# Patient Record
Sex: FEMALE | Race: WHITE | NOT HISPANIC OR LATINO | Employment: UNEMPLOYED | URBAN - METROPOLITAN AREA
[De-identification: names, ages, dates, MRNs, and addresses within clinical notes are randomized per-mention and may not be internally consistent; named-entity substitution may affect disease eponyms.]

---

## 2017-01-18 DIAGNOSIS — M25.512 PAIN IN LEFT SHOULDER: ICD-10-CM

## 2017-03-27 ENCOUNTER — ALLSCRIPTS OFFICE VISIT (OUTPATIENT)
Dept: OTHER | Facility: OTHER | Age: 37
End: 2017-03-27

## 2017-05-26 DIAGNOSIS — M25.512 PAIN IN LEFT SHOULDER: ICD-10-CM

## 2017-08-09 ENCOUNTER — ALLSCRIPTS OFFICE VISIT (OUTPATIENT)
Dept: OTHER | Facility: OTHER | Age: 37
End: 2017-08-09

## 2017-11-15 ENCOUNTER — ALLSCRIPTS OFFICE VISIT (OUTPATIENT)
Dept: OTHER | Facility: OTHER | Age: 37
End: 2017-11-15

## 2017-11-15 DIAGNOSIS — J18.9 PNEUMONIA: ICD-10-CM

## 2017-11-15 DIAGNOSIS — M54.30 SCIATICA: ICD-10-CM

## 2017-11-15 DIAGNOSIS — M25.512 PAIN IN LEFT SHOULDER: ICD-10-CM

## 2017-11-16 NOTE — PROGRESS NOTES
Assessment    1  Shoulder pain, left (719 41) (M25 512)   2  Sciatica without lumbago, unspecified laterality (724 3) (M54 30)    Plan  Sciatica without lumbago, unspecified laterality, Shoulder pain, left    · Naproxen 500 MG Oral Tablet; 1 tab po bid pc prn   · *1 - [facility]  *  Status: Active  Requestedfor: [id number]  Care Summary provided  : Yes    Discussion/Summary    1  Left shoulder pain - suspect mild rotator cuff tendinitis  I recommend that we refer patient to physical therapy  Patient refuses prednisone so we will start on naproxen 500 milligrams b i d  p c  Recheck 2 weeks if not improved-earlier if worseleft sciatica - I explained to patient that I do not believe that this has anything to do with her shoulder or neck  Patient does have some slight tenderness palpation over the left SI joint and straight leg raise is mildly positive  There is no weakness or loss of function  At this point we will recommend physical therapy and naproxen as above  Ice, rest and avoidance of exacerbating positions in activities also advised  Recheck 2-3 weeks if not improved that showed [employer]  Patient to call for problems or concerns in the interim  The patient was counseled regarding instructions for management,-- risk factor reductions,-- prognosis,-- patient and family education,-- impressions,-- risks and benefits of treatment options,-- importance of compliance with treatment  Possible side effects of new medications were reviewed with the patient/guardian today  The treatment plan was reviewed with the patient/guardian  The patient/guardian understands and agrees with the treatment plan      Chief Complaint  pain on left side starting at neck down to foot x 1 week      History of Present Illness  HPI: as abovept with a long hx of L shoulder pain presents with worsening over the last few months   Pt has tried to exercise to make it better but it seems to be making it worse, (+) trapezius area tightness  Pain may julia cause her to get frontal HAs  Pt feels like her L arm and hand is weakerpt notes 1 week hx of L leg pain  No increased lower back pain  Pain radiates down the back of her leg to the outside of her L ankle/foot (S1 dermatome)  No change in bowel/bladder function  Review of Systems   Constitutional: as noted in HPI  Cardiovascular: no complaints of slow or fast heart rate, no chest pain, no palpitations, no leg claudication or lower extremity edema  Respiratory: no complaints of shortness of breath, no wheezing, no dyspnea on exertion, no orthopnea or PND  Musculoskeletal: as noted in HPI  Integumentary: no complaints of skin rash or lesion, no itching or dry skin, no skin wounds  Neurological: as noted in HPI  Active Problems  1  Acute dysfunction of left eustachian tube (381 81) (H69 82)   2  Acute upper respiratory infection (465 9) (J06 9)   3  Annual physical exam (V70 0) (Z00 00)   4  Anxiety disorder (300 00) (F41 9)   5  Carpal tunnel syndrome of right wrist (354 0) (G56 01)   6  Shoulder pain, left (719 41) (M25 512)   7  Sprain of radiocarpal joint or ligament (842 02) (S63 529A)   8  Tuberculosis screening (V74 1) (Z11 1)   9  Wrist pain, chronic, right (895 97,459 56) (M25 531,G89 29)    Past Medical History  1  History of Anxiety (300 00) (F41 9)   2  History of Laceration of liver, subsequent encounter (V58 89,864 00) (S36 113D)   3  History of Laceration of right kidney, subsequent encounter (V58 89,866 02) (R23 042V)  Active Problems And Past Medical History Reviewed: The active problems and past medical history were reviewed and updated today  Family History  Mother    1  Family history of Hypertension (V17 49)  Father    2  Family history of Healthy adult  Brother    3  Family history of Healthy adult  Paternal Grandmother    3  Family history of colon cancer (V16 0) (Z80 0)  Family History    5  Family history of Cancer   6   Family history of Hypertension (V17 49)    Social History   · Marital History - Currently    · Never A Smoker   · Never Drank Alcohol   · Occupation: Homemaker  The social history was reviewed and updated today  Surgical History    1  History of Appendectomy   2  History of Complex Suture Of Laceration Of Liver   3  History of Diagnostic Esophagogastroduodenoscopy   4  History of Lysis Of Peritoneal Adhesions Laparoscopic    Current Meds   1  Daily Multivitamin TABS; Take 1 tablet daily Recorded   2  Escitalopram Oxalate 10 MG Oral Tablet; Take 1 tablet daily  Requested for: 98FEV6503; Last Rx:75Ykf7723 Ordered   3  Fluticasone Propionate 50 MCG/ACT Nasal Suspension; USE 2 SPRAYS IN EACH NOSTRIL ONCE DAILY; Therapy: 49TOJ0868 to (Last Rx:16Dja4736)  Requested for: 96Von5424 Ordered   4  Loratadine 10 MG Oral Tablet; TAKE 1 TABLET DAILY AS NEEDED; Therapy: 57QWK4263 to (Evaluate:68Mvs1757)  Requested for: 53Rvg6045; Last Rx:92Fvw6949 Ordered    The medication list was reviewed and updated today  Allergies  1  Inapsine SOLN  2  Latex    Vitals   Recorded: [id number] 01:35PM   Temperature 98 F   Heart Rate 74   Systolic 619   Diastolic 72   Height 5 ft 1 5 in   Weight 198 lb 2 oz   BMI Calculated 36 83   BSA Calculated 1 89       Physical Exam   Constitutional  General appearance: No acute distress, well appearing and well nourished  Neck  Neck: Supple, symmetric, trachea midline, no masses  Pulmonary  Respiratory effort: No increased work of breathing or signs of respiratory distress  Auscultation of lungs: Clear to auscultation  Cardiovascular  Auscultation of heart: Normal rate and rhythm, normal S1 and S2, no murmurs  Peripheral vascular exam: Normal    Examination of extremities for edema and/or varicosities: Normal    Lymphatic  Palpation of lymph nodes in neck: No lymphadenopathy  Palpation of lymph nodes in other areas: No lymphadenopathy     Musculoskeletal  Gait and station: Normal  Digits and nails: Normal without clubbing or cyanosis  Joints, bones, and muscles: Abnormal  -- (Left shoulder with good range of motion  Mild tenderness to palpation at the left subacromial space  Mild increased discomfort with empty can test  AC joint nontender  Mild discomfort with internal rotation with the arm abducted 90 degrees  Biceps tendons are within normal limits  Left lower back with slight tenderness to palpation along in the mid SI joint  No spasm appreciated  Straight leg raise is extremely limited by hamstring stretch parentheses approximately 33 degrees) disease  Straight leg raise does increase leg discomfort slightly)  Muscle strength/tone: Normal    Skin  Skin and subcutaneous tissue: Normal without rashes or lesions  Neurologic  Reflexes: 2+ and symmetric  Sensation: No sensory loss         Signatures   Electronically signed by : ENRICO Sanabria ; Nov 15 2017  1:57PM EST                       (Author)

## 2017-11-27 ENCOUNTER — GENERIC CONVERSION - ENCOUNTER (OUTPATIENT)
Dept: OTHER | Facility: OTHER | Age: 37
End: 2017-11-27

## 2017-11-27 ENCOUNTER — APPOINTMENT (OUTPATIENT)
Dept: PHYSICAL THERAPY | Facility: CLINIC | Age: 37
End: 2017-11-27
Payer: COMMERCIAL

## 2017-11-27 DIAGNOSIS — M54.30 SCIATICA: ICD-10-CM

## 2017-11-27 DIAGNOSIS — M25.512 PAIN IN LEFT SHOULDER: ICD-10-CM

## 2017-11-27 PROCEDURE — G8991 OTHER PT/OT GOAL STATUS: HCPCS

## 2017-11-27 PROCEDURE — G8990 OTHER PT/OT CURRENT STATUS: HCPCS

## 2017-11-27 PROCEDURE — 97162 PT EVAL MOD COMPLEX 30 MIN: CPT

## 2017-11-27 PROCEDURE — 97110 THERAPEUTIC EXERCISES: CPT

## 2017-12-01 ENCOUNTER — APPOINTMENT (OUTPATIENT)
Dept: PHYSICAL THERAPY | Facility: CLINIC | Age: 37
End: 2017-12-01
Payer: COMMERCIAL

## 2017-12-01 PROCEDURE — 97110 THERAPEUTIC EXERCISES: CPT

## 2017-12-01 PROCEDURE — 97140 MANUAL THERAPY 1/> REGIONS: CPT

## 2017-12-01 PROCEDURE — 97112 NEUROMUSCULAR REEDUCATION: CPT

## 2017-12-05 ENCOUNTER — APPOINTMENT (OUTPATIENT)
Dept: PHYSICAL THERAPY | Facility: CLINIC | Age: 37
End: 2017-12-05
Payer: COMMERCIAL

## 2017-12-05 PROCEDURE — 97140 MANUAL THERAPY 1/> REGIONS: CPT

## 2017-12-05 PROCEDURE — 97112 NEUROMUSCULAR REEDUCATION: CPT

## 2017-12-05 PROCEDURE — 97110 THERAPEUTIC EXERCISES: CPT

## 2017-12-07 ENCOUNTER — APPOINTMENT (OUTPATIENT)
Dept: PHYSICAL THERAPY | Facility: CLINIC | Age: 37
End: 2017-12-07
Payer: COMMERCIAL

## 2017-12-07 PROCEDURE — 97140 MANUAL THERAPY 1/> REGIONS: CPT

## 2017-12-07 PROCEDURE — 97112 NEUROMUSCULAR REEDUCATION: CPT

## 2017-12-07 PROCEDURE — 97530 THERAPEUTIC ACTIVITIES: CPT

## 2017-12-12 ENCOUNTER — APPOINTMENT (OUTPATIENT)
Dept: PHYSICAL THERAPY | Facility: CLINIC | Age: 37
End: 2017-12-12
Payer: COMMERCIAL

## 2017-12-13 ENCOUNTER — GENERIC CONVERSION - ENCOUNTER (OUTPATIENT)
Dept: OTHER | Facility: OTHER | Age: 37
End: 2017-12-13

## 2017-12-13 ENCOUNTER — TRANSCRIBE ORDERS (OUTPATIENT)
Dept: ADMINISTRATIVE | Facility: HOSPITAL | Age: 37
End: 2017-12-13

## 2017-12-13 ENCOUNTER — HOSPITAL ENCOUNTER (OUTPATIENT)
Dept: RADIOLOGY | Facility: HOSPITAL | Age: 37
Discharge: HOME | End: 2017-12-13
Payer: COMMERCIAL

## 2017-12-13 DIAGNOSIS — J18.9 PNEUMONIA: ICD-10-CM

## 2017-12-13 PROCEDURE — 71020 HB CHEST X-RAY 2VW FRONTAL&LATL: CPT

## 2017-12-14 ENCOUNTER — GENERIC CONVERSION - ENCOUNTER (OUTPATIENT)
Dept: OTHER | Facility: OTHER | Age: 37
End: 2017-12-14

## 2017-12-15 ENCOUNTER — APPOINTMENT (OUTPATIENT)
Dept: PHYSICAL THERAPY | Facility: CLINIC | Age: 37
End: 2017-12-15
Payer: COMMERCIAL

## 2017-12-19 ENCOUNTER — APPOINTMENT (OUTPATIENT)
Dept: PHYSICAL THERAPY | Facility: CLINIC | Age: 37
End: 2017-12-19
Payer: COMMERCIAL

## 2017-12-19 PROCEDURE — 97140 MANUAL THERAPY 1/> REGIONS: CPT

## 2017-12-21 ENCOUNTER — APPOINTMENT (OUTPATIENT)
Dept: PHYSICAL THERAPY | Facility: CLINIC | Age: 37
End: 2017-12-21
Payer: COMMERCIAL

## 2017-12-21 PROCEDURE — 97140 MANUAL THERAPY 1/> REGIONS: CPT

## 2018-01-02 ENCOUNTER — APPOINTMENT (OUTPATIENT)
Dept: PHYSICAL THERAPY | Facility: CLINIC | Age: 38
End: 2018-01-02
Payer: COMMERCIAL

## 2018-01-02 PROCEDURE — 97110 THERAPEUTIC EXERCISES: CPT

## 2018-01-02 PROCEDURE — 97140 MANUAL THERAPY 1/> REGIONS: CPT

## 2018-01-05 ENCOUNTER — APPOINTMENT (OUTPATIENT)
Dept: PHYSICAL THERAPY | Facility: CLINIC | Age: 38
End: 2018-01-05
Payer: COMMERCIAL

## 2018-01-05 PROCEDURE — G8990 OTHER PT/OT CURRENT STATUS: HCPCS

## 2018-01-05 PROCEDURE — G8991 OTHER PT/OT GOAL STATUS: HCPCS

## 2018-01-05 PROCEDURE — 97140 MANUAL THERAPY 1/> REGIONS: CPT

## 2018-01-05 PROCEDURE — 97110 THERAPEUTIC EXERCISES: CPT

## 2018-01-05 PROCEDURE — 97164 PT RE-EVAL EST PLAN CARE: CPT

## 2018-01-08 ENCOUNTER — APPOINTMENT (OUTPATIENT)
Dept: PHYSICAL THERAPY | Facility: CLINIC | Age: 38
End: 2018-01-08
Payer: COMMERCIAL

## 2018-01-08 PROCEDURE — 97110 THERAPEUTIC EXERCISES: CPT

## 2018-01-08 PROCEDURE — 97140 MANUAL THERAPY 1/> REGIONS: CPT

## 2018-01-10 ENCOUNTER — APPOINTMENT (OUTPATIENT)
Dept: PHYSICAL THERAPY | Facility: CLINIC | Age: 38
End: 2018-01-10
Payer: COMMERCIAL

## 2018-01-10 PROCEDURE — 97140 MANUAL THERAPY 1/> REGIONS: CPT

## 2018-01-10 PROCEDURE — 97010 HOT OR COLD PACKS THERAPY: CPT

## 2018-01-10 PROCEDURE — 97110 THERAPEUTIC EXERCISES: CPT

## 2018-01-10 NOTE — PROCEDURES
Procedures signed  by Janina Polo DO at 3/24/2016 11:14 AM       Author:  Janina Polo DO Service:  (none) Author Type:  Physician     Filed:  3/24/2016 11:14 AM Date of Service:  3/23/2016 12:04 PM Status:  Signed     :  Janina Polo DO (Physician)            [place], [place]  [id number]  NEUROLOGY REPORT  03/23/2016    Referring Physician  Dr Bina Wu     History Of Present Illness  38year-old, right-hand dominant female with chronic pain in the right   wrist  She also has occasional numbness in the digits 3 and 4 of the   right hand  She has a history of remote fractures as a child in both   the right ulna and right distal radius  These were treated   nonoperatively  She has a history of ligament tear and had been   treated by Dr Rema Arellano in the past  She had a previous   electrodiagnostic study at [facility] and was diagnosed with   cervical radiculopathy  She presents today for electrodiagnostic study   of the right upper extremity  Past Medical/surgical History  Negative  Physical Examination  Reflex motor and sensory exam are entirely intact in the right upper   extremity  On inspection, there is no gross osseous or soft tissue   deformity  Tinel sign is positive over the distal median nerve  There   is no fasciculations or tremors  Electrodiagnostic Findings   Electroneurography: The right median sensory latency from the wrist to   the index finger was top normal at 3 4 ms  The right median motor   distal latency was top normal at 4 0 ms but was a full millisecond   longer than the distal right ulnar motor latency  With stimulation at   the palm, there was an increase in amplitude indicating partial   conduction block of the motor fibers         Studies of the right median sensory fibers and radial sensory fibers   as measured to the thumb were entirely normal  Studies of the right   ulnar nerve including motor and sensory fibers were entirely normal  Electromyography: Monopolar needle exploration failed to reveal any   abnormal spontaneous potentials in the following right-sided muscles:   Cervical paraspinals, abductor pollicis brevis, pronator teres,   biceps, triceps, and deltoid  Motor units were normal in all muscles   as was recruitment  Impression  1  There is borderline median nerve slowing at the level of the right   wrist, which supports a clinical diagnosis of a borderline carpal   tunnel syndrome  2  There is no electrical evidence in the nerves and muscles tested   today to indicate or suggest the presence of any more proximal   pathology such as cervical radiculopathy or plexopathy, nor is there   evidence for any underlying polyneuropathy  Recommendation  Careful clinical correlation is advised    [id number]  D:  03/23/2016 10:26:00  Dictated by:  Leonarda Robert DO, [address] [facility] Board of Electrodiagnostic Medicine    T:  03/23/2016 12:04:20    CC:    Chanelle Del Valle MD             Received for:Vlad Robert DO  Mar 24 2016 11:15AM [facility] Standard Time

## 2018-01-12 VITALS
BODY MASS INDEX: 36.46 KG/M2 | TEMPERATURE: 98 F | DIASTOLIC BLOOD PRESSURE: 72 MMHG | HEART RATE: 74 BPM | HEIGHT: 62 IN | WEIGHT: 198.13 LBS | SYSTOLIC BLOOD PRESSURE: 112 MMHG

## 2018-01-13 VITALS
TEMPERATURE: 99.1 F | HEART RATE: 80 BPM | DIASTOLIC BLOOD PRESSURE: 72 MMHG | HEIGHT: 62 IN | BODY MASS INDEX: 35.33 KG/M2 | SYSTOLIC BLOOD PRESSURE: 106 MMHG | WEIGHT: 192 LBS

## 2018-01-13 NOTE — PROGRESS NOTES
Assessment    1  Annual physical exam (V70 0) (Z00 00)   2  Tuberculosis screening (V74 1) (Z11 1)    Plan  Tuberculosis screening    · PPD; INJECT 0 1  ML Intradermal; To Be Done: 32DXR6209    Discussion/Summary  health maintenance visit Currently, she eats an adequate diet and has an inadequate exercise regimen  the risks and benefits of cervical cancer screening were discussed Breast cancer screening: breast cancer screening is not indicated  Colorectal cancer screening: colorectal cancer screening is not indicated  The risks and benefits of immunizations were discussed and PPD placed  Advice and education were given regarding nutrition and weight bearing exercise  #1 PE  #2 HM - paper work for work done  i reviewed HM issues with pt  PPD placed - recheck 48h  f/u as above  or in 1 year  Pt to call for problems or concerns in the interim  The patient was counseled regarding instructions for management, risk factor reductions, patient and family education, impressions, risks and benefits of treatment options  Chief Complaint  WORK PE      History of Present Illness  HM, Adult Female: The patient is being seen for a health maintenance evaluation  General Health: The patient's health since the last visit is described as good  She has regular dental visits  She denies vision problems  She denies hearing loss  Immunizations status: up to date  Lifestyle:  She consumes a diverse and healthy diet  She does not have any weight concerns  She does not use tobacco  She denies alcohol use  She denies drug use  Reproductive health:  she reports normal menses  Screening: cancer screening reviewed and current  metabolic screening reviewed and current  risk screening reviewed and current  HPI: as above  - pt here for annual and work PE  - no new complaints  - I reviewed PMHx, PSHx, FamHx and Soc Hx with pt   Full ROS done      Review of Systems    Constitutional: No fever, no chills, feels well, no tiredness, no recent weight gain or weight loss  Eyes: No complaints of eye pain, no red eyes, no eyesight problems, no discharge, no dry eyes, no itching of eyes  ENT: no complaints of earache, no loss of hearing, no nose bleeds, no nasal discharge, no sore throat, no hoarseness  Cardiovascular: No complaints of slow heart rate, no fast heart rate, no chest pain, no palpitations, no leg claudication, no lower extremity edema  Respiratory: No complaints of shortness of breath, no wheezing, no cough, no SOB on exertion, no orthopnea, no PND  Gastrointestinal: No complaints of abdominal pain, no constipation, no nausea or vomiting, no diarrhea, no bloody stools  Genitourinary: No complaints of dysuria, no incontinence, no pelvic pain, no dysmenorrhea, no vaginal discharge or bleeding  Musculoskeletal: No complaints of arthralgias, no myalgias, no joint swelling or stiffness, no limb pain or swelling  Integumentary: No complaints of skin rash or lesions, no itching, no skin wounds, no breast pain or lump  Neurological: No complaints of headache, no confusion, no convulsions, no numbness, no dizziness or fainting, no tingling, no limb weakness, no difficulty walking  Psychiatric: Not suicidal, no sleep disturbance, no anxiety or depression, no change in personality, no emotional problems  Endocrine: No complaints of proptosis, no hot flashes, no muscle weakness, no deepening of the voice, no feelings of weakness  Hematologic/Lymphatic: No complaints of swollen glands, no swollen glands in the neck, does not bleed easily, does not bruise easily  Active Problems    1  Acute upper respiratory infection (465 9) (J06 9)   2  Anxiety disorder (300 00) (F41 9)   3  Carpal tunnel syndrome of right wrist (354 0) (G56 01)   4  Shoulder pain, left (719 41) (M21 512)   5  Sprain of radiocarpal joint or ligament (842 02) (S66 809B)   6   Wrist pain, chronic, right (641 62,914 14) (M25 531,G89 29)    Past Medical History    · History of Anxiety (300 00) (F41 9)   · History of Laceration of liver, subsequent encounter (V58 89,864 00) (S36 113D)   · History of Laceration of right kidney, subsequent encounter (V58 89,866 02) (S37 031D)    Surgical History    · History of Appendectomy   · History of Complex Suture Of Laceration Of Liver   · History of Diagnostic Esophagogastroduodenoscopy   · History of Lysis Of Peritoneal Adhesions Laparoscopic    Family History  Mother    · Family history of Hypertension (V17 49)  Father    · Family history of Healthy adult  Brother    · Family history of Healthy adult  Paternal Grandmother    · Family history of colon cancer (V16 0) (Z80 0)  Family History    · Family history of Cancer   · Family history of Hypertension (V17 49)    Social History    · Marital History - Currently    · Never A Smoker   · Never Drank Alcohol   · Occupation: Homemaker    Current Meds   1  Daily Multivitamin TABS; Take 1 tablet daily Recorded   2  Escitalopram Oxalate 10 MG Oral Tablet; Take 1 tablet daily  Requested for: 07PFI6404;   Last Rx:32Vgr0260 Ordered    Allergies    1  Inapsine SOLN    2  Latex    Vitals   Recorded: 11RGY8349 03:16PM   Temperature 98 9 F   Heart Rate 72   Systolic 564   Diastolic 70   Height 5 ft 1 5 in   Weight 180 lb    BMI Calculated 33 46   BSA Calculated 1 82     Physical Exam    Constitutional   General appearance: No acute distress, well appearing and well nourished  Head and Face   Head and face: Normal     Eyes   Conjunctiva and lids: No swelling, erythema or discharge  Pupils and irises: Equal, round, reactive to light  Ophthalmoscopic examination: Normal fundi and optic discs  Ears, Nose, Mouth, and Throat   External inspection of ears and nose: Normal     Otoscopic examination: Tympanic membranes translucent with normal light reflex  Canals patent without erythema  Hearing: Normal   grossly intact     Nasal mucosa, septum, and turbinates: Normal without edema or erythema  Lips, teeth, and gums: Normal, good dentition  Oropharynx: Normal with no erythema, edema, exudate or lesions  Neck   Neck: Supple, symmetric, trachea midline, no masses  Thyroid: Normal, no thyromegaly  Pulmonary   Respiratory effort: No increased work of breathing or signs of respiratory distress  Auscultation of lungs: Clear to auscultation  Cardiovascular   Auscultation of heart: Normal rate and rhythm, normal S1 and S2, no murmurs  Carotid pulses: 2+ bilaterally  Abdominal aorta: Normal     Pedal pulses: 2+ bilaterally  Peripheral vascular exam: Normal     Examination of extremities for edema and/or varicosities: Normal     Chest   Chest: Normal     Abdomen   Abdomen: Non-tender, no masses  Liver and spleen: No hepatomegaly or splenomegaly  Lymphatic   Palpation of lymph nodes in neck: No lymphadenopathy  Palpation of lymph nodes in other areas: No lymphadenopathy  Musculoskeletal   Gait and station: Normal     Digits and nails: Normal without clubbing or cyanosis  Joints, bones, and muscles: Normal     Range of motion: Normal     Muscle strength/tone: Normal     Skin   Skin and subcutaneous tissue: Normal without rashes or lesions  Neurologic   Cranial nerves: Cranial nerves II-XII intact  Cortical function: Normal mental status  Reflexes: 2+ and symmetric  Sensation: No sensory loss  Coordination: Normal finger to nose and heel to shin  Psychiatric   Judgment and insight: Normal     Orientation to person, place, and time: Normal     Recent and remote memory: Intact  Mood and affect: Normal        Results/Data  PHQ-2 Adult Depression Screening 27Mar2017 03:18PM User, Felipa     Test Name Result Flag Reference   PHQ-2 Adult Depression Score 0     Over the last two weeks, how often have you been bothered by any of the following problems?   Little interest or pleasure in doing things: Not at all - 0  Feeling down, depressed, or hopeless: Not at all - 0   PHQ-2 Adult Depression Screening Negative         Procedure    Procedure: Visual Acuity Test    Indication: routine screening  Inforrmation supplied by ALBERTO BROWN a Snellen chart  Results: 20/20 in the right eye without corrective device, 20/20 in the left eye with corrective device   The patient was cooperative, but tolerated the procedure well  There were no complications        Future Appointments    Date/Time Provider Specialty Site   03/29/2017 03:00 PM Sony Harmon, Nurse Schedule  [facility]     Signatures   Electronically signed by : Cherylene Spear, M D ; Mar 28 2017 12:45PM EST                       (Author)

## 2018-01-14 VITALS
SYSTOLIC BLOOD PRESSURE: 108 MMHG | DIASTOLIC BLOOD PRESSURE: 70 MMHG | HEART RATE: 72 BPM | TEMPERATURE: 98.9 F | HEIGHT: 62 IN | WEIGHT: 180 LBS | BODY MASS INDEX: 33.13 KG/M2

## 2018-01-16 ENCOUNTER — APPOINTMENT (OUTPATIENT)
Dept: PHYSICAL THERAPY | Facility: CLINIC | Age: 38
End: 2018-01-16
Payer: COMMERCIAL

## 2018-01-16 PROCEDURE — 97140 MANUAL THERAPY 1/> REGIONS: CPT

## 2018-01-16 PROCEDURE — 97110 THERAPEUTIC EXERCISES: CPT

## 2018-01-19 ENCOUNTER — APPOINTMENT (OUTPATIENT)
Dept: PHYSICAL THERAPY | Facility: CLINIC | Age: 38
End: 2018-01-19
Payer: COMMERCIAL

## 2018-01-19 PROCEDURE — 97110 THERAPEUTIC EXERCISES: CPT

## 2018-01-19 PROCEDURE — 97140 MANUAL THERAPY 1/> REGIONS: CPT

## 2018-01-23 ENCOUNTER — APPOINTMENT (OUTPATIENT)
Dept: PHYSICAL THERAPY | Facility: CLINIC | Age: 38
End: 2018-01-23
Payer: COMMERCIAL

## 2018-01-23 PROCEDURE — 97110 THERAPEUTIC EXERCISES: CPT

## 2018-01-23 PROCEDURE — 97140 MANUAL THERAPY 1/> REGIONS: CPT

## 2018-01-23 NOTE — RESULT NOTES
Verified Results  * XR CHEST PA & LATERAL [id number] 05:54PM Dionne Sousa Order Number: [id number]     Test Name Result Flag Reference   XR CHEST PA & LATERAL (Report)     This is a summary report  The complete report is available in the patient's medical record  If you cannot access the medical record, please contact the sending organization for a detailed fax or copy  CHEST      INDICATION: J18 9: Pneumonia, unspecified organism  History taken directly from the electronic ordering system  Productive cough and shortness of breath  Chest pain  COMPARISON: None     VIEWS: Frontal and lateral projections     IMAGES: 2     FINDINGS:        Cardiomediastinal silhouette appears unremarkable  There is a small focus of airspace disease in either the left upper lobe or superior segment of the left lower lobe  Right lung is clear  There is no pneumothorax or pleural effusion  Visualized osseous structures appear within normal limits for the patient's age  IMPRESSION:     Small focal airspace opacity in the left lung most concerning for pneumonia  Follow-up radiographs are recommended post therapy to assure resolution (6-10 weeks time)      The presence of a significant finding was documented as such in Epic for liason and referring practitioner notification         Workstation performed: [id number]     Signed by:   Steve Lima MD   12/14/17

## 2018-01-24 VITALS
SYSTOLIC BLOOD PRESSURE: 108 MMHG | WEIGHT: 195.38 LBS | HEIGHT: 62 IN | DIASTOLIC BLOOD PRESSURE: 78 MMHG | TEMPERATURE: 99.4 F | HEART RATE: 70 BPM | BODY MASS INDEX: 35.95 KG/M2

## 2018-01-25 ENCOUNTER — OFFICE VISIT (OUTPATIENT)
Dept: FAMILY MEDICINE CLINIC | Facility: CLINIC | Age: 38
End: 2018-01-25
Payer: COMMERCIAL

## 2018-01-25 ENCOUNTER — APPOINTMENT (OUTPATIENT)
Dept: PHYSICAL THERAPY | Facility: CLINIC | Age: 38
End: 2018-01-25
Payer: COMMERCIAL

## 2018-01-25 ENCOUNTER — TELEPHONE (OUTPATIENT)
Dept: FAMILY MEDICINE CLINIC | Facility: CLINIC | Age: 38
End: 2018-01-25

## 2018-01-25 VITALS
DIASTOLIC BLOOD PRESSURE: 84 MMHG | WEIGHT: 199.4 LBS | HEIGHT: 63 IN | BODY MASS INDEX: 35.33 KG/M2 | TEMPERATURE: 99.5 F | HEART RATE: 76 BPM | SYSTOLIC BLOOD PRESSURE: 118 MMHG

## 2018-01-25 DIAGNOSIS — J18.9 PNEUMONIA: ICD-10-CM

## 2018-01-25 DIAGNOSIS — J01.10 ACUTE NON-RECURRENT FRONTAL SINUSITIS: Primary | ICD-10-CM

## 2018-01-25 PROCEDURE — 99213 OFFICE O/P EST LOW 20 MIN: CPT | Performed by: FAMILY MEDICINE

## 2018-01-25 RX ORDER — AMOXICILLIN AND CLAVULANATE POTASSIUM 875; 125 MG/1; MG/1
1 TABLET, FILM COATED ORAL EVERY 12 HOURS SCHEDULED
Qty: 20 TABLET | Refills: 0 | Status: SHIPPED | OUTPATIENT
Start: 2018-01-25 | End: 2018-02-04

## 2018-01-25 RX ORDER — ESCITALOPRAM OXALATE 10 MG/1
1 TABLET ORAL DAILY
COMMUNITY
End: 2018-12-17 | Stop reason: SDUPTHER

## 2018-01-25 RX ORDER — ERGOCALCIFEROL (VITAMIN D2) 10 MCG
1 TABLET ORAL DAILY
COMMUNITY

## 2018-01-25 NOTE — PROGRESS NOTES
Assessment/Plan:      Pt to f/u 4-5 days if not improving, earlier if worsening  Pt to call for problems or concerns in the interim    Subjective:      Patient ID: Rangel Singh is a 44 y o  female  43 yo female present with mild cough and congestion x 3-4 days no with 24h hx of nausea, ear pain, sinus pressure and fever  Cough is occasionally productive  ASHBY was in the fronta/temple area      Earache    Associated symptoms include coughing, headaches, rhinorrhea and a sore throat  Pertinent negatives include no hearing loss  Sore Throat    Associated symptoms include coughing, ear pain and headaches  Cough   Associated symptoms include chills, ear pain, a fever, headaches, postnasal drip, rhinorrhea and a sore throat  Pertinent negatives include no chest pain or eye redness  Headache    Associated symptoms include coughing, ear pain, a fever, nausea, rhinorrhea and a sore throat  Pertinent negatives include no eye pain, eye redness or hearing loss  Nausea   Associated symptoms include chills, coughing, fatigue, a fever, headaches, nausea and a sore throat  Pertinent negatives include no chest pain  The following portions of the patient's history were reviewed and updated as appropriate: allergies, current medications, past medical history, past social history and problem list     Review of Systems   Constitutional: Positive for chills, fatigue and fever  Negative for activity change and appetite change  HENT: Positive for ear pain, postnasal drip, rhinorrhea, sinus pain and sore throat  Negative for facial swelling and hearing loss  Eyes: Negative for pain, discharge, redness and itching  Respiratory: Positive for cough  Cardiovascular: Negative for chest pain  Gastrointestinal: Positive for nausea  Neurological: Positive for headaches  Objective:     Physical Exam   Constitutional: She appears well-developed and well-nourished  No distress     HENT:   Head: Normocephalic and atraumatic  Right Ear: Hearing, tympanic membrane, external ear and ear canal normal    Left Ear: Hearing, tympanic membrane, external ear and ear canal normal    Nose: Mucosal edema present  No rhinorrhea  Right sinus exhibits frontal sinus tenderness  Right sinus exhibits no maxillary sinus tenderness  Left sinus exhibits frontal sinus tenderness  Left sinus exhibits no maxillary sinus tenderness  Mouth/Throat: Uvula is midline, oropharynx is clear and moist and mucous membranes are normal    Eyes: Conjunctivae and EOM are normal  Pupils are equal, round, and reactive to light  Neck: Normal range of motion  No thyromegaly present  Cardiovascular: Normal rate and intact distal pulses  Pulmonary/Chest: Effort normal and breath sounds normal    Skin: Skin is warm and dry  She is not diaphoretic

## 2018-01-30 ENCOUNTER — OFFICE VISIT (OUTPATIENT)
Dept: PHYSICAL THERAPY | Facility: CLINIC | Age: 38
End: 2018-01-30
Payer: COMMERCIAL

## 2018-01-30 DIAGNOSIS — M25.512 LEFT SHOULDER PAIN, UNSPECIFIED CHRONICITY: Primary | ICD-10-CM

## 2018-01-30 DIAGNOSIS — M54.31 SCIATICA OF RIGHT SIDE: ICD-10-CM

## 2018-01-30 PROCEDURE — 97110 THERAPEUTIC EXERCISES: CPT

## 2018-01-30 PROCEDURE — 97140 MANUAL THERAPY 1/> REGIONS: CPT

## 2018-01-30 NOTE — PROGRESS NOTES
Daily Note     Today's date: 2018  Patient name: Mariza Blair  : 1980  MRN: [id number]  Referring provider: Alexander Casarez  Dx:   Encounter Diagnoses   Name Primary?  Left shoulder pain, unspecified chronicity Yes    Sciatica of right side                   Subjective: Patient is without complaints at start of session, denies paresthesias in arm and increased soreness following last visit  States she bought a thera cane and has been using it on L UT consistently with significant relief of symptoms  Objective: See treatment diary below  Precautions: none    Daily Treatment Diary     Manual              Cervical traction x            Cervical PROM x            L UT stretch and release x                                          Exercise Diary              UBE warm up Retro 5'            UT and LS stretch 10"x5 each            Chin tucks 5"x5            latt stretch elbows on table :20x5            pec stretch on foam roller (3 positions) :20x4 each            Seat t/s extensions 10"x5            Door pec stretch 20"x5            Raysal:              rows 20# 3x10             extensions 15# 3x10             scap  depressions 13# 3x10                                                                                                                                     Modalities              MH to L UT/LS NP                                        No significant tenderness in UT noted with manuals today, mild tightness present initially that resolved following release  Patient requires intermittent verbal cues to avoid over activation of UT with tal exercises  No increased symptoms noted with TE  Patient states she feels good at end of session  Assessment: Tolerated treatment well  Plan: Progress treatment as tolerated

## 2018-02-02 ENCOUNTER — APPOINTMENT (OUTPATIENT)
Dept: PHYSICAL THERAPY | Facility: CLINIC | Age: 38
End: 2018-02-02
Payer: COMMERCIAL

## 2018-02-07 ENCOUNTER — APPOINTMENT (OUTPATIENT)
Dept: PHYSICAL THERAPY | Facility: CLINIC | Age: 38
End: 2018-02-07
Payer: COMMERCIAL

## 2018-02-21 ENCOUNTER — TELEPHONE (OUTPATIENT)
Dept: FAMILY MEDICINE CLINIC | Facility: CLINIC | Age: 38
End: 2018-02-21

## 2018-02-21 ENCOUNTER — EVALUATION (OUTPATIENT)
Dept: PHYSICAL THERAPY | Facility: CLINIC | Age: 38
End: 2018-02-21
Payer: COMMERCIAL

## 2018-02-21 DIAGNOSIS — M54.31 SCIATICA OF RIGHT SIDE: ICD-10-CM

## 2018-02-21 DIAGNOSIS — M25.512 LEFT SHOULDER PAIN, UNSPECIFIED CHRONICITY: Primary | ICD-10-CM

## 2018-02-21 PROCEDURE — 97530 THERAPEUTIC ACTIVITIES: CPT | Performed by: PHYSICAL THERAPIST

## 2018-02-21 PROCEDURE — G8991 OTHER PT/OT GOAL STATUS: HCPCS | Performed by: PHYSICAL THERAPIST

## 2018-02-21 PROCEDURE — G8992 OTHER PT/OT  D/C STATUS: HCPCS | Performed by: PHYSICAL THERAPIST

## 2018-02-21 PROCEDURE — 97110 THERAPEUTIC EXERCISES: CPT | Performed by: PHYSICAL THERAPIST

## 2018-02-21 NOTE — PROGRESS NOTES
PT Re-Evaluation     Today's date: 2018  Patient name: Vasile Arrington  : 1980  MRN: [id number]  Referring provider: Simone Guzman  Dx:   Encounter Diagnosis     ICD-10-CM    1  Left shoulder pain, unspecified chronicity M25 512    2  Sciatica of right side M54 31                   Assessment  Impairments: abnormal muscle firing, abnormal muscle tone and activity intolerance    Assessment details: Patient has made minimal progression since beginning physical therapy  Her pain has been inconsistent and she continues to have increased pain with activity  Patient demonstrates significant UE tightness L >R side , forward head and rounded shoulders, difficulty with scapulo thoracic dissociation likely leading to increased shoulder pain  Patient has plateaued with physical therapy treatment and would benefit from further assessment by physician at this time  Understanding of Dx/Px/POC: fair   Prognosis: fair    Goals  Follow up with physician  Plan  Planned therapy interventions: home exercise program  Plan details: Patient on hold at this time and was requested to follow up with her physician regarding alternative treatments for pain  Subjective Evaluation    History of Present Illness  Mechanism of injury: Patient notes her pain had continued to progress and improve  She has just been away on a long cruise and she has been having an increase in low back pain and neck pain  She has given orthotics a try for her back and it has been feeling a bit better  She finds significant relief with her theracane and continues to use it at home  She reports she still has constant pain in her shoulder and she is now getting headaches again and pain  She is able to move her arm more than she was able to before  Quality of life: good    Pain  Current pain ratin  At worst pain ratin  Quality: dull ache  Exacerbated by: overuse    Progression: no change    Treatments  Previous treatment: physical therapy  Patient Goals  Patient goals for therapy: decreased pain, increased strength, independence with ADLs/IADLs and return to sport/leisure activities  Patient goal: increase functional level and return to gym and recreational activiites pain free         Objective     Active Range of Motion   Cervical/Thoracic Spine   Normal active range of motion  Left Shoulder   Flexion: 157 degrees     Right Shoulder   Normal active range of motion    Lumbar   Flexion: 94 degrees   Extension: 27 degrees   Left lateral flexion: 32 degrees   Right lateral flexion: 37 degrees     Additional Active Range of Motion Details  Patient reports when she performs lateral flexion to R she gets discomfort into R shoulder blade     Patient notes tightness at end range           Precautions: None     Daily Treatment Diary     Manual  1/30                     Cervical traction x                     Cervical PROM x                     L UT stretch and release x                                                                           Exercise Diary  1/30 2/21                   UBE warm up Retro 5'  NP                   UT and LS stretch 10"x5 each  X                   Chin tucks 5"x5  X                   latt stretch elbows on table :20x5  NP                   pec stretch on foam roller (3 positions) :20x4 each  X                   Seat t/s extensions 10"x5  X                   Door pec stretch 20"x5  NP                   Everson:    NP                    rows 20# 3x10  NP                    extensions 15# 3x10 NP                    scap  depressions 13# 3x10  NP                    sustained neck flexion    15sx4                                                                                                                                                                                                                         Modalities  1/30                     MH to L UT/LS NP

## 2018-05-29 ENCOUNTER — TELEPHONE (OUTPATIENT)
Dept: FAMILY MEDICINE CLINIC | Facility: CLINIC | Age: 38
End: 2018-05-29

## 2018-05-29 NOTE — TELEPHONE ENCOUNTER
CALLED PT [facility] FOR PT TO RETURN CALL REGARDING CHEST XR DONE 12-13-17 FOR A F/U CXR TO MAKE SURE LUNGS FULLY HEALED

## 2018-06-11 DIAGNOSIS — J18.9 PNEUMONIA OF LEFT UPPER LOBE DUE TO INFECTIOUS ORGANISM: Primary | ICD-10-CM

## 2018-08-28 NOTE — TELEPHONE ENCOUNTER
If no cahnge - rec; ortho eval
Pt called and aware of result/information  - [facility] # given to pt
SHE HAD PT TODAY FOR HER SHOULDER  PT SAID SHES NOT SURE HOW MUCH MORE PT WILL HELP HER   SHE IS STILL HAVING PAIN     SHE WANTS TO KNOW WHAT THE NEXT STEP IS   PLEASE ADVISE
No

## 2018-08-30 ENCOUNTER — OFFICE VISIT (OUTPATIENT)
Dept: FAMILY MEDICINE CLINIC | Facility: CLINIC | Age: 38
End: 2018-08-30
Payer: COMMERCIAL

## 2018-08-30 VITALS
HEIGHT: 63 IN | SYSTOLIC BLOOD PRESSURE: 122 MMHG | DIASTOLIC BLOOD PRESSURE: 74 MMHG | TEMPERATURE: 98.8 F | WEIGHT: 202 LBS | HEART RATE: 84 BPM | BODY MASS INDEX: 35.79 KG/M2

## 2018-08-30 DIAGNOSIS — R10.84 DIFFUSE ABDOMINAL PAIN: Primary | ICD-10-CM

## 2018-08-30 DIAGNOSIS — R19.7 DIARRHEA, UNSPECIFIED TYPE: ICD-10-CM

## 2018-08-30 PROCEDURE — 3008F BODY MASS INDEX DOCD: CPT | Performed by: FAMILY MEDICINE

## 2018-08-30 PROCEDURE — 99214 OFFICE O/P EST MOD 30 MIN: CPT | Performed by: FAMILY MEDICINE

## 2018-08-30 NOTE — PROGRESS NOTES
Assessment/Plan:      Diagnoses and all orders for this visit:    Diffuse abdominal pain  -     Comprehensive metabolic panel; Future  -     CBC and differential; Future  -     Lipase; Future  -     Celiac Disease Antibody Profile; Future  -     Sedimentation rate, automated; Future  -     Ambulatory referral to General Surgery; Future    Diarrhea, unspecified type  -     Comprehensive metabolic panel; Future  -     CBC and differential; Future  -     Lipase; Future  -     Celiac Disease Antibody Profile; Future  -     Sedimentation rate, automated; Future     Discussion:   Patient with history of multiple abdominal surgeries and significant small-bowel obstructions secondary to adhesions  She is concerned that the adhesions are coming back  Exam showed mild diffuse tenderness without signs of acute abdomen  Patient denies distention or vomiting at present  I will send patient for labs and refer her to General surgery for further evaluation  Reviewed signs and symptoms of obstruction with patient-patient understands she should go to the emergency room if the symptoms should occur  Recheck after surgical evaluation  Patient call for problems or concerns in the interim      Subjective:     Patient ID: Yesenia Zaidi is a 43 y o  female  Pt with hx of SBO x 2 secondary to adhesions, s/p surgery x 2 (last in 2009) presents with return of abd pain  Pt had some abd cramping and diarrhea 18m ago that improved with change in diet  approx 8 weeks ago, pain returned - cramping in upper and lower stomach, intermittent diarrhea and increased GERD  Tried changing diet, walking/moving and OTC omeprazole without improvement  Presently gets discomfort "all the time"  Better with a hot bath, and occasionally eating (toast, crackers)  Nothing seems to make it worse  Pain is typically 3-5 at present  No N/V  No weight loss      GI Problem   The primary symptoms include abdominal pain and diarrhea   Primary symptoms do not include nausea or vomiting  The illness does not include constipation  Review of Systems   Constitutional: Negative  HENT: Negative  Eyes: Negative  Respiratory: Negative  Cardiovascular: Negative  Gastrointestinal: Positive for abdominal distention, abdominal pain and diarrhea  Negative for blood in stool, constipation, nausea and vomiting  Genitourinary: Negative  Musculoskeletal: Negative  Skin: Negative  Objective:     Physical Exam   Constitutional: She appears well-developed and well-nourished  HENT:   Head: Normocephalic and atraumatic  Right Ear: External ear normal    Left Ear: External ear normal    Nose: Nose normal    Mouth/Throat: Oropharynx is clear and moist  No oropharyngeal exudate  Eyes: Conjunctivae and EOM are normal  Pupils are equal, round, and reactive to light  Neck: Normal range of motion  Neck supple  No JVD present  No thyromegaly present  Cardiovascular: Normal rate, regular rhythm and intact distal pulses  No murmur heard  Pulmonary/Chest: Effort normal and breath sounds normal    Abdominal: Soft  Bowel sounds are normal  She exhibits no distension and no mass  There is tenderness (mild, diffuse without G/R  )  Musculoskeletal: Normal range of motion  Lymphadenopathy:     She has no cervical adenopathy  Skin: Skin is warm  She is not diaphoretic  Psychiatric: She has a normal mood and affect  Vitals reviewed

## 2018-09-07 ENCOUNTER — APPOINTMENT (OUTPATIENT)
Dept: LAB | Facility: CLINIC | Age: 38
End: 2018-09-07
Payer: COMMERCIAL

## 2018-09-07 DIAGNOSIS — R10.84 DIFFUSE ABDOMINAL PAIN: ICD-10-CM

## 2018-09-07 DIAGNOSIS — R19.7 DIARRHEA, UNSPECIFIED TYPE: ICD-10-CM

## 2018-09-07 LAB
ALBUMIN SERPL BCP-MCNC: 3.4 G/DL (ref 3.5–5)
ALP SERPL-CCNC: 55 U/L (ref 46–116)
ALT SERPL W P-5'-P-CCNC: 28 U/L (ref 12–78)
ANION GAP SERPL CALCULATED.3IONS-SCNC: 7 MMOL/L (ref 4–13)
AST SERPL W P-5'-P-CCNC: 12 U/L (ref 5–45)
BASOPHILS # BLD AUTO: 0.06 THOUSANDS/ΜL (ref 0–0.1)
BASOPHILS NFR BLD AUTO: 1 % (ref 0–1)
BILIRUB SERPL-MCNC: 0.5 MG/DL (ref 0.2–1)
BUN SERPL-MCNC: 9 MG/DL (ref 5–25)
CALCIUM SERPL-MCNC: 8.9 MG/DL (ref 8.3–10.1)
CHLORIDE SERPL-SCNC: 106 MMOL/L (ref 100–108)
CO2 SERPL-SCNC: 29 MMOL/L (ref 21–32)
CREAT SERPL-MCNC: 0.94 MG/DL (ref 0.6–1.3)
EOSINOPHIL # BLD AUTO: 0.05 THOUSAND/ΜL (ref 0–0.61)
EOSINOPHIL NFR BLD AUTO: 1 % (ref 0–6)
ERYTHROCYTE [DISTWIDTH] IN BLOOD BY AUTOMATED COUNT: 12.9 % (ref 11.6–15.1)
ERYTHROCYTE [SEDIMENTATION RATE] IN BLOOD: 15 MM/HOUR (ref 0–20)
GFR SERPL CREATININE-BSD FRML MDRD: 77 ML/MIN/1.73SQ M
GLUCOSE SERPL-MCNC: 97 MG/DL (ref 65–140)
HCT VFR BLD AUTO: 39.4 % (ref 34.8–46.1)
HGB BLD-MCNC: 12.7 G/DL (ref 11.5–15.4)
IMM GRANULOCYTES # BLD AUTO: 0.01 THOUSAND/UL (ref 0–0.2)
IMM GRANULOCYTES NFR BLD AUTO: 0 % (ref 0–2)
LIPASE SERPL-CCNC: 139 U/L (ref 73–393)
LYMPHOCYTES # BLD AUTO: 2.16 THOUSANDS/ΜL (ref 0.6–4.47)
LYMPHOCYTES NFR BLD AUTO: 32 % (ref 14–44)
MCH RBC QN AUTO: 28 PG (ref 26.8–34.3)
MCHC RBC AUTO-ENTMCNC: 32.2 G/DL (ref 31.4–37.4)
MCV RBC AUTO: 87 FL (ref 82–98)
MONOCYTES # BLD AUTO: 0.32 THOUSAND/ΜL (ref 0.17–1.22)
MONOCYTES NFR BLD AUTO: 5 % (ref 4–12)
NEUTROPHILS # BLD AUTO: 4.21 THOUSANDS/ΜL (ref 1.85–7.62)
NEUTS SEG NFR BLD AUTO: 61 % (ref 43–75)
NRBC BLD AUTO-RTO: 0 /100 WBCS
PLATELET # BLD AUTO: 317 THOUSANDS/UL (ref 149–390)
PMV BLD AUTO: 10.1 FL (ref 8.9–12.7)
POTASSIUM SERPL-SCNC: 4.3 MMOL/L (ref 3.5–5.3)
PROT SERPL-MCNC: 7 G/DL (ref 6.4–8.2)
RBC # BLD AUTO: 4.53 MILLION/UL (ref 3.81–5.12)
SODIUM SERPL-SCNC: 142 MMOL/L (ref 136–145)
WBC # BLD AUTO: 6.81 THOUSAND/UL (ref 4.31–10.16)

## 2018-09-07 PROCEDURE — 85652 RBC SED RATE AUTOMATED: CPT

## 2018-09-07 PROCEDURE — 85025 COMPLETE CBC W/AUTO DIFF WBC: CPT

## 2018-09-07 PROCEDURE — 36415 COLL VENOUS BLD VENIPUNCTURE: CPT

## 2018-09-07 PROCEDURE — 80053 COMPREHEN METABOLIC PANEL: CPT

## 2018-09-07 PROCEDURE — 82784 ASSAY IGA/IGD/IGG/IGM EACH: CPT

## 2018-09-07 PROCEDURE — 86255 FLUORESCENT ANTIBODY SCREEN: CPT

## 2018-09-07 PROCEDURE — 83516 IMMUNOASSAY NONANTIBODY: CPT

## 2018-09-07 PROCEDURE — 83690 ASSAY OF LIPASE: CPT

## 2018-09-08 LAB
ENDOMYSIUM IGA SER QL: NEGATIVE
GLIADIN PEPTIDE IGA SER-ACNC: 4 UNITS (ref 0–19)
GLIADIN PEPTIDE IGG SER-ACNC: 2 UNITS (ref 0–19)
IGA SERPL-MCNC: 172 MG/DL (ref 87–352)
TTG IGA SER-ACNC: <2 U/ML (ref 0–3)
TTG IGG SER-ACNC: <2 U/ML (ref 0–5)

## 2018-09-11 ENCOUNTER — TRANSCRIBE ORDERS (OUTPATIENT)
Dept: ADMINISTRATIVE | Facility: HOSPITAL | Age: 38
End: 2018-09-11

## 2018-09-11 DIAGNOSIS — R10.9 STOMACH ACHE: Primary | ICD-10-CM

## 2018-09-17 ENCOUNTER — HOSPITAL ENCOUNTER (OUTPATIENT)
Dept: RADIOLOGY | Facility: MEDICAL CENTER | Age: 38
Discharge: HOME | End: 2018-09-17
Payer: COMMERCIAL

## 2018-09-17 DIAGNOSIS — R10.9 STOMACH ACHE: ICD-10-CM

## 2018-09-17 PROCEDURE — 76705 ECHO EXAM OF ABDOMEN: CPT

## 2018-10-22 ENCOUNTER — TRANSCRIBE ORDERS (OUTPATIENT)
Dept: ADMINISTRATIVE | Facility: HOSPITAL | Age: 38
End: 2018-10-22

## 2018-10-22 DIAGNOSIS — R10.9 STOMACH ACHE: Primary | ICD-10-CM

## 2018-12-17 DIAGNOSIS — F41.9 ANXIETY: Primary | ICD-10-CM

## 2018-12-17 RX ORDER — ESCITALOPRAM OXALATE 10 MG/1
TABLET ORAL
Qty: 90 TABLET | Refills: 2 | Status: SHIPPED | OUTPATIENT
Start: 2018-12-17 | End: 2019-10-04 | Stop reason: SDUPTHER

## 2019-04-19 ENCOUNTER — OFFICE VISIT (OUTPATIENT)
Dept: FAMILY MEDICINE CLINIC | Facility: CLINIC | Age: 39
End: 2019-04-19
Payer: COMMERCIAL

## 2019-04-19 VITALS
TEMPERATURE: 98.4 F | WEIGHT: 204 LBS | DIASTOLIC BLOOD PRESSURE: 78 MMHG | HEIGHT: 63 IN | BODY MASS INDEX: 36.14 KG/M2 | HEART RATE: 80 BPM | SYSTOLIC BLOOD PRESSURE: 120 MMHG

## 2019-04-19 DIAGNOSIS — H10.31 ACUTE CONJUNCTIVITIS OF RIGHT EYE, UNSPECIFIED ACUTE CONJUNCTIVITIS TYPE: Primary | ICD-10-CM

## 2019-04-19 PROCEDURE — 99213 OFFICE O/P EST LOW 20 MIN: CPT | Performed by: FAMILY MEDICINE

## 2019-04-19 PROCEDURE — 3008F BODY MASS INDEX DOCD: CPT | Performed by: FAMILY MEDICINE

## 2019-04-19 PROCEDURE — 1036F TOBACCO NON-USER: CPT | Performed by: FAMILY MEDICINE

## 2019-04-19 RX ORDER — ESCITALOPRAM OXALATE 5 MG/1
10 TABLET ORAL
COMMUNITY
End: 2019-04-19 | Stop reason: ALTCHOICE

## 2019-04-19 RX ORDER — CIPROFLOXACIN HYDROCHLORIDE 3.5 MG/ML
1 SOLUTION/ DROPS TOPICAL 4 TIMES DAILY
Qty: 5 ML | Refills: 0 | Status: SHIPPED | OUTPATIENT
Start: 2019-04-19 | End: 2019-12-09

## 2019-04-22 ENCOUNTER — TELEPHONE (OUTPATIENT)
Dept: FAMILY MEDICINE CLINIC | Facility: CLINIC | Age: 39
End: 2019-04-22

## 2019-10-04 DIAGNOSIS — F41.9 ANXIETY: ICD-10-CM

## 2019-10-04 RX ORDER — ESCITALOPRAM OXALATE 10 MG/1
TABLET ORAL
Qty: 90 TABLET | Refills: 2 | Status: SHIPPED | OUTPATIENT
Start: 2019-10-04 | End: 2020-10-25

## 2019-12-09 ENCOUNTER — OFFICE VISIT (OUTPATIENT)
Dept: FAMILY MEDICINE CLINIC | Facility: CLINIC | Age: 39
End: 2019-12-09
Payer: COMMERCIAL

## 2019-12-09 VITALS
HEIGHT: 63 IN | HEART RATE: 76 BPM | BODY MASS INDEX: 37.14 KG/M2 | SYSTOLIC BLOOD PRESSURE: 126 MMHG | WEIGHT: 209.6 LBS | DIASTOLIC BLOOD PRESSURE: 82 MMHG | TEMPERATURE: 98.2 F

## 2019-12-09 DIAGNOSIS — Z11.1 TUBERCULOSIS SCREENING: ICD-10-CM

## 2019-12-09 DIAGNOSIS — Z12.31 SCREENING MAMMOGRAM, ENCOUNTER FOR: ICD-10-CM

## 2019-12-09 DIAGNOSIS — Z00.00 ANNUAL PHYSICAL EXAM: Primary | ICD-10-CM

## 2019-12-09 PROCEDURE — 86580 TB INTRADERMAL TEST: CPT | Performed by: NURSE PRACTITIONER

## 2019-12-09 PROCEDURE — 99396 PREV VISIT EST AGE 40-64: CPT | Performed by: NURSE PRACTITIONER

## 2019-12-09 NOTE — PATIENT INSTRUCTIONS
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
## 2019-12-11 ENCOUNTER — CLINICAL SUPPORT (OUTPATIENT)
Dept: FAMILY MEDICINE CLINIC | Facility: CLINIC | Age: 39
End: 2019-12-11

## 2019-12-11 DIAGNOSIS — Z11.1 ENCOUNTER FOR PPD SKIN TEST READING: Primary | ICD-10-CM

## 2019-12-11 LAB
INDURATION: NORMAL MM
TB SKIN TEST: NEGATIVE

## 2019-12-26 ENCOUNTER — OFFICE VISIT (OUTPATIENT)
Dept: URGENT CARE | Facility: CLINIC | Age: 39
End: 2019-12-26
Payer: COMMERCIAL

## 2019-12-26 VITALS
HEIGHT: 62 IN | DIASTOLIC BLOOD PRESSURE: 65 MMHG | HEART RATE: 78 BPM | BODY MASS INDEX: 38.64 KG/M2 | TEMPERATURE: 99 F | WEIGHT: 210 LBS | RESPIRATION RATE: 18 BRPM | SYSTOLIC BLOOD PRESSURE: 136 MMHG

## 2019-12-26 DIAGNOSIS — R30.0 DYSURIA: Primary | ICD-10-CM

## 2019-12-26 LAB
SL AMB  POCT GLUCOSE, UA: ABNORMAL
SL AMB LEUKOCYTE ESTERASE,UA: ABNORMAL
SL AMB POCT BILIRUBIN,UA: ABNORMAL
SL AMB POCT BLOOD,UA: ABNORMAL
SL AMB POCT CLARITY,UA: ABNORMAL
SL AMB POCT COLOR,UA: YELLOW
SL AMB POCT KETONES,UA: ABNORMAL
SL AMB POCT NITRITE,UA: ABNORMAL
SL AMB POCT PH,UA: 5
SL AMB POCT SPECIFIC GRAVITY,UA: 1.03
SL AMB POCT URINE PROTEIN: ABNORMAL
SL AMB POCT UROBILINOGEN: 0.2

## 2019-12-26 PROCEDURE — 87086 URINE CULTURE/COLONY COUNT: CPT | Performed by: NURSE PRACTITIONER

## 2019-12-26 PROCEDURE — 81002 URINALYSIS NONAUTO W/O SCOPE: CPT | Performed by: NURSE PRACTITIONER

## 2019-12-26 PROCEDURE — 99213 OFFICE O/P EST LOW 20 MIN: CPT | Performed by: NURSE PRACTITIONER

## 2019-12-26 NOTE — PATIENT INSTRUCTIONS
We will send your urine for culture and call if it is positive   Increase fluids  Tylenol/Motrin as needed for pain   Follow up with your PCP and GYN for routine exam    If pain worsens proceed to the closest emergency department  Abdominal Pain   WHAT YOU NEED TO KNOW:   Abdominal pain can be dull, achy, or sharp  You may have pain in one area of your abdomen, or in your entire abdomen  Your pain may be caused by a condition such as constipation, food sensitivity or poisoning, infection, or a blockage  Abdominal pain can also be from a hernia, appendicitis, or an ulcer  Liver, gallbladder, or kidney conditions can also cause abdominal pain  The cause of your abdominal pain may be unknown  DISCHARGE INSTRUCTIONS:   Return to the emergency department if:   · You have new chest pain or shortness of breath  · You have pulsing pain in your upper abdomen or lower back that suddenly becomes constant  · Your pain is in the right lower abdominal area and worsens with movement  · You have a fever over 100 4°F (38°C) or shaking chills  · You are vomiting and cannot keep food or liquids down  · Your pain does not improve or gets worse over the next 8 to 12 hours  · You see blood in your vomit or bowel movements, or they look black and tarry  · Your skin or the whites of your eyes turn yellow  · You are a woman and have a large amount of vaginal bleeding that is not your monthly period  Contact your healthcare provider if:   · You have pain in your lower back  · You are a man and have pain in your testicles  · You have pain when you urinate  · You have questions or concerns about your condition or care  Follow up with your healthcare provider within 24 hours or as directed:  Write down your questions so you remember to ask them during your visits  Medicines:   · Medicines  may be given to calm your stomach and prevent vomiting or to decrease pain   Ask how to take pain medicine safely  · Take your medicine as directed  Contact your healthcare provider if you think your medicine is not helping or if you have side effects  Tell him of her if you are allergic to any medicine  Keep a list of the medicines, vitamins, and herbs you take  Include the amounts, and when and why you take them  Bring the list or the pill bottles to follow-up visits  Carry your medicine list with you in case of an emergency  © 2017 [employer] Information is for End User's use only and may not be sold, redistributed or otherwise used for commercial purposes  All illustrations and images included in CareNotes® are the copyrighted property of [employer]  or Doni Perez  The above information is an  only  It is not intended as medical advice for individual conditions or treatments  Talk to your doctor, nurse or pharmacist before following any medical regimen to see if it is safe and effective for you
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
## 2019-12-27 ENCOUNTER — HOSPITAL ENCOUNTER (OUTPATIENT)
Dept: RADIOLOGY | Age: 39
Discharge: HOME | End: 2019-12-27
Payer: COMMERCIAL

## 2019-12-27 ENCOUNTER — OFFICE VISIT (OUTPATIENT)
Dept: FAMILY MEDICINE CLINIC | Facility: CLINIC | Age: 39
End: 2019-12-27
Payer: COMMERCIAL

## 2019-12-27 VITALS
WEIGHT: 210 LBS | HEIGHT: 62 IN | SYSTOLIC BLOOD PRESSURE: 118 MMHG | DIASTOLIC BLOOD PRESSURE: 78 MMHG | HEART RATE: 74 BPM | BODY MASS INDEX: 38.64 KG/M2 | TEMPERATURE: 99 F

## 2019-12-27 DIAGNOSIS — R10.84 GENERALIZED ABDOMINAL PAIN: Primary | ICD-10-CM

## 2019-12-27 DIAGNOSIS — R31.9 URINARY TRACT INFECTION WITH HEMATURIA, SITE UNSPECIFIED: ICD-10-CM

## 2019-12-27 DIAGNOSIS — R10.84 GENERALIZED ABDOMINAL PAIN: ICD-10-CM

## 2019-12-27 DIAGNOSIS — N39.0 URINARY TRACT INFECTION WITH HEMATURIA, SITE UNSPECIFIED: ICD-10-CM

## 2019-12-27 LAB
BACTERIA UR CULT: NORMAL
SL AMB  POCT GLUCOSE, UA: NEGATIVE
SL AMB LEUKOCYTE ESTERASE,UA: ABNORMAL
SL AMB POCT BILIRUBIN,UA: NEGATIVE
SL AMB POCT BLOOD,UA: ABNORMAL
SL AMB POCT CLARITY,UA: CLEAR
SL AMB POCT COLOR,UA: YELLOW
SL AMB POCT KETONES,UA: NEGATIVE
SL AMB POCT NITRITE,UA: NEGATIVE
SL AMB POCT PH,UA: 5
SL AMB POCT SPECIFIC GRAVITY,UA: 1
SL AMB POCT URINE PROTEIN: ABNORMAL
SL AMB POCT UROBILINOGEN: 0.2

## 2019-12-27 PROCEDURE — 3008F BODY MASS INDEX DOCD: CPT | Performed by: FAMILY MEDICINE

## 2019-12-27 PROCEDURE — 81002 URINALYSIS NONAUTO W/O SCOPE: CPT | Performed by: FAMILY MEDICINE

## 2019-12-27 PROCEDURE — 1036F TOBACCO NON-USER: CPT | Performed by: FAMILY MEDICINE

## 2019-12-27 PROCEDURE — 99213 OFFICE O/P EST LOW 20 MIN: CPT | Performed by: FAMILY MEDICINE

## 2019-12-27 PROCEDURE — 74177 CT ABD & PELVIS W/CONTRAST: CPT

## 2019-12-27 RX ORDER — SULFAMETHOXAZOLE AND TRIMETHOPRIM 800; 160 MG/1; MG/1
1 TABLET ORAL 2 TIMES DAILY
Qty: 6 TABLET | Refills: 0 | Status: SHIPPED | OUTPATIENT
Start: 2019-12-27 | End: 2019-12-30

## 2019-12-27 RX ORDER — OMEPRAZOLE 40 MG/1
40 CAPSULE, DELAYED RELEASE ORAL DAILY
Qty: 30 CAPSULE | Refills: 0 | Status: SHIPPED | OUTPATIENT
Start: 2019-12-27 | End: 2020-02-10

## 2019-12-27 RX ADMIN — IOHEXOL 50 ML: 240 INJECTION, SOLUTION INTRATHECAL; INTRAVASCULAR; INTRAVENOUS; ORAL at 13:38

## 2019-12-27 RX ADMIN — IOHEXOL 100 ML: 350 INJECTION, SOLUTION INTRAVENOUS at 13:38
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
## 2020-01-13 ENCOUNTER — ANNUAL EXAM (OUTPATIENT)
Dept: OBGYN CLINIC | Facility: CLINIC | Age: 40
End: 2020-01-13
Payer: COMMERCIAL

## 2020-01-13 VITALS
HEIGHT: 62 IN | DIASTOLIC BLOOD PRESSURE: 80 MMHG | SYSTOLIC BLOOD PRESSURE: 122 MMHG | BODY MASS INDEX: 38.64 KG/M2 | WEIGHT: 210 LBS

## 2020-01-13 DIAGNOSIS — Z12.31 SCREENING MAMMOGRAM FOR HIGH-RISK PATIENT: ICD-10-CM

## 2020-01-13 DIAGNOSIS — Z01.419 CERVICAL SMEAR, AS PART OF ROUTINE GYNECOLOGICAL EXAMINATION: ICD-10-CM

## 2020-01-13 DIAGNOSIS — N76.0 ACUTE VAGINITIS: ICD-10-CM

## 2020-01-13 DIAGNOSIS — B37.49 GENITAL CANDIDIASIS: ICD-10-CM

## 2020-01-13 DIAGNOSIS — M54.50 ACUTE LOW BACK PAIN, UNSPECIFIED BACK PAIN LATERALITY, UNSPECIFIED WHETHER SCIATICA PRESENT: ICD-10-CM

## 2020-01-13 DIAGNOSIS — N72 ACUTE CERVICITIS: ICD-10-CM

## 2020-01-13 DIAGNOSIS — Z11.3 SCREENING EXAMINATION FOR STD (SEXUALLY TRANSMITTED DISEASE): ICD-10-CM

## 2020-01-13 DIAGNOSIS — Z11.51 SPECIAL SCREENING EXAMINATION FOR HUMAN PAPILLOMAVIRUS (HPV): ICD-10-CM

## 2020-01-13 DIAGNOSIS — Z11.3 ROUTINE SCREENING FOR STI (SEXUALLY TRANSMITTED INFECTION): ICD-10-CM

## 2020-01-13 DIAGNOSIS — Z01.419 ENCOUNTER FOR WELL WOMAN EXAM: Primary | ICD-10-CM

## 2020-01-13 DIAGNOSIS — N20.0 KIDNEY STONES: ICD-10-CM

## 2020-01-13 LAB
SL AMB  POCT GLUCOSE, UA: NEGATIVE
SL AMB LEUKOCYTE ESTERASE,UA: NEGATIVE
SL AMB POCT BILIRUBIN,UA: NEGATIVE
SL AMB POCT BLOOD,UA: NEGATIVE
SL AMB POCT KETONES,UA: NEGATIVE
SL AMB POCT NITRITE,UA: NEGATIVE
SL AMB POCT PH,UA: NEGATIVE
SL AMB POCT SPECIFIC GRAVITY,UA: NEGATIVE
SL AMB POCT URINE PROTEIN: NEGATIVE
SL AMB POCT UROBILINOGEN: NEGATIVE

## 2020-01-13 PROCEDURE — 99396 PREV VISIT EST AGE 40-64: CPT | Performed by: PHYSICIAN ASSISTANT

## 2020-01-13 PROCEDURE — 81002 URINALYSIS NONAUTO W/O SCOPE: CPT | Performed by: PHYSICIAN ASSISTANT

## 2020-01-13 PROCEDURE — 3008F BODY MASS INDEX DOCD: CPT | Performed by: PHYSICIAN ASSISTANT
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
## 2020-01-13 NOTE — PROGRESS NOTES
Pt os here for yearly and is having cramping when on on her period, along with odor  Pt is having regular cycles, but they are heavy first couple days, with cramping  Pt has complaints of odor with no discharge  Also doesn't feel like she it totally emptying her bladder,but had no burning or urgency/frewuency  5/10/17 Normal Pap

## 2020-01-16 LAB
A VAGINAE DNA VAG NAA+PROBE-LOG#: <3.25
A VAGINAE DNA VAG QL NAA+PROBE: NOT DETECTED
BVAB2 DNA VAG QL NAA+PROBE: NOT DETECTED
C TRACH DNA SPEC QL PROBE+SIG AMP: NOT DETECTED
G VAGINALIS DNA SPEC QL NAA+PROBE: NOT DETECTED
G VAGINALIS DNA VAG NAA+PROBE-LOG#: <3.25
HPV HR 12 DNA CVX QL NAA+PROBE: NOT DETECTED
HPV16 DNA SPEC QL NAA+PROBE: NOT DETECTED
HPV18 DNA SPEC QL NAA+PROBE: NOT DETECTED
HSV1 DNA SPEC QL NAA+PROBE: NOT DETECTED
HSV2 DNA SPEC QL NAA+PROBE: NOT DETECTED
LACTOBACILLUS DNA VAG NAA+PROBE-LOG#: <3.25
LACTOBACILLUS DNA VAG NAA+PROBE-LOG#: NOT DETECTED
M GENITALIUM DNA SPEC QL NAA+PROBE: NOT DETECTED
MEGA1 DNA VAG QL NAA+PROBE: NOT DETECTED
N GONORRHOEA DNA SPEC QL NAA+PROBE: NOT DETECTED
SL AMB C.ALBICANS BY PCR: NOT DETECTED
SL AMB CANDIDA GENUS: NOT DETECTED
T VAGINALIS RRNA SPEC QL NAA+PROBE: NOT DETECTED
T VAGINALIS RRNA SPEC QL NAA+PROBE: NOT DETECTED
THIN PREP CVX: NORMAL
U UREALYTICUM DNA SPEC QL NAA+PROBE: NOT DETECTED

## 2020-02-09 DIAGNOSIS — R10.84 GENERALIZED ABDOMINAL PAIN: ICD-10-CM

## 2020-02-10 RX ORDER — OMEPRAZOLE 40 MG/1
CAPSULE, DELAYED RELEASE ORAL
Qty: 30 CAPSULE | Refills: 0 | Status: SHIPPED | OUTPATIENT
Start: 2020-02-10 | End: 2020-03-19

## 2020-02-22 ENCOUNTER — OFFICE VISIT (OUTPATIENT)
Dept: URGENT CARE | Facility: CLINIC | Age: 40
End: 2020-02-22
Payer: COMMERCIAL

## 2020-02-22 VITALS
BODY MASS INDEX: 38.28 KG/M2 | TEMPERATURE: 98.5 F | OXYGEN SATURATION: 98 % | SYSTOLIC BLOOD PRESSURE: 112 MMHG | HEART RATE: 77 BPM | HEIGHT: 62 IN | WEIGHT: 208 LBS | RESPIRATION RATE: 16 BRPM | DIASTOLIC BLOOD PRESSURE: 73 MMHG

## 2020-02-22 DIAGNOSIS — S60.211A CONTUSION OF RIGHT WRIST, INITIAL ENCOUNTER: Primary | ICD-10-CM

## 2020-02-22 DIAGNOSIS — S93.491A SPRAIN OF ANTERIOR TALOFIBULAR LIGAMENT OF RIGHT ANKLE, INITIAL ENCOUNTER: ICD-10-CM
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
## 2020-02-22 PROCEDURE — 99213 OFFICE O/P EST LOW 20 MIN: CPT | Performed by: NURSE PRACTITIONER
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
## 2020-02-22 NOTE — PATIENT INSTRUCTIONS
Ice to wrist and foot/ankle   Rest and elevate   Ibuprofen or Aleve as directed- take with food   Wear a good supportive shoe  Follow up with your PCP for worsening symptoms     Sprain   WHAT YOU NEED TO KNOW:   A sprain happens when a ligament is stretched or torn  Ligaments are tough tissues that connect bones  Ligaments support your joints and keep your bones in place  They allow you to lift, lower, or rotate your arms and legs  A sprain may involve one or more ligaments  DISCHARGE INSTRUCTIONS:   Return to the emergency department if:   · You have numbness or tingling below the injury, such as in your fingers or toes  · The skin over your sprained area is blue or pale  · Your pain has increased or returned, even after you take pain medicine  Contact your healthcare provider if:   · Your symptoms do not better  · Your swelling has increased or returned  · Your joint becomes more weak or unstable  · You have questions or concerns about your condition or care  Medicines:   · Prescription pain medicine  may be given  Ask how to take this medicine safely  · Acetaminophen  decreases pain and fever  It is available without a doctor's order  Ask how much to take and how often to take it  Follow directions  Acetaminophen can cause liver damage if not taken correctly  · NSAIDs , such as ibuprofen, help decrease swelling, pain, and fever  This medicine is available with or without a doctor's order  NSAIDs can cause stomach bleeding or kidney problems in certain people  If you take blood thinner medicine, always ask your healthcare provider if NSAIDs are safe for you  Always read the medicine label and follow directions  · Take your medicine as directed  Contact your healthcare provider if you think your medicine is not helping or if you have side effects  Tell him or her if you are allergic to any medicine  Keep a list of the medicines, vitamins, and herbs you take   Include the amounts, and when and why you take them  Bring the list or the pill bottles to follow-up visits  Carry your medicine list with you in case of an emergency  Support devices:  Support services, such as an elastic bandage, splint, brace, or cast may be needed  These devices limit your movement and protect your joint  You may need to use crutches if the sprain is in your leg  This will help decrease your pain as you move around  Self-care:   · Rest  your joint so that it can heal  Return to normal activities as directed  · Apply ice  on your injury for 15 to 20 minutes every hour or as directed  Use an ice pack, or put crushed ice in a plastic bag  Cover it with a towel  Ice helps prevent tissue damage and decreases swelling and pain  · Compress  the injured area as directed  Ask your healthcare provider if you should wrap an elastic bandage around your injured ligament  An elastic bandage provides support and helps decrease swelling and movement so your joint can heal      · Elevate  the injured area above the level of your heart as often as you can  This will help decrease swelling and pain  Prop the injured area on pillows or blankets to keep it elevated comfortably  Physical therapy:  A physical therapist teaches you exercises to help improve movement and strength, and to decrease pain  Prevent another sprain:  Regular exercise can strengthen your muscles and help prevent another injury  Do the following before you begin or return to regular exercise or sports training:  · Ask your healthcare provider about the activities you can do  Find out how long your ligament needs to heal  Do not do any physical activity until your healthcare provider says it is okay  If you start activity too soon, you may develop a more serious injury  · Always warm up and stretch  before your regular exercise, sport, or physical activity  · Take it slow  Slowly increase how often and how long you exercise or train   Sudden increases in how often you train may cause you to overstretch or tear your ligament  · Use the right equipment  Always wear shoes that fit well and are made for the activity that you are doing  You may also use ankle supports, elbow and knee pads, or braces  Follow up with your healthcare provider as directed:  Write down your questions so you remember to ask them during your visits  © 2017 [employer] Information is for End User's use only and may not be sold, redistributed or otherwise used for commercial purposes  All illustrations and images included in CareNotes® are the copyrighted property of A [employer]  or [employer]  The above information is an  only  It is not intended as medical advice for individual conditions or treatments  Talk to your doctor, nurse or pharmacist before following any medical regimen to see if it is safe and effective for you  Contusion in Adults   WHAT YOU NEED TO KNOW:   A contusion is a bruise that appears on your skin after an injury  A bruise happens when small blood vessels tear but skin does not  When blood vessels tear, blood leaks into nearby tissue, such as soft tissue or muscle  DISCHARGE INSTRUCTIONS:   Return to the emergency department if:   · You have new trouble moving the injured area  · You have tingling or numbness in or near the injured area  · Your hand or foot below the bruise gets cold or turns pale  Contact your healthcare provider if:   · You find a new lump in the injured area  · Your symptoms do not improve with treatment after 4 to 5 days  · You have questions or concerns about your condition or care  Medicines: You may need any of the following:  · NSAIDs  help decrease swelling and pain or fever  This medicine is available with or without a doctor's order  NSAIDs can cause stomach bleeding or kidney problems in certain people   If you take blood thinner medicine, always ask your healthcare provider if NSAIDs are safe for you  Always read the medicine label and follow directions  · Prescription pain medicine  may be given  Do not wait until the pain is severe before you take your medicine  · Take your medicine as directed  Contact your healthcare provider if you think your medicine is not helping or if you have side effects  Tell him of her if you are allergic to any medicine  Keep a list of the medicines, vitamins, and herbs you take  Include the amounts, and when and why you take them  Bring the list or the pill bottles to follow-up visits  Carry your medicine list with you in case of an emergency  Follow up with your healthcare provider as directed: You may need to return within a week to check your injury again  Write down your questions so you remember to ask them during your visits  Help a contusion heal:   · Rest the injured area  or use it less than usual  If you bruised your leg or foot, you may need crutches or a cane to help you walk  This will help you keep weight off your injured body part  · Apply ice  to decrease swelling and pain  Ice may also help prevent tissue damage  Use an ice pack, or put crushed ice in a plastic bag  Cover it with a towel and place it on your bruise for 15 to 20 minutes every hour or as directed  · Use compression  to support the area and decrease swelling  Wrap an elastic bandage around the area over the bruised muscle  Make sure the bandage is not too tight  You should be able to fit 1 finger between the bandage and your skin  · Elevate (raise) your injured body part  above the level of your heart to help decrease pain and swelling  Use pillows, blankets, or rolled towels to elevate the area as often as you can  · Do not drink alcohol  as directed  Alcohol may slow healing  · Do not stretch injured muscles  right after your injury  Ask your healthcare provider when and how you may safely stretch after your injury   Gentle stretches can help increase your flexibility  · Do not massage the area or put heating pads  on the bruise right after your injury  Heat and massage may slow healing  Your healthcare provider may tell you to apply heat after several days  At that time, heat will start to help the injury heal   Prevent another contusion:   · Stretch and warm up before you play sports or exercise  · Wear protective gear when you play sports  Examples are shin guards and padding  · If you begin a new physical activity, start slowly to give your body a chance to adjust   © 2017 [facility] [facility] Information is for End User's use only and may not be sold, redistributed or otherwise used for commercial purposes  All illustrations and images included in CareNotes® are the copyrighted property of [employer]  or Doni Perez  The above information is an  only  It is not intended as medical advice for individual conditions or treatments  Talk to your doctor, nurse or pharmacist before following any medical regimen to see if it is safe and effective for you

## 2020-03-19 DIAGNOSIS — R10.84 GENERALIZED ABDOMINAL PAIN: ICD-10-CM

## 2020-03-19 RX ORDER — OMEPRAZOLE 40 MG/1
CAPSULE, DELAYED RELEASE ORAL
Qty: 90 CAPSULE | Refills: 0 | Status: SHIPPED | OUTPATIENT
Start: 2020-03-19 | End: 2020-11-12

## 2020-10-20 ENCOUNTER — OFFICE VISIT (OUTPATIENT)
Dept: FAMILY MEDICINE CLINIC | Facility: CLINIC | Age: 40
End: 2020-10-20
Payer: COMMERCIAL

## 2020-10-20 VITALS
BODY MASS INDEX: 37.54 KG/M2 | HEART RATE: 76 BPM | TEMPERATURE: 98.2 F | WEIGHT: 204 LBS | DIASTOLIC BLOOD PRESSURE: 82 MMHG | SYSTOLIC BLOOD PRESSURE: 120 MMHG | HEIGHT: 62 IN

## 2020-10-20 DIAGNOSIS — L30.9 DERMATITIS: Primary | ICD-10-CM

## 2020-10-20 PROCEDURE — 99213 OFFICE O/P EST LOW 20 MIN: CPT | Performed by: FAMILY MEDICINE

## 2020-10-20 PROCEDURE — 1036F TOBACCO NON-USER: CPT | Performed by: FAMILY MEDICINE

## 2020-10-20 RX ORDER — OYSTER SHELL CALCIUM WITH VITAMIN D 500; 200 MG/1; [IU]/1
1 TABLET, FILM COATED ORAL
COMMUNITY

## 2020-10-20 RX ORDER — OMEGA-3-ACID ETHYL ESTERS 1 G/1
2 CAPSULE, LIQUID FILLED ORAL 2 TIMES DAILY
COMMUNITY

## 2020-10-20 RX ORDER — CLOTRIMAZOLE AND BETAMETHASONE DIPROPIONATE 10; .64 MG/G; MG/G
CREAM TOPICAL 2 TIMES DAILY
Qty: 30 G | Refills: 0 | Status: SHIPPED | OUTPATIENT
Start: 2020-10-20 | End: 2022-06-23

## 2020-10-20 RX ORDER — PREDNISOLONE ACETATE 10 MG/ML
SUSPENSION/ DROPS OPHTHALMIC
COMMUNITY
Start: 2020-09-23 | End: 2022-06-23 | Stop reason: ALTCHOICE

## 2020-10-24 DIAGNOSIS — F41.9 ANXIETY: ICD-10-CM

## 2020-10-25 RX ORDER — ESCITALOPRAM OXALATE 10 MG/1
TABLET ORAL
Qty: 90 TABLET | Refills: 2 | Status: SHIPPED | OUTPATIENT
Start: 2020-10-25 | End: 2021-08-26

## 2020-11-12 DIAGNOSIS — R10.84 GENERALIZED ABDOMINAL PAIN: ICD-10-CM

## 2020-11-12 RX ORDER — OMEPRAZOLE 40 MG/1
CAPSULE, DELAYED RELEASE ORAL
Qty: 90 CAPSULE | Refills: 0 | Status: SHIPPED | OUTPATIENT
Start: 2020-11-12 | End: 2022-06-23 | Stop reason: ALTCHOICE

## 2021-08-13 ENCOUNTER — TELEPHONE (OUTPATIENT)
Dept: FAMILY MEDICINE CLINIC | Facility: CLINIC | Age: 41
End: 2021-08-13

## 2021-08-13 NOTE — TELEPHONE ENCOUNTER
Patient is asking if you think its safe for her to get vaccinated     Patient has a severe reaction to all "INE meds"  She has seizures    She read that the vaccines have these type of meds in them     Please advise

## 2021-08-16 NOTE — TELEPHONE ENCOUNTER
The only true contraindication is anaphylaxis to the shot   The risk is still less that the risk of her getting very sick if she were to contract COVD

## 2021-10-18 ENCOUNTER — OFFICE VISIT (OUTPATIENT)
Dept: FAMILY MEDICINE CLINIC | Facility: CLINIC | Age: 41
End: 2021-10-18
Payer: COMMERCIAL

## 2021-10-18 VITALS
HEART RATE: 78 BPM | BODY MASS INDEX: 38.64 KG/M2 | HEIGHT: 62 IN | TEMPERATURE: 98 F | DIASTOLIC BLOOD PRESSURE: 82 MMHG | WEIGHT: 210 LBS | SYSTOLIC BLOOD PRESSURE: 124 MMHG

## 2021-10-18 DIAGNOSIS — H69.83 EUSTACHIAN TUBE DYSFUNCTION, BILATERAL: ICD-10-CM

## 2021-10-18 DIAGNOSIS — B34.9 VIRAL ILLNESS: Primary | ICD-10-CM

## 2021-10-18 PROBLEM — N92.0 HEAVY PERIODS: Status: ACTIVE | Noted: 2020-08-25

## 2021-10-18 PROBLEM — R10.2 CHRONIC PELVIC PAIN IN FEMALE: Status: ACTIVE | Noted: 2020-08-25

## 2021-10-18 PROBLEM — G89.29 CHRONIC PELVIC PAIN IN FEMALE: Status: ACTIVE | Noted: 2020-08-25

## 2021-10-18 PROCEDURE — 99213 OFFICE O/P EST LOW 20 MIN: CPT | Performed by: FAMILY MEDICINE

## 2021-10-18 PROCEDURE — 1036F TOBACCO NON-USER: CPT | Performed by: FAMILY MEDICINE

## 2021-10-18 PROCEDURE — 3725F SCREEN DEPRESSION PERFORMED: CPT | Performed by: FAMILY MEDICINE

## 2021-10-18 PROCEDURE — 3008F BODY MASS INDEX DOCD: CPT | Performed by: FAMILY MEDICINE

## 2021-12-01 ENCOUNTER — OFFICE VISIT (OUTPATIENT)
Dept: FAMILY MEDICINE CLINIC | Facility: CLINIC | Age: 41
End: 2021-12-01
Payer: COMMERCIAL

## 2021-12-01 VITALS
HEART RATE: 76 BPM | TEMPERATURE: 98.2 F | HEIGHT: 62 IN | BODY MASS INDEX: 38.64 KG/M2 | DIASTOLIC BLOOD PRESSURE: 80 MMHG | SYSTOLIC BLOOD PRESSURE: 122 MMHG | WEIGHT: 210 LBS

## 2021-12-01 DIAGNOSIS — Z13.6 SCREENING FOR CARDIOVASCULAR, RESPIRATORY, AND GENITOURINARY DISEASES: ICD-10-CM

## 2021-12-01 DIAGNOSIS — Z00.00 ANNUAL PHYSICAL EXAM: Primary | ICD-10-CM

## 2021-12-01 DIAGNOSIS — Z13.83 SCREENING FOR CARDIOVASCULAR, RESPIRATORY, AND GENITOURINARY DISEASES: ICD-10-CM

## 2021-12-01 DIAGNOSIS — Z13.89 SCREENING FOR CARDIOVASCULAR, RESPIRATORY, AND GENITOURINARY DISEASES: ICD-10-CM

## 2021-12-01 DIAGNOSIS — M25.531 RIGHT WRIST PAIN: ICD-10-CM

## 2021-12-01 PROCEDURE — 3008F BODY MASS INDEX DOCD: CPT | Performed by: FAMILY MEDICINE

## 2021-12-01 PROCEDURE — 99396 PREV VISIT EST AGE 40-64: CPT | Performed by: FAMILY MEDICINE

## 2021-12-01 PROCEDURE — 3725F SCREEN DEPRESSION PERFORMED: CPT | Performed by: FAMILY MEDICINE

## 2021-12-01 PROCEDURE — 1036F TOBACCO NON-USER: CPT | Performed by: FAMILY MEDICINE

## 2021-12-07 ENCOUNTER — TELEPHONE (OUTPATIENT)
Dept: ADMINISTRATIVE | Facility: OTHER | Age: 41
End: 2021-12-07

## 2022-01-27 ENCOUNTER — OFFICE VISIT (OUTPATIENT)
Dept: OBGYN CLINIC | Facility: CLINIC | Age: 42
End: 2022-01-27
Payer: COMMERCIAL

## 2022-01-27 VITALS
BODY MASS INDEX: 36.99 KG/M2 | WEIGHT: 201 LBS | DIASTOLIC BLOOD PRESSURE: 71 MMHG | HEART RATE: 96 BPM | SYSTOLIC BLOOD PRESSURE: 115 MMHG | HEIGHT: 62 IN

## 2022-01-27 DIAGNOSIS — M77.8 RIGHT WRIST TENDINITIS: ICD-10-CM

## 2022-01-27 DIAGNOSIS — G56.01 CARPAL TUNNEL SYNDROME ON RIGHT: Primary | ICD-10-CM

## 2022-01-27 DIAGNOSIS — M25.531 RIGHT WRIST PAIN: ICD-10-CM

## 2022-01-27 DIAGNOSIS — G56.21 CUBITAL TUNNEL SYNDROME ON RIGHT: ICD-10-CM

## 2022-01-27 PROCEDURE — 3008F BODY MASS INDEX DOCD: CPT | Performed by: SURGERY

## 2022-01-27 PROCEDURE — 1036F TOBACCO NON-USER: CPT | Performed by: SURGERY

## 2022-01-27 PROCEDURE — 99204 OFFICE O/P NEW MOD 45 MIN: CPT | Performed by: SURGERY

## 2022-01-27 RX ORDER — METHYLPREDNISOLONE 4 MG/1
TABLET ORAL
Qty: 1 EACH | Refills: 0 | Status: SHIPPED | OUTPATIENT
Start: 2022-01-27 | End: 2022-06-23
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
## 2022-01-27 NOTE — PATIENT INSTRUCTIONS
What to do:     1  Wrist brace at night only     2  Occupational therapy for wrist tendinitis, strengthening, nerve glides, etc  (please work with a Hand Therapist)  Keep up with home exercises, consistency is key! 3  Medrol dose eris (Steroids) - take as directed, no anti-inflammatories while on this medication  May stop if cannot tolerate  4  Ultrasound evaluation of wrist and elbow nerves  Get EMG from [facility] if you can     5  Follow up with us in about 6 weeks  Call if you have questions!  [phone]

## 2022-02-01 DIAGNOSIS — F41.9 ANXIETY: ICD-10-CM

## 2022-02-01 RX ORDER — ESCITALOPRAM OXALATE 10 MG/1
TABLET ORAL
Qty: 30 TABLET | Refills: 2 | Status: SHIPPED | OUTPATIENT
Start: 2022-02-01 | End: 2022-06-03

## 2022-02-02 ENCOUNTER — EVALUATION (OUTPATIENT)
Dept: OCCUPATIONAL THERAPY | Facility: MEDICAL CENTER | Age: 42
End: 2022-02-02

## 2022-02-02 DIAGNOSIS — G56.01 CARPAL TUNNEL SYNDROME ON RIGHT: Primary | ICD-10-CM

## 2022-02-02 DIAGNOSIS — G56.21 CUBITAL TUNNEL SYNDROME ON RIGHT: ICD-10-CM

## 2022-02-02 DIAGNOSIS — M77.8 RIGHT WRIST TENDINITIS: ICD-10-CM

## 2022-02-02 PROCEDURE — 97165 OT EVAL LOW COMPLEX 30 MIN: CPT

## 2022-02-02 PROCEDURE — 97110 THERAPEUTIC EXERCISES: CPT
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
## 2022-02-07 ENCOUNTER — OFFICE VISIT (OUTPATIENT)
Dept: OCCUPATIONAL THERAPY | Facility: MEDICAL CENTER | Age: 42
End: 2022-02-07
Payer: COMMERCIAL

## 2022-02-07 DIAGNOSIS — G56.21 CUBITAL TUNNEL SYNDROME ON RIGHT: ICD-10-CM

## 2022-02-07 DIAGNOSIS — G56.01 CARPAL TUNNEL SYNDROME ON RIGHT: Primary | ICD-10-CM

## 2022-02-07 PROCEDURE — 97530 THERAPEUTIC ACTIVITIES: CPT

## 2022-02-07 PROCEDURE — 97110 THERAPEUTIC EXERCISES: CPT

## 2022-02-07 PROCEDURE — 97140 MANUAL THERAPY 1/> REGIONS: CPT

## 2022-02-07 NOTE — PROGRESS NOTES
Daily Note     Today's date: 2022  Patient name: An Sierra  : 1980  MRN: [id number]  Referring provider: Maggie Levy MD  Dx:   Encounter Diagnosis     ICD-10-CM    1  Carpal tunnel syndrome on right  G56 01    2  Cubital tunnel syndrome on right  G56 21                   Subjective: "The tape was working, but then I was playing pickleball ball it was getting numb "    Objective: See treatment diary below       Precautions: Universal     Manuals HEP    Forearm Flexors IASTM  10 min   K-tape thumb spica  yes             Ther Ex     Education on HEP and dx x5 min  10 min   Median Nerve Glides X10, twice a day X 10   Forearm Flexor/Extensor Stretch 3x15 sec 3 x 15 sec each exercise   Ulnar Nerve Glides X 10, twice a day X 10    Digit Extension (Yellow) X 10, once a day X 15   Yellow Clip Opposition  X 5 each finger         Therapeutic Activities     Towel Scrunches X 10 X 10   Towel Squeezes  X 10 X 10                       Modalities     Heat 5-10 min 5 min          Therapist supervised patient with use of modalities during today's treatment session  Skin integrity was assessed and appeared normal following use of modalities  Assessment:   Patient tolerated session well  Patient demonstrates soft tissue tightness, pain with function, and decreased strength upon assessment today  Pt session focused manual therapy IASTM for decreased soft tissue tightness through forearm flexors/extensors and carry over with nerve glide exercises  Introduced ulnar nerve glides for cubtial tunnel, demonstrated understanding  Introduced new therapeutic activities and exercises today with minimal numbness/tingling  Plan:   Focus on decreased pain with function, AROM, strengthening to improve ability to complete daily activites with ease    [address] 2022-3/30/2022

## 2022-02-09 ENCOUNTER — APPOINTMENT (OUTPATIENT)
Dept: OCCUPATIONAL THERAPY | Facility: MEDICAL CENTER | Age: 42
End: 2022-02-09
Payer: COMMERCIAL

## 2022-02-14 ENCOUNTER — OFFICE VISIT (OUTPATIENT)
Dept: OCCUPATIONAL THERAPY | Facility: MEDICAL CENTER | Age: 42
End: 2022-02-14
Payer: COMMERCIAL

## 2022-02-14 DIAGNOSIS — G56.21 CUBITAL TUNNEL SYNDROME ON RIGHT: ICD-10-CM

## 2022-02-14 DIAGNOSIS — G56.01 CARPAL TUNNEL SYNDROME ON RIGHT: Primary | ICD-10-CM

## 2022-02-14 PROCEDURE — 97530 THERAPEUTIC ACTIVITIES: CPT

## 2022-02-14 PROCEDURE — 97110 THERAPEUTIC EXERCISES: CPT

## 2022-02-14 PROCEDURE — 97140 MANUAL THERAPY 1/> REGIONS: CPT

## 2022-02-14 NOTE — PROGRESS NOTES
Daily Note     Today's date: 2022  Patient name: Mariza Blair  : 1980  MRN: [id number]  Referring provider: Tennille Theodore MD  Dx:   Encounter Diagnosis     ICD-10-CM    1  Carpal tunnel syndrome on right  G56 01    2  Cubital tunnel syndrome on right  G56 21                   Subjective: "It has been so numb  It has been way more numb during pickleball and activities "    Objective: See treatment diary below     Precautions: Universal     Manuals HEP    Forearm Flexors IASTM  10 min   K-tape thumb spica  yes             Ther Ex     Education on HEP and dx x5 min  10 min   Median Nerve Glides X10, twice a day X 10   Forearm Flexor/Extensor Stretch 3x15 sec 3 x 15 sec each exercise   Ulnar Nerve Glides X 10, twice a day X 10    Digit Extension (Yellow) X 10, once a day    Yellow Clip Opposition          Therapeutic Activities     Towel Scrunches X 10 X 10   Towel Squeezes  X 10 X 10                       Modalities     Heat 5-10 min 5 min          Therapist supervised patient with use of modalities during today's treatment session  Skin integrity was assessed and appeared normal following use of modalities  Assessment:   Patient tolerated session well  Patient demonstrates soft tissue tightness, pain with function, and decreased strength upon assessment today  Pt reported increased numbness and tingling through digits today  Continued with nerve glides and therapeutic activities  Did not progress pt due to reported numbness/tingling increase  Plan:   Focus on decreased pain with function, AROM, strengthening to improve ability to complete daily activites with ease    [address] 2022-3/30/2022

## 2022-02-16 ENCOUNTER — OFFICE VISIT (OUTPATIENT)
Dept: OCCUPATIONAL THERAPY | Facility: MEDICAL CENTER | Age: 42
End: 2022-02-16
Payer: COMMERCIAL

## 2022-02-16 DIAGNOSIS — G56.01 CARPAL TUNNEL SYNDROME ON RIGHT: Primary | ICD-10-CM

## 2022-02-16 PROCEDURE — 97110 THERAPEUTIC EXERCISES: CPT

## 2022-02-16 PROCEDURE — 97530 THERAPEUTIC ACTIVITIES: CPT

## 2022-02-16 PROCEDURE — 97140 MANUAL THERAPY 1/> REGIONS: CPT

## 2022-02-16 NOTE — PROGRESS NOTES
Daily Note     Today's date: 2022  Patient name: Aliyah Mirza  : 1980  MRN: [id number]  Referring provider: Annmarie Art MD  Dx:   Encounter Diagnosis     ICD-10-CM    1  Carpal tunnel syndrome on right  G56 01                   Subjective: "It has been so numb  It has been way more numb during pickleball and activities "    Objective: See treatment diary below     Precautions: Universal     Manuals HEP    Forearm Flexors IASTM  10 min   K-tape thumb spica  yes             Ther Ex     Education on HEP and dx x5 min  10 min   Median Nerve Glides X10, twice a day X 10   Forearm Flexor/Extensor Stretch 3x15 sec 3 x 15 sec each exercise   Ulnar Nerve Glides X 10, twice a day X 10    Digit Extension (Yellow) X 10, once a day X 10   Yellow Clip Opposition  X 10   Digit Flex Yellow   X 10        Therapeutic Activities     Towel Scrunches X 10 X 10   Towel Squeezes  X 10 X 10   Wrist Maze  X 10        Modalities     Heat   5-10 min 5 min     Therapist supervised patient with use of modalities during today's treatment session  Skin integrity was assessed and appeared normal following use of modalities  Assessment:   Patient tolerated session well  Patient demonstrates soft tissue tightness, pain with function, and decreased strength upon assessment today  Continued soft tissue tightness through forearm extensors and flexors  Encouraged pt to continue stretching at home  Introduced new therapeutic exercises and activities today  Reports decreased numbness and tingling when compared to last treatment session  Plan:   Focus on decreased pain with function, AROM, strengthening to improve ability to complete daily activites with ease    [address] 2022-3/30/2022

## 2022-02-18 ENCOUNTER — HOSPITAL ENCOUNTER (OUTPATIENT)
Dept: RADIOLOGY | Facility: HOSPITAL | Age: 42
Discharge: HOME | End: 2022-02-18
Payer: COMMERCIAL

## 2022-02-18 DIAGNOSIS — G56.01 CARPAL TUNNEL SYNDROME ON RIGHT: ICD-10-CM

## 2022-02-18 DIAGNOSIS — G56.21 CUBITAL TUNNEL SYNDROME ON RIGHT: ICD-10-CM

## 2022-02-18 PROCEDURE — 76882 US LMTD JT/FCL EVL NVASC XTR: CPT

## 2022-02-21 ENCOUNTER — OFFICE VISIT (OUTPATIENT)
Dept: OCCUPATIONAL THERAPY | Facility: MEDICAL CENTER | Age: 42
End: 2022-02-21
Payer: COMMERCIAL

## 2022-02-21 DIAGNOSIS — G56.01 CARPAL TUNNEL SYNDROME ON RIGHT: Primary | ICD-10-CM

## 2022-02-21 PROCEDURE — 97140 MANUAL THERAPY 1/> REGIONS: CPT

## 2022-02-21 PROCEDURE — 97530 THERAPEUTIC ACTIVITIES: CPT

## 2022-02-21 PROCEDURE — 97110 THERAPEUTIC EXERCISES: CPT

## 2022-02-21 NOTE — PROGRESS NOTES
Daily Note     Today's date: 2022  Patient name: Jamie Brown  : 1980  MRN: [id number]  Referring provider: Loreto Gonzalez MD  Dx:   Encounter Diagnosis     ICD-10-CM    1  Carpal tunnel syndrome on right  G56 01                 Subjective: "The ulnar nerve glides have been fine " "The forearm stretches have been hurting my wrist "    Objective: See treatment diary below     Precautions: Universal     Manuals HEP    Forearm Flexors IASTM  10 min   K-tape thumb spica  yes             Ther Ex     Education on HEP and dx x5 min  10 min   Median Nerve Glides X10, twice a day X 10   Forearm Flexor/Extensor Stretch 3x15 sec    Ulnar Nerve Glides X 10, twice a day X 10    Digit Extension (Yellow) X 10, once a day X 10   Yellow Clip Opposition  X 10   Digit Flex Yellow   X 10   Thumb Presses (Yellow)  X 10        Therapeutic Activities     Towel Scrunches X 10 X 10   Towel Squeezes  X 10 X 10   Wrist Maze  X 10   Palm to Fingertip Translation of Marbles   X 10        Modalities     Heat   5-10 min 5 min     Therapist supervised patient with use of modalities during today's treatment session  Skin integrity was assessed and appeared normal following use of modalities  Assessment:   Patient tolerated session well  Patient demonstrates soft tissue tightness, pain with function, and decreased strength upon assessment today  Continued soft tissue tightness through forearm extensors and flexors  Continued reports of numbness/tingling  Received US, however, pt is waiting for results from referring provider  Introduced new therapeutic exercises and activities today, however, unable to progress due to numbness/tingling  Plan:   Focus on decreased pain with function, AROM, strengthening to improve ability to complete daily activites with ease    [address] [facility] [facility] 2022-3/30/2022

## 2022-02-28 ENCOUNTER — OFFICE VISIT (OUTPATIENT)
Dept: OCCUPATIONAL THERAPY | Facility: MEDICAL CENTER | Age: 42
End: 2022-02-28
Payer: COMMERCIAL

## 2022-02-28 DIAGNOSIS — G56.01 CARPAL TUNNEL SYNDROME ON RIGHT: Primary | ICD-10-CM

## 2022-02-28 DIAGNOSIS — G56.21 CUBITAL TUNNEL SYNDROME ON RIGHT: ICD-10-CM

## 2022-02-28 PROCEDURE — 97530 THERAPEUTIC ACTIVITIES: CPT

## 2022-02-28 PROCEDURE — 97110 THERAPEUTIC EXERCISES: CPT

## 2022-02-28 PROCEDURE — 97140 MANUAL THERAPY 1/> REGIONS: CPT

## 2022-02-28 NOTE — PROGRESS NOTES
Daily Note   f  Today's date: 2022  Patient name: Gregory Alonzo  : 1980  MRN: [id number]  Referring provider: Cindy Murray MD  Dx:   Encounter Diagnosis     ICD-10-CM    1  Carpal tunnel syndrome on right  G56 01    2  Cubital tunnel syndrome on right  G56 21                 Subjective: "The pain is really bad lately  I am having pain in the middle of my wrist  The numbness and tingling has been worse in the these two fingers (referring to D4&D5)"     Objective: See treatment diary below     Precautions: Universal     Manuals HEP    Forearm Flexors IASTM  10 min   K-tape thumb spica  yes             Ther Ex     Education on HEP and dx x5 min  10 min   Median Nerve Glides X10, twice a day X 10   Forearm Flexor/Extensor Stretch 3x15 sec    Ulnar Nerve Glides X 10, twice a day X 10    Digit Extension (Yellow) X 10, once a day X 10   Yellow Clip Opposition  X 10   Digit Flex Yellow   X 10   Thumb Presses (Yellow)  X 10   Yellow Flex Bar  X 10 Pronation, x 10 Supination        Therapeutic Activities     Towel Scrunches X 10 X 10   Towel Squeezes  X 10 X 10   Wrist Maze  X 10   Palm to Fingertip Translation of Marbles           Modalities     Heat   5-10 min 5 min     Therapist supervised patient with use of modalities during today's treatment session  Skin integrity was assessed and appeared normal following use of modalities  Assessment:   Patient tolerated session well  Patient demonstrates soft tissue tightness, pain with function, and decreased strength upon assessment today  Pt reports that she has not made much improvement since beginning therapy  Continued soft tissue tightness through forearm extensors and flexors  Continued reports of numbness/tingling  Mild progression, limited by symptoms  Received US, however, pt is waiting for results from referring provider on 3/10       Plan:   Focus on decreased pain with function, AROM, strengthening to improve ability to complete daily activites with ease   POC 2/2/2022-3/30/2022
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
## 2022-03-07 ENCOUNTER — OFFICE VISIT (OUTPATIENT)
Dept: OCCUPATIONAL THERAPY | Facility: MEDICAL CENTER | Age: 42
End: 2022-03-07
Payer: COMMERCIAL

## 2022-03-07 DIAGNOSIS — G56.01 CARPAL TUNNEL SYNDROME ON RIGHT: Primary | ICD-10-CM

## 2022-03-07 DIAGNOSIS — G56.21 CUBITAL TUNNEL SYNDROME ON RIGHT: ICD-10-CM

## 2022-03-07 PROCEDURE — 97110 THERAPEUTIC EXERCISES: CPT

## 2022-03-07 PROCEDURE — 97140 MANUAL THERAPY 1/> REGIONS: CPT

## 2022-03-07 PROCEDURE — 97530 THERAPEUTIC ACTIVITIES: CPT

## 2022-03-10 ENCOUNTER — OFFICE VISIT (OUTPATIENT)
Dept: OBGYN CLINIC | Facility: CLINIC | Age: 42
End: 2022-03-10
Payer: COMMERCIAL

## 2022-03-10 VITALS
SYSTOLIC BLOOD PRESSURE: 119 MMHG | WEIGHT: 205 LBS | DIASTOLIC BLOOD PRESSURE: 80 MMHG | HEIGHT: 63 IN | BODY MASS INDEX: 36.32 KG/M2 | RESPIRATION RATE: 17 BRPM | HEART RATE: 73 BPM

## 2022-03-10 DIAGNOSIS — G56.01 CARPAL TUNNEL SYNDROME OF RIGHT WRIST: ICD-10-CM

## 2022-03-10 DIAGNOSIS — G56.21 ULNAR NEURITIS, RIGHT: Primary | ICD-10-CM

## 2022-03-10 DIAGNOSIS — M77.8 RIGHT WRIST TENDINITIS: ICD-10-CM

## 2022-03-10 PROCEDURE — 3008F BODY MASS INDEX DOCD: CPT | Performed by: SURGERY

## 2022-03-10 PROCEDURE — 99213 OFFICE O/P EST LOW 20 MIN: CPT | Performed by: SURGERY

## 2022-03-10 PROCEDURE — 1036F TOBACCO NON-USER: CPT | Performed by: SURGERY
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
## 2022-03-14 ENCOUNTER — EVALUATION (OUTPATIENT)
Dept: OCCUPATIONAL THERAPY | Facility: MEDICAL CENTER | Age: 42
End: 2022-03-14
Payer: COMMERCIAL

## 2022-03-14 DIAGNOSIS — M77.8 RIGHT WRIST TENDONITIS: ICD-10-CM

## 2022-03-14 DIAGNOSIS — G56.01 CARPAL TUNNEL SYNDROME ON RIGHT: Primary | ICD-10-CM

## 2022-03-14 PROCEDURE — 97140 MANUAL THERAPY 1/> REGIONS: CPT

## 2022-03-14 PROCEDURE — 97110 THERAPEUTIC EXERCISES: CPT

## 2022-03-14 NOTE — PROGRESS NOTES
OT Re-Evaluation     Today's date: 3/14/2022  Patient name: Erica Vallejo  : 1980  MRN: [id number]  Referring provider: Sudheer Cochran MD  Dx:   Encounter Diagnosis     ICD-10-CM    1  Carpal tunnel syndrome on right  G56 01                   Assessment  Assessment details: Pt presents to OT evaluation on 3/4 to reassess POC and update therapeutic goals  New OT referral placed to include diagnoses of carpal tunnel on R and R wrist tendonitis  Pt exhibited decreased  and pinch strength when compared to initial evaluation  Pain with palpation at ulnar styloid and flexor tendon of D5  Mild edema through wrist present  Pt was educated on HEP to include radial/ulnar deviation AROM and PROM, forearm flexor/extensor stretch, pronation/supination stretch with lever arm (hammer), and median nerve glides  Pt demonstrated understanding for new HEP  Pt will be seen 2x/wk for 6-8 weeks to address above deficits  POC was reviewed with the pt, pt demonstrated understanding  Impairments: abnormal or restricted ROM, activity intolerance, impaired physical strength, lacks appropriate home exercise program and pain with function    Goals  STG:  - Pt will exhibit understanding and demonstrate carry over of HEP - MET  - Pt will begin to verbally report a decrease in pain from time of initial evaluation with use of modalities  - [facility]  -Pt will demonstrate compliance with brace wearing schedule  - PROGRESSING    LTG:  - Pt will exhibit decreased soft tissue tightness through R forearm flexors for improved participation in ADL's and leisure tasks  - PROGRESSING  - Pt will increase by R  strength when compared to initial evaluation for improved participation in ADL's and leisure tasks   - PROGRESSING  - Pt will consistently report decreased pain when compared to initial evaluation  - NOT MET  - Pt will report feeling >75% back to normal for increased independence in daily routine and tasks  - NOT MET  - Pt will express readiness for discharge with improved independence  - NOT MET      Plan  Planned modality interventions: thermotherapy: hydrocollator packs  Planned therapy interventions: massage, manual therapy, patient education, neuromuscular re-education, therapeutic activities, therapeutic exercise, stretching, strengthening, functional ROM exercises, home exercise program and compression  Frequency: 2x week  Duration in weeks: 12  Plan of Care beginning date: 3/14/2022  Plan of Care expiration date: 2022  Treatment plan discussed with: patient        Subjective Evaluation    History of Present Illness  Mechanism of injury: Pt presents to OT re-evaluation on 3/14 secondary to R carpal tunnel and R wrist tendonitis  Pt recently had imaging that showed positive carpal tunnel syndrome and right wrist tendonitis  Pt reported that doctor recommended continued therapy prior to discussing/scheduling carpal tunnel surgery  Continued numbness/tingling and pain with daily tasks  OT Profile:  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  Leisure: Wants to play pickleball and go to the gym ([employer]), but is limited by pain    Pain  Current pain ratin  At best pain ratin  At worst pain ratin  Relieving factors: ice and support    Patient Goals  Patient goals for therapy: increased strength, decreased edema, increased motion, independence with ADLs/IADLs, decreased pain and return to sport/leisure activities  Patient goal: "I want to be able to play pickleball and go to the gym "        Objective     Active Range of Motion     Left Elbow   Flexion: WFL  Extension: WFL  Forearm supination: [facility]  Forearm pronation: [facility]    Right Elbow   Flexion: WFL  Extension: WFL  Forearm supination: [facility]  Forearm pronation: [facility]    Left Wrist   Wrist flexion: 65 degrees WFL  Wrist extension: 65 degrees   Radial deviation: 28 degrees WFL  Ulnar deviation: 30 degrees WFL      Right Wrist   Wrist flexion: 65 degrees WFL  Wrist extension: 55 degrees   Radial deviation: 17 degrees   Ulnar deviation: 13 degrees     Left Thumb   Opposition: Full Fist and Digit Opposition    Right Thumb   Opposition: Full fist and Digit Opposition    Strength/Myotome Testing     Left Wrist/Hand      (2nd hand position)     Trial 1: 42    Thumb Strength  Key/Lateral Pinch     Trial 1: 17  Tip/Two-Point Pinch     Trial 1: 12  Palmar/Three-Point Pinch     Trial 1: 17    Right Wrist/Hand      (2nd hand position)     Trial 1: 30    Thumb Strength   Key/Lateral Pinch     Trial 1: 9  Tip/Two-Point Pinch     Trial 1: 9  Palmar/Three-Point Pinch     Trial 1: 10    Tests     Right Elbow   Positive Tinel's sign (cubital tunnel)  Right Wrist/Hand   Positive Tinel's sign (medial nerve)  Swelling     Left Wrist/Hand   Circumference wrist: 16 9 cm    Right Wrist/Hand   Circumference wrist: 17 2 cm       Evaluate and Treat right hand/wrist  Flexor tendon tendinitis exercises of the small/ring fingers, TFCC tendinitis exercises, FCR tendinis exercises, median nerve glides  PROM ulnar/radial deviation  Flex bar resistance PROM sup/pron  Towel Squeezes       Precautions: Universal        Manuals HEP    Forearm Flexors IASTM  10 min   K-tape  Yes             Ther Ex     Education on HEP and dx x5 min  10 min   Median Nerve Glides X10, twice a day X 10   Forearm Flexor/Extensor Stretch 3x15 sec 3 x 15 sec each exercise   Radial/Ulnar Deviation AROM X 10 X 10   Radial/Ulnar Deviation PROM X 10 with 5 sec hold X 10 with 5 sec hold   Pronation/Supination with Flexbar Level Arm X 10 with 5 sec hold X 10 with 5 sec hold                                      Modalities     Heat 5-10 min 5 min          Therapist supervised patient with use of modalities during today's treatment session  Skin integrity was assessed and appeared normal following use of modalities  Assessment:   Pt presents to OT evaluation on 3/4 to reassess POC and update therapeutic goals   New OT referral placed to include diagnoses of carpal tunnel on R and R wrist tendonitis  Pt exhibited decreased  and pinch strength when compared to initial evaluation  Pain with palpation at ulnar styloid and flexor tendon of D5  Mild edema through wrist present  Pt was educated on HEP to include radial/ulnar deviation AROM and PROM, forearm flexor/extensor stretch, pronation/supination stretch with lever arm (hammer), and median nerve glides  Pt demonstrated understanding for new HEP  Pt will be seen 2x/wk for 6-8 weeks to address above deficits  POC was reviewed with the pt, pt demonstrated understanding  Plan:   Focus on decreased pain with function, AROM, strengthening to improve ability to complete daily activites with ease    POC 3/14/2022-6/13/2022

## 2022-03-16 ENCOUNTER — OFFICE VISIT (OUTPATIENT)
Dept: OCCUPATIONAL THERAPY | Facility: MEDICAL CENTER | Age: 42
End: 2022-03-16
Payer: COMMERCIAL

## 2022-03-16 DIAGNOSIS — G56.01 CARPAL TUNNEL SYNDROME ON RIGHT: Primary | ICD-10-CM

## 2022-03-16 PROCEDURE — 97110 THERAPEUTIC EXERCISES: CPT

## 2022-03-16 PROCEDURE — 97140 MANUAL THERAPY 1/> REGIONS: CPT

## 2022-03-16 PROCEDURE — 97530 THERAPEUTIC ACTIVITIES: CPT

## 2022-03-16 NOTE — PROGRESS NOTES
Daily Note     Today's date: 3/16/2022  Patient name: Deandre Hooker  : 1980  MRN: [id number]  Referring provider: Justa Marie MD  Dx:   Encounter Diagnosis     ICD-10-CM    1  Carpal tunnel syndrome on right  G56 01        Start Time: 1400  Stop Time: 1445  Total time in clinic (min): 45 minutes  Subjective: "The stretches are going well "    Objective: See treatment diary below     Precautions: Universal     Manuals HEP    Forearm Flexors IASTM  10 min   K-tape thumb spica  yes   Soft Strap Wrist Widget          Ther Ex     Education on HEP and dx x5 min  10 min   Median Nerve Glides X10, twice a day X 10   Forearm Flexor/Extensor Stretch 3x15 sec    Ulnar Nerve Glides X 10, twice a day X 10    Digit Extension (Yellow) X 10, once a day X 10   Yellow Clip Opposition  X 10   Red Digi Flex  X 10   Thumb Presses (Yellow)  X 10   Yellow Flex Bar  X 10 Pronation, x 10 Supination   AROM Ulnar/Radial Deviation  X 10   PROM Ulnar/Radial Deviation  3 x 10   Pronation/Supination Stretch with FlexBar Lever Arm  3 x 10                  Therapeutic Activities     Towel Scrunches X 10 X 10   Towel Squeezes  X 10 X 10   Wrist Maze  X 10   Palm to Fingertip Translation of Marbles           Modalities     Heat   5-10 min 5 min     Therapist supervised patient with use of modalities during today's treatment session  Skin integrity was assessed and appeared normal following use of modalities  Assessment:   Patient tolerated session well  Pt reports improvement with pain with updated HEP to include wrist stretching due to tendonitis  Therapist made wrist widget with soft straps and provided to pt to trial for wrist stability  Decreased soft tissue tightness exhibited through forearm flexors today  Good tolerance to all stretches and therapeutic exercises today  Will continue to progress pt as tolerated      Plan:   Focus on decreased pain with function, AROM, strengthening to improve ability to complete daily activites with ease   POC 3/14/2022-6/13/2022

## 2022-03-21 ENCOUNTER — OFFICE VISIT (OUTPATIENT)
Dept: OCCUPATIONAL THERAPY | Facility: MEDICAL CENTER | Age: 42
End: 2022-03-21
Payer: COMMERCIAL

## 2022-03-21 DIAGNOSIS — G56.01 CARPAL TUNNEL SYNDROME ON RIGHT: Primary | ICD-10-CM

## 2022-03-21 DIAGNOSIS — M77.8 RIGHT WRIST TENDONITIS: ICD-10-CM

## 2022-03-21 PROCEDURE — 97110 THERAPEUTIC EXERCISES: CPT

## 2022-03-21 PROCEDURE — 97140 MANUAL THERAPY 1/> REGIONS: CPT

## 2022-03-21 NOTE — PROGRESS NOTES
Daily Note     Today's date: 3/21/2022  Patient name: Doretta Nageotte  : 1980`  MRN: [id number]  Referring provider: Guera Cueva MD  Dx:   Encounter Diagnosis     ICD-10-CM    1  Carpal tunnel syndrome on right  G56 01    2  Right wrist tendonitis  M77 8                 Subjective: "I feel a little better  Maybe it's the wrist brace you made me "    Objective: See treatment diary below     Precautions: Universal     Manuals HEP    Forearm Flexors IASTM  10 min   K-tape thumb spica  yes   Soft Strap Wrist Widget          Ther Ex     Education on HEP and dx x5 min  10 min   Median Nerve Glides X10, twice a day X 10   Forearm Flexor/Extensor Stretch 3x15 sec    Ulnar Nerve Glides X 10, twice a day X 10    Digit Extension (Yellow) X 10, once a day X 10   Yellow Clip Opposition  X 10   Red Digi Flex  X 10   Thumb Presses (Yellow)  X 10   Yellow Flex Bar  X 10 Pronation, x 10 Supination   AROM Ulnar/Radial Deviation  X 10   PROM Ulnar/Radial Deviation  3 x 10   Pronation/Supination Stretch with FlexBar Lever Arm  3 x 10   Wrist Flexion #1  X 10   Wrist Extension #1  X 10        Therapeutic Activities     Towel Scrunches X 10 X 10   Towel Squeezes  X 10    Wrist Maze  X 10   Palm to Fingertip Translation of Marbles           Modalities     Heat   5-10 min 5 min     Therapist supervised patient with use of modalities during today's treatment session  Skin integrity was assessed and appeared normal following use of modalities  Assessment:   Patient tolerated session well  Progressed pt with therapeutic exercises and activities today without reports of pain  Continues to be compliant with HEP  Decreased soft tissue tightness exhibited through forearm flexors today  Will continue to progress pt as tolerated  Plan:   Focus on decreased pain with function, AROM, strengthening to improve ability to complete daily activites with ease   POC 3/14/2022-2022

## 2022-03-23 ENCOUNTER — OFFICE VISIT (OUTPATIENT)
Dept: OCCUPATIONAL THERAPY | Facility: MEDICAL CENTER | Age: 42
End: 2022-03-23
Payer: COMMERCIAL

## 2022-03-23 DIAGNOSIS — M77.8 RIGHT WRIST TENDONITIS: ICD-10-CM

## 2022-03-23 DIAGNOSIS — G56.01 CARPAL TUNNEL SYNDROME ON RIGHT: Primary | ICD-10-CM

## 2022-03-23 PROCEDURE — 97110 THERAPEUTIC EXERCISES: CPT

## 2022-03-23 PROCEDURE — 97530 THERAPEUTIC ACTIVITIES: CPT

## 2022-03-23 PROCEDURE — 97140 MANUAL THERAPY 1/> REGIONS: CPT

## 2022-03-23 NOTE — PROGRESS NOTES
Daily Note     Today's date: 3/23/2022  Patient name: Hetal Doran  : 1980`  MRN: [id number]  Referring provider: Jurgen Lovelace MD  Dx:   Encounter Diagnosis     ICD-10-CM    1  Carpal tunnel syndrome on right  G56 01    2  Right wrist tendonitis  M77 8                 Subjective: "I have had more pain in my pinky the last few days "    Objective: See treatment diary below     Precautions: Universal     Manuals HEP    Forearm Flexors IASTM  10 min   K-tape thumb spica  yes   Soft Strap Wrist Widget          Ther Ex     Education on HEP and dx x5 min  10 min   Median Nerve Glides X10, twice a day X 10   Forearm Flexor/Extensor Stretch 3x15 sec    Ulnar Nerve Glides X 10, twice a day X 10    Digit Extension (Red) X 10, once a day X 10   Red Clip Opposition     Red Digi Flex  X 10   Thumb Presses (Yellow)     Yellow Flex Bar     AROM Ulnar/Radial Deviation  X 10   PROM Ulnar/Radial Deviation  3 x 10   Pronation/Supination Stretch with FlexBar Lever Arm  5 x 5 sec hold each way   Wrist Flexion #2  X 10   Wrist Extension #2  X 10        Therapeutic Activities     Towel Scrunches X 10 X 10   Towel Squeezes  X 10 X 10   Wrist Maze     Palm to Fingertip Translation of Marbles           Modalities     Heat   5-10 min 5 min     Therapist supervised patient with use of modalities during today's treatment session  Skin integrity was assessed and appeared normal following use of modalities  Assessment:   Patient tolerated session well  Progressed therapeutic exercises today with increased resistance and repetitions, tolerated well  Pt reported pain in D5 with PROM and digiflex exercise  Encouraged pt to continue with PROM ulnar/radial deviation for tendonitis and pain in D5  Continues to be compliant with HEP  Will continue to progress pt as tolerated  Plan:   Focus on decreased pain with function, AROM, strengthening to improve ability to complete daily activites with ease   POC 3/14/2022-2022

## 2022-03-28 ENCOUNTER — OFFICE VISIT (OUTPATIENT)
Dept: OCCUPATIONAL THERAPY | Facility: MEDICAL CENTER | Age: 42
End: 2022-03-28
Payer: COMMERCIAL

## 2022-03-28 DIAGNOSIS — G56.01 CARPAL TUNNEL SYNDROME ON RIGHT: Primary | ICD-10-CM

## 2022-03-28 PROCEDURE — 97110 THERAPEUTIC EXERCISES: CPT

## 2022-03-28 PROCEDURE — 97530 THERAPEUTIC ACTIVITIES: CPT

## 2022-03-28 PROCEDURE — 97140 MANUAL THERAPY 1/> REGIONS: CPT

## 2022-03-28 NOTE — PROGRESS NOTES
Daily Note     Today's date: 3/28/2022  Patient name: Juancho Mcdonald  : 1980`  MRN: [id number]  Referring provider: Mildred Muse MD  Dx:   Encounter Diagnosis     ICD-10-CM    1  Carpal tunnel syndrome on right  G56 01                 Subjective: "My pinky is still hurting "    Objective: See treatment diary below     Precautions: Universal     Manuals HEP    Forearm Flexors IASTM  10 min   K-tape thumb spica  yes   Soft Strap Wrist Widget          Ther Ex     Education on HEP and dx x5 min  10 min   Median Nerve Glides X10, twice a day X 10   Forearm Flexor/Extensor Stretch 3x15 sec    Ulnar Nerve Glides X 10, twice a day X 10    Digit Extension (Red) X 10, once a day X 10   Red Clip Opposition     Red Digi Flex  X 10   Thumb Presses (Yellow)     Yellow Flex Bar  X 10    AROM Ulnar/Radial Deviation  X 10   PROM Ulnar/Radial Deviation  3 x 10   Pronation/Supination Stretch with FlexBar Lever Arm  5 x 5 sec hold each way   Wrist Flexion #2  X 10   Wrist Extension #2  X 10        Therapeutic Activities     Towel Scrunches X 10 X 10   Towel Squeezes  X 10 X 10   Wrist Maze     Palm to Fingertip Translation of Marbles           Modalities     Heat   5-10 min 5 min     Therapist supervised patient with use of modalities during today's treatment session  Skin integrity was assessed and appeared normal following use of modalities  Assessment:   Patient tolerated session well  Pt continues to exhibit soft tissue tightness through forearm flexors and extensors on RUE  Encouraged pt to continue prolonged stretching with HEP  Continued with TE and TA today, no progression due to muscle tightness and pain in D5  Will continue to progress pt as tolerated  Plan:   Focus on decreased pain with function, AROM, strengthening to improve ability to complete daily activites with ease   POC 3/14/2022-2022

## 2022-03-30 ENCOUNTER — APPOINTMENT (OUTPATIENT)
Dept: OCCUPATIONAL THERAPY | Facility: MEDICAL CENTER | Age: 42
End: 2022-03-30
Payer: COMMERCIAL

## 2022-04-01 ENCOUNTER — OFFICE VISIT (OUTPATIENT)
Dept: OCCUPATIONAL THERAPY | Facility: MEDICAL CENTER | Age: 42
End: 2022-04-01
Payer: COMMERCIAL

## 2022-04-01 DIAGNOSIS — G56.01 CARPAL TUNNEL SYNDROME ON RIGHT: Primary | ICD-10-CM

## 2022-04-01 PROCEDURE — 97140 MANUAL THERAPY 1/> REGIONS: CPT

## 2022-04-01 PROCEDURE — 97110 THERAPEUTIC EXERCISES: CPT

## 2022-04-01 NOTE — PROGRESS NOTES
Daily Note     Today's date: 2022  Patient name: Altagracia Florian  : 1980`  MRN: [id number]  Referring provider: Nakia Rodgers MD  Dx:   Encounter Diagnosis     ICD-10-CM    1  Carpal tunnel syndrome on right  G56 01                 Subjective: "It's about the same  My pinky and ring finger are still hurting "  Pt f/u with specialist on   Pain level 6/10 at start of tx session  Pt arrived x 5 min late to tx session today  Objective: See treatment diary below     Precautions: Universal     Manuals HEP 4   Forearm Flexors IASTM  15 min   K-tape thumb spica  yes   Soft Strap Wrist Widget     Prolonged Passive Stretch  5 min   Ther Ex     Education on HEP and dx X 5 min     Median Nerve Glides X10, twice a day X 10   Forearm Flexor/Extensor Stretch 3 x 15 sec    Ulnar Nerve Glides X 10, twice a day    Digit Extension (Red) X 10, once a day    Red Clip Opposition     Red Digi Flex     Thumb Presses (Yellow)     Yellow Flex Bar  X 20   AROM Ulnar/Radial Deviation     PROM Ulnar/Radial Deviation     Pronation/Supination Stretch with FlexBar Lever Arm  5 x 5 sec hold each way   Wrist Flexion #2  X 20   Wrist Extension #2  X 20         Therapeutic Activities     Towel Scrunches X 10    Towel Squeezes  X 10    Wrist Maze     Palm to Fingertip Translation of Marbles           Modalities     Heat   5-10 min 5 min     Therapist supervised patient with use of modalities during today's treatment session  Skin integrity was assessed and appeared normal following use of modalities  Assessment:   Patient tolerated session well  Pt reports stretching has improved condition, along with K-taping of thumb/wrist   Tx session focused on decreasing continued pain felt along ulnar side of distal RUE  Pt responded well to extended massage and prolonged passive stretch on this date and was able to complete thera ex with no observable adverse effects noted or reported    Will continue to progress pt as tolerated  Plan:   Focus on decreased pain with function, AROM, strengthening to improve ability to complete daily activites with ease   POC 3/14/2022-6/13/2022

## 2022-04-04 ENCOUNTER — OFFICE VISIT (OUTPATIENT)
Dept: OCCUPATIONAL THERAPY | Facility: MEDICAL CENTER | Age: 42
End: 2022-04-04
Payer: COMMERCIAL

## 2022-04-04 DIAGNOSIS — G56.01 CARPAL TUNNEL SYNDROME ON RIGHT: Primary | ICD-10-CM

## 2022-04-04 DIAGNOSIS — M77.8 RIGHT WRIST TENDONITIS: ICD-10-CM

## 2022-04-04 PROCEDURE — 97140 MANUAL THERAPY 1/> REGIONS: CPT

## 2022-04-04 PROCEDURE — 97110 THERAPEUTIC EXERCISES: CPT

## 2022-04-04 PROCEDURE — 97530 THERAPEUTIC ACTIVITIES: CPT

## 2022-04-04 NOTE — PROGRESS NOTES
Daily Note     Today's date: 2022  Patient name: Bhakti Borges  : 1980`  MRN: [id number]  Referring provider: Zayda Enriquez MD  Dx:   Encounter Diagnosis     ICD-10-CM    1  Carpal tunnel syndrome on right  G56 01    2  Right wrist tendonitis  M77 8                 Subjective: "Not much has changed  My pinky still hurts "    Objective: See treatment diary below     Precautions: Universal     Manuals HEP    Forearm Flexors IASTM  15 min   K-tape wrist widget  yes   Soft Strap Wrist Widget     Prolonged Passive Stretch  5 min   Ther Ex     Education on HEP and dx X 5 min     Median Nerve Glides X10, twice a day X 10   Forearm Flexor/Extensor Stretch 3 x 15 sec 3 x 20 second hold   Ulnar Nerve Glides X 10, twice a day    Digit Extension (Red) X 10, once a day    Red Clip Opposition     Red Digi Flex     Thumb Presses (Yellow)     Tendon Glides  3 x 10 X 10    Yellow Flex Bar  X 20   AROM Ulnar/Radial Deviation     PROM Ulnar/Radial Deviation  3 x 20 second hold   Pronation/Supination Stretch with FlexBar Lever Arm  3 x 20 second hold supination/pronation   Wrist Flexion #2     Wrist Extension #2          Therapeutic Activities     Towel Scrunches X 10    Towel Squeezes  X 10    Wrist Maze  X 5 on R   Palm to Fingertip Translation of Marbles           Modalities     Heat   5-10 min 5 min     Therapist supervised patient with use of modalities during today's treatment session  Skin integrity was assessed and appeared normal following use of modalities  Assessment:   Patient tolerated session well  Pt continues to report pain through D5  Increased time of stretching today, tolerated well  Educated on and provided with HEP of tendon glides due to reported "stuck" feeling at D5  Pt demonstrated understanding for exercise  Will continue to progress pt as tolerated  Plan:   Focus on decreased pain with function, AROM, strengthening to improve ability to complete daily activites with ease   POC 3/14/2022-6/13/2022

## 2022-04-05 NOTE — PROGRESS NOTES
OT Discharge     Today's date: 2022  Patient name: Joseph Sandoval  : 1980  MRN: [id number]  Referring provider: Leonardo Milligan MD  Dx:   Encounter Diagnosis     ICD-10-CM    1  Carpal tunnel syndrome on right  G56 01    2  Right wrist tendonitis  M77 8                   Assessment  Assessment details: Pt presents to OT discharge on 2022 secondary to cubital tunnel and carpal tunnel syndrome  Pt reports that symptoms of pain and numbness continue through D1-D5 with normal routine  Pt has reached a therapeutic plateau  Therapist and pt discussed insurance restrictions for therapy (20 visits a year)  Therapist recommends pt be discharged due to therapeutic plateau and potential need for continued therapy services with continued deficits  Pt exhibited maintained ROM and /pinch strength with no significant improvements noted  Pt will be discharged from OT services at this time due to therapeutic plateau  Reviewed HEP with the pt, pt demonstrated understanding for all exercises  POC was reviewed with the pt, pt demonstrated understanding  Impairments: abnormal or restricted ROM, activity intolerance, impaired physical strength, lacks appropriate home exercise program and pain with function    Goals  STG:  - Pt will exhibit understanding and demonstrate carry over of HEP - MET  - Pt will begin to verbally report a decrease in pain from time of initial evaluation with use of modalities  - PROGRESSING  -Pt will demonstrate compliance with brace wearing schedule  - PROGRESSING    LTG:  - Pt will exhibit decreased soft tissue tightness through R forearm flexors for improved participation in ADL's and leisure tasks  - PROGRESSING  - Pt will increase by R  strength when compared to initial evaluation for improved participation in ADL's and leisure tasks   - PROGRESSING  - Pt will consistently report decreased pain when compared to initial evaluation  - NOT MET  - Pt will report feeling >75% back to normal for increased independence in daily routine and tasks  - NOT MET  - Pt will express readiness for discharge with improved independence  - NOT MET      Plan  Pt will be discharged from OT treatment services at this time due to therapeutic plateau and insurance restrictions  Treatment plan discussed with: patient    Subjective Evaluation    Pt reports that she is continuing to have pain at D5 with normal routine and daily tasks  Continued numbness through D1-D5 secondary to carpal tunnel and cubital tunnel syndrome that has made little improvement since beginning therapy  Mild pain relief with K-taping into wrist widget  History of Present Illness  Mechanism of injury: Pt presents to OT re-evaluation on 3/14 secondary to R carpal tunnel and R wrist tendonitis  Pt recently had imaging that showed positive carpal tunnel syndrome and right wrist tendonitis  Pt reported that doctor recommended continued therapy prior to discussing/scheduling carpal tunnel surgery  Continued numbness/tingling and pain with daily tasks  OT Profile:  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  Leisure: Wants to play pickleball and go to the gym ([employer]), but is limited by pain    Pain  Current pain ratin  At best pain ratin  At worst pain ratin  Relieving factors: ice and support    Patient Goals  Patient goals for therapy: increased strength, decreased edema, increased motion, independence with ADLs/IADLs, decreased pain and return to sport/leisure activities  Patient goal: "I want to be able to play pickleball and go to the gym "    Objective     Active Range of Motion     Left Elbow   Flexion: WFL  Extension: WFL  Forearm supination: [facility]  Forearm pronation: [facility]    Right Elbow   Flexion: WFL  Extension: WFL  Forearm supination: [facility]  Forearm pronation: [facility]    Left Wrist   Wrist flexion: 65 degrees WFL  Wrist extension: 65 degrees   Radial deviation: 28 degrees WFL  Ulnar deviation: 30 degrees WFL      Right Wrist   Wrist flexion: 65 degrees, 62 degrees   Wrist extension: 55 degrees, 55 degrees  Radial deviation: 17 degrees, 17 degrees  Ulnar deviation: 13 degrees, 17 degrees    Left Thumb   Opposition: Full Fist and Digit Opposition    Right Thumb   Opposition: Full fist and Digit Opposition    Strength/Myotome Testing     Left Wrist/Hand      (2nd hand position)     Trial 1: 42    Thumb Strength  Key/Lateral Pinch     Trial 1: 17  Tip/Two-Point Pinch     Trial 1: 12  Palmar/Three-Point Pinch     Trial 1: 17    Right Wrist/Hand      (2nd hand position)     Trial 1: 30, 26 lbs     Thumb Strength   Key/Lateral Pinch     Trial 1: 9, 13lbs   Tip/Two-Point Pinch     Trial 1: 9, 9lbs  Palmar/Three-Point Pinch     Trial 1: 10, 10lbs    Tests     Right Elbow   Positive Tinel's sign (cubital tunnel)  Right Wrist/Hand   Positive Tinel's sign (medial nerve)  Swelling     Left Wrist/Hand   Circumference wrist: 16 9 cm    Right Wrist/Hand   Circumference wrist: 17 2 cm  Precautions: Universal      Therapist supervised patient with use of modalities during today's treatment session  Skin integrity was assessed and appeared normal following use of modalities

## 2022-04-06 ENCOUNTER — OFFICE VISIT (OUTPATIENT)
Dept: OCCUPATIONAL THERAPY | Facility: MEDICAL CENTER | Age: 42
End: 2022-04-06
Payer: COMMERCIAL

## 2022-04-06 DIAGNOSIS — M77.8 RIGHT WRIST TENDONITIS: ICD-10-CM

## 2022-04-06 DIAGNOSIS — G56.01 CARPAL TUNNEL SYNDROME ON RIGHT: Primary | ICD-10-CM

## 2022-04-06 PROCEDURE — 97110 THERAPEUTIC EXERCISES: CPT

## 2022-04-11 ENCOUNTER — APPOINTMENT (OUTPATIENT)
Dept: OCCUPATIONAL THERAPY | Facility: MEDICAL CENTER | Age: 42
End: 2022-04-11
Payer: COMMERCIAL

## 2022-04-13 ENCOUNTER — APPOINTMENT (OUTPATIENT)
Dept: OCCUPATIONAL THERAPY | Facility: MEDICAL CENTER | Age: 42
End: 2022-04-13
Payer: COMMERCIAL

## 2022-04-14 ENCOUNTER — APPOINTMENT (OUTPATIENT)
Dept: OCCUPATIONAL THERAPY | Facility: MEDICAL CENTER | Age: 42
End: 2022-04-14
Payer: COMMERCIAL

## 2022-04-21 ENCOUNTER — OFFICE VISIT (OUTPATIENT)
Dept: OBGYN CLINIC | Facility: CLINIC | Age: 42
End: 2022-04-21
Payer: COMMERCIAL

## 2022-04-21 VITALS
WEIGHT: 205 LBS | SYSTOLIC BLOOD PRESSURE: 113 MMHG | HEIGHT: 63 IN | BODY MASS INDEX: 36.32 KG/M2 | DIASTOLIC BLOOD PRESSURE: 90 MMHG | HEART RATE: 101 BPM

## 2022-04-21 DIAGNOSIS — S69.81XA TFCC (TRIANGULAR FIBROCARTILAGE COMPLEX) INJURY, RIGHT, INITIAL ENCOUNTER: ICD-10-CM

## 2022-04-21 DIAGNOSIS — G56.01 CARPAL TUNNEL SYNDROME OF RIGHT WRIST: Primary | ICD-10-CM

## 2022-04-21 DIAGNOSIS — M65.351 TRIGGER LITTLE FINGER OF RIGHT HAND: ICD-10-CM

## 2022-04-21 PROCEDURE — 99214 OFFICE O/P EST MOD 30 MIN: CPT | Performed by: SURGERY

## 2022-04-21 PROCEDURE — 20550 NJX 1 TENDON SHEATH/LIGAMENT: CPT | Performed by: SURGERY

## 2022-04-21 PROCEDURE — 3008F BODY MASS INDEX DOCD: CPT | Performed by: SURGERY

## 2022-04-21 PROCEDURE — 1036F TOBACCO NON-USER: CPT | Performed by: SURGERY

## 2022-04-21 RX ORDER — LIDOCAINE HYDROCHLORIDE 10 MG/ML
1 INJECTION, SOLUTION INFILTRATION; PERINEURAL
Status: COMPLETED | OUTPATIENT
Start: 2022-04-21 | End: 2022-04-21

## 2022-04-21 RX ORDER — DEXAMETHASONE SODIUM PHOSPHATE 100 MG/10ML
40 INJECTION INTRAMUSCULAR; INTRAVENOUS
Status: COMPLETED | OUTPATIENT
Start: 2022-04-21 | End: 2022-04-21

## 2022-04-21 RX ADMIN — DEXAMETHASONE SODIUM PHOSPHATE 40 MG: 100 INJECTION INTRAMUSCULAR; INTRAVENOUS at 13:38

## 2022-04-21 RX ADMIN — LIDOCAINE HYDROCHLORIDE 1 ML: 10 INJECTION, SOLUTION INFILTRATION; PERINEURAL at 13:38

## 2022-04-21 NOTE — PATIENT INSTRUCTIONS
Injections will take about 7 days to kick in  Numbness in the tip of the small finger is normal   Continue her night time bracing and doing the exercises   Tonight and is for tomorrow she should take some ibuprofen or Tylenol as needed due to some pain and discomfort from knee injections
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
## 2022-06-01 ENCOUNTER — OFFICE VISIT (OUTPATIENT)
Dept: OBGYN CLINIC | Facility: CLINIC | Age: 42
End: 2022-06-01
Payer: COMMERCIAL

## 2022-06-01 VITALS
BODY MASS INDEX: 35.97 KG/M2 | WEIGHT: 203 LBS | SYSTOLIC BLOOD PRESSURE: 120 MMHG | HEIGHT: 63 IN | RESPIRATION RATE: 16 BRPM | DIASTOLIC BLOOD PRESSURE: 82 MMHG | HEART RATE: 82 BPM

## 2022-06-01 DIAGNOSIS — G56.01 CARPAL TUNNEL SYNDROME OF RIGHT WRIST: Primary | ICD-10-CM

## 2022-06-01 DIAGNOSIS — M65.351 TRIGGER LITTLE FINGER OF RIGHT HAND: ICD-10-CM

## 2022-06-01 DIAGNOSIS — S69.81XA TFCC (TRIANGULAR FIBROCARTILAGE COMPLEX) INJURY, RIGHT, INITIAL ENCOUNTER: ICD-10-CM

## 2022-06-01 PROCEDURE — 99214 OFFICE O/P EST MOD 30 MIN: CPT | Performed by: SURGERY

## 2022-06-01 PROCEDURE — 20550 NJX 1 TENDON SHEATH/LIGAMENT: CPT | Performed by: SURGERY

## 2022-06-01 RX ORDER — LIDOCAINE HYDROCHLORIDE 10 MG/ML
1 INJECTION, SOLUTION INFILTRATION; PERINEURAL
Status: COMPLETED | OUTPATIENT
Start: 2022-06-01 | End: 2022-06-01

## 2022-06-01 RX ORDER — DEXAMETHASONE SODIUM PHOSPHATE 100 MG/10ML
40 INJECTION INTRAMUSCULAR; INTRAVENOUS
Status: COMPLETED | OUTPATIENT
Start: 2022-06-01 | End: 2022-06-01

## 2022-06-01 RX ORDER — CEPHALEXIN 500 MG/1
1 CAPSULE ORAL 2 TIMES DAILY
COMMUNITY
Start: 2022-05-31 | End: 2022-06-23 | Stop reason: ALTCHOICE

## 2022-06-01 RX ADMIN — DEXAMETHASONE SODIUM PHOSPHATE 40 MG: 100 INJECTION INTRAMUSCULAR; INTRAVENOUS at 10:34

## 2022-06-01 RX ADMIN — LIDOCAINE HYDROCHLORIDE 1 ML: 10 INJECTION, SOLUTION INFILTRATION; PERINEURAL at 10:34
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
## 2022-06-03 DIAGNOSIS — F41.9 ANXIETY: ICD-10-CM

## 2022-06-03 RX ORDER — ESCITALOPRAM OXALATE 10 MG/1
TABLET ORAL
Qty: 30 TABLET | Refills: 0 | Status: SHIPPED | OUTPATIENT
Start: 2022-06-03 | End: 2022-07-05

## 2022-06-23 ENCOUNTER — OFFICE VISIT (OUTPATIENT)
Dept: FAMILY MEDICINE CLINIC | Facility: CLINIC | Age: 42
End: 2022-06-23
Payer: COMMERCIAL

## 2022-06-23 VITALS
DIASTOLIC BLOOD PRESSURE: 82 MMHG | TEMPERATURE: 98.2 F | HEIGHT: 63 IN | HEART RATE: 78 BPM | BODY MASS INDEX: 36.14 KG/M2 | WEIGHT: 204 LBS | SYSTOLIC BLOOD PRESSURE: 122 MMHG

## 2022-06-23 DIAGNOSIS — J30.9 ALLERGIC RHINITIS, UNSPECIFIED SEASONALITY, UNSPECIFIED TRIGGER: Primary | ICD-10-CM

## 2022-06-23 PROCEDURE — 99213 OFFICE O/P EST LOW 20 MIN: CPT | Performed by: FAMILY MEDICINE

## 2022-06-23 PROCEDURE — 1036F TOBACCO NON-USER: CPT | Performed by: FAMILY MEDICINE

## 2022-06-23 PROCEDURE — 3008F BODY MASS INDEX DOCD: CPT | Performed by: FAMILY MEDICINE
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
## 2022-06-23 NOTE — ASSESSMENT & PLAN NOTE
Persistent  D/C loratadine and start Zyrtec 10mg qd  Continue fluticasone, 2 puff bilat qA    Refer to allergist  Niya Baptiste 1-2w if not improved - earlier if  worse

## 2022-07-03 DIAGNOSIS — F41.9 ANXIETY: ICD-10-CM

## 2022-07-05 RX ORDER — ESCITALOPRAM OXALATE 10 MG/1
TABLET ORAL
Qty: 90 TABLET | Refills: 1 | Status: SHIPPED | OUTPATIENT
Start: 2022-07-05

## 2022-08-03 ENCOUNTER — OFFICE VISIT (OUTPATIENT)
Dept: OBGYN CLINIC | Facility: CLINIC | Age: 42
End: 2022-08-03
Payer: COMMERCIAL

## 2022-08-03 VITALS
HEART RATE: 76 BPM | SYSTOLIC BLOOD PRESSURE: 111 MMHG | DIASTOLIC BLOOD PRESSURE: 78 MMHG | WEIGHT: 205 LBS | BODY MASS INDEX: 36.32 KG/M2 | HEIGHT: 63 IN

## 2022-08-03 DIAGNOSIS — G56.01 CARPAL TUNNEL SYNDROME OF RIGHT WRIST: Primary | ICD-10-CM

## 2022-08-03 DIAGNOSIS — M65.351 TRIGGER LITTLE FINGER OF RIGHT HAND: ICD-10-CM

## 2022-08-03 PROCEDURE — 99215 OFFICE O/P EST HI 40 MIN: CPT | Performed by: SURGERY

## 2022-08-03 RX ORDER — CHLORHEXIDINE GLUCONATE 0.12 MG/ML
15 RINSE ORAL ONCE
OUTPATIENT
Start: 2022-08-03 | End: 2022-08-03

## 2022-08-03 NOTE — PATIENT INSTRUCTIONS
What to Expect After Hand Surgery  Below are typical postoperative expectations and recommendations for our patients having hand/elbow surgery  Please understand, however, that every case and every patient are different so these recommendations are subject to change on an individual basis  Please be flexible if you are told something different on the day of surgery and understand that we do so with your best interest at heart  Postoperative care:   Elevate your hand above your elbow for 24-48 hours after surgery to help with swelling  Place your hand and arm over your head with motion at your shoulder three times a day  Do NOT apply any cream/ointment/oil to your incisions including antibiotic ointment, peroxide, etc    Do NOT soak your hands in standing water (dishwater, tubs, Jacuzzi's, pools, etc ) until given permission (typically 2-3 weeks after injury)   What to watch out for:   Increased numbness or tingling of your hand or fingers that is not relieved with elevation  Increasing pain that is not controlled with medication  Difficulty chewing, breathing, swallowing  Chest pains or shortness of breath  Fever over 101 4 degrees  Bandages:   Please keep bandages clean and dry, you will need to cover bandages with plastic bag or cast bag when showering  Any sutures will be removed in the office, please do not try and remove these on your own  Any steri-strips will fall off on their own or we will remove them in the office as well  For smaller procedures (carpal tunnel, trigger fingers, deQuervain's release, etc ) you will be able to remove the bandages 5-7 days from surgery  Once the bandages are removed you will be able to wash the hand and take short showers  Avoid soaking the wounds in any standing water (no dirty dishes, no pools/baths/hot tubs/ocean water, etc) until you are seen at your follow up visit     For fractures, tendon repairs, and other larger procedures we will typically have you keep the bandages on until we see you back in the office at your follow up visit  In some cases we may have you meet with occupational therapy before we see you back  If this is the case the therapist will remove your bandages for you and the above wound care instructions will apply  We emphasize that you do not put anything on the surgical wounds including neosporin, lotions, oils, peroxide, etc  These can delay wound healing and open you up to infections  Bandaids are okay in some cases, but should only be in place for a short time as they can hold moisture in and break down the wound  Motion and activity:   We encourage you to move any non-splinted fingers into a full tight fist as soon as possible after surgery unless otherwise specified by the surgical team  Hands get very stiff very quickly, so keep them moving! Weight bearing status will depend on your surgery and will be specified in your postoperative instructions  Frequently we advise no lifting over 1-2 pounds with the operative hand, this allows you to perform activities of daily living (eating, brushing teeth/hair, etc), but also protects you from damaging the surgical site  In some cases we advise that you do not lift anything with the operative hand, but this will be specified in your instructions day of surgery  Depending on your job and what surgery you had done some patients can return to work shortly after surgery  Typically if your job involves heavy lifting, griping, or manual labor we advise that you do not work until we see you back for your first follow up visit  These jobs carry a high risk of surgical site infections and wound healing complications  Sling:   Use of a sling will be specified in your postoperative instructions  If you have a block done by anesthesia before surgery you will have limited use of the operative arm for around 24-48 hrs after and may require a sling to hold your arm up during that time   Once the block wears off you may discontinue use of the sling  Some surgeries do not require a block or a sling at all, this will be specified on day of surgery and in your postoperative instructions  Pain medication: We will prescribe you a one-time script of narcotic pain medications for postoperative pain, the amount will depend on what surgery was done  In addition to this we typically recommend Tylenol and Ibuprofen/naproxen for pain control, these medications work best when alternated every 3-4 hours  Medications will vary between patients, so if you have any allergies or concerns please let us know and we can adjust our recommendations as needed  Please let us know if you already take narcotic pain medicine regularly or if you are already established with pain management  Often times in these cases you may have signed a pain agreement with the prescribing provider and we will need to discuss with them regarding your postoperative care  Follow-up:   Most follow up appointments are made when you schedule surgery, if you do not have a follow up by the time you schedule surgery please call the office to make an appointment  Typical follow up is 10-14 days from surgery unless otherwise specified
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
## 2022-11-03 ENCOUNTER — TELEPHONE (OUTPATIENT)
Dept: OBGYN CLINIC | Facility: HOSPITAL | Age: 42
End: 2022-11-03

## 2022-11-03 NOTE — TELEPHONE ENCOUNTER
Caller: patient    Doctor: Wilver Ding    Reason for call: Patient needs to cancel 11/8 surgery with Dr Wilver Ding and reschedule      Call back#: [phone]

## 2023-01-07 DIAGNOSIS — F41.9 ANXIETY: ICD-10-CM

## 2023-01-08 RX ORDER — ESCITALOPRAM OXALATE 10 MG/1
TABLET ORAL
Qty: 30 TABLET | Refills: 5 | Status: SHIPPED | OUTPATIENT
Start: 2023-01-08

## 2023-02-02 DIAGNOSIS — F41.9 ANXIETY: ICD-10-CM

## 2023-02-02 RX ORDER — ESCITALOPRAM OXALATE 10 MG/1
TABLET ORAL
Qty: 90 TABLET | Refills: 2 | Status: SHIPPED | OUTPATIENT
Start: 2023-02-02

## 2023-07-17 ENCOUNTER — TELEPHONE (OUTPATIENT)
Dept: FAMILY MEDICINE CLINIC | Facility: CLINIC | Age: 43
End: 2023-07-17

## 2023-07-17 NOTE — TELEPHONE ENCOUNTER
Patient left message that she was in [place] and that she forgot med at home. She needed med sent to [place]. Called patient, she was able to have [facility] transfer medication. Will call if needs anything else.

## 2023-08-23 ENCOUNTER — TELEPHONE (OUTPATIENT)
Age: 43
End: 2023-08-23

## 2023-08-23 NOTE — TELEPHONE ENCOUNTER
S/w pt, states 'it is not lining up with my schedule.' pt will call back if she would like to reschedule.

## 2023-12-03 DIAGNOSIS — F41.9 ANXIETY: ICD-10-CM

## 2023-12-04 RX ORDER — ESCITALOPRAM OXALATE 10 MG/1
TABLET ORAL
Qty: 30 TABLET | Refills: 0 | Status: SHIPPED | OUTPATIENT
Start: 2023-12-04

## 2023-12-04 NOTE — TELEPHONE ENCOUNTER
Patient needs an appointment. Please contact the patient to schedule an appointment. Courtesy refill provided. yes

## 2024-02-20 ENCOUNTER — OFFICE VISIT (OUTPATIENT)
Dept: PODIATRY | Facility: CLINIC | Age: 44
End: 2024-02-20
Payer: COMMERCIAL

## 2024-02-20 VITALS
SYSTOLIC BLOOD PRESSURE: 129 MMHG | OXYGEN SATURATION: 98 % | BODY MASS INDEX: 36.32 KG/M2 | DIASTOLIC BLOOD PRESSURE: 80 MMHG | HEIGHT: 63 IN | WEIGHT: 205 LBS | HEART RATE: 80 BPM

## 2024-02-20 DIAGNOSIS — Q74.2 ACCESSORY NAVICULAR BONE OF FOOT: Primary | ICD-10-CM

## 2024-02-20 DIAGNOSIS — M79.672 LEFT FOOT PAIN: ICD-10-CM

## 2024-02-20 DIAGNOSIS — M76.822 POSTERIOR TIBIAL TENDINITIS OF LEFT LOWER EXTREMITY: ICD-10-CM

## 2024-02-20 PROCEDURE — 20551 NJX 1 TENDON ORIGIN/INSJ: CPT | Performed by: PODIATRIST

## 2024-02-20 PROCEDURE — 99203 OFFICE O/P NEW LOW 30 MIN: CPT | Performed by: PODIATRIST

## 2024-02-20 RX ORDER — BUPIVACAINE HYDROCHLORIDE 2.5 MG/ML
1 INJECTION, SOLUTION INFILTRATION; PERINEURAL
Status: SHIPPED | OUTPATIENT
Start: 2024-02-20

## 2024-02-20 RX ORDER — TRIAMCINOLONE ACETONIDE 40 MG/ML
20 INJECTION, SUSPENSION INTRA-ARTICULAR; INTRAMUSCULAR
Status: SHIPPED | OUTPATIENT
Start: 2024-02-20

## 2024-02-20 RX ADMIN — TRIAMCINOLONE ACETONIDE 20 MG: 40 INJECTION, SUSPENSION INTRA-ARTICULAR; INTRAMUSCULAR at 09:00

## 2024-02-20 RX ADMIN — BUPIVACAINE HYDROCHLORIDE 1 ML: 2.5 INJECTION, SOLUTION INFILTRATION; PERINEURAL at 09:00
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
## 2024-02-22 ENCOUNTER — OFFICE VISIT (OUTPATIENT)
Dept: FAMILY MEDICINE CLINIC | Facility: CLINIC | Age: 44
End: 2024-02-22
Payer: COMMERCIAL

## 2024-02-22 VITALS
HEIGHT: 63 IN | TEMPERATURE: 97.8 F | SYSTOLIC BLOOD PRESSURE: 124 MMHG | DIASTOLIC BLOOD PRESSURE: 86 MMHG | OXYGEN SATURATION: 98 % | HEART RATE: 108 BPM | WEIGHT: 204.8 LBS | BODY MASS INDEX: 36.29 KG/M2

## 2024-02-22 DIAGNOSIS — Z23 IMMUNIZATION DUE: ICD-10-CM

## 2024-02-22 DIAGNOSIS — Z13.6 SCREENING FOR CARDIOVASCULAR, RESPIRATORY, AND GENITOURINARY DISEASES: ICD-10-CM

## 2024-02-22 DIAGNOSIS — Z01.84 IMMUNITY STATUS TESTING: ICD-10-CM

## 2024-02-22 DIAGNOSIS — F41.1 GENERALIZED ANXIETY DISORDER: ICD-10-CM

## 2024-02-22 DIAGNOSIS — Z23 ENCOUNTER FOR IMMUNIZATION: ICD-10-CM

## 2024-02-22 DIAGNOSIS — Z13.83 SCREENING FOR CARDIOVASCULAR, RESPIRATORY, AND GENITOURINARY DISEASES: ICD-10-CM

## 2024-02-22 DIAGNOSIS — Z12.39 ENCOUNTER FOR SCREENING FOR MALIGNANT NEOPLASM OF BREAST, UNSPECIFIED SCREENING MODALITY: ICD-10-CM

## 2024-02-22 DIAGNOSIS — H69.93 ETD (EUSTACHIAN TUBE DYSFUNCTION), BILATERAL: ICD-10-CM

## 2024-02-22 DIAGNOSIS — Z00.00 ANNUAL PHYSICAL EXAM: Primary | ICD-10-CM

## 2024-02-22 DIAGNOSIS — Z13.89 SCREENING FOR CARDIOVASCULAR, RESPIRATORY, AND GENITOURINARY DISEASES: ICD-10-CM

## 2024-02-22 PROCEDURE — 99214 OFFICE O/P EST MOD 30 MIN: CPT | Performed by: FAMILY MEDICINE

## 2024-02-22 PROCEDURE — 99396 PREV VISIT EST AGE 40-64: CPT | Performed by: FAMILY MEDICINE

## 2024-02-22 PROCEDURE — 90471 IMMUNIZATION ADMIN: CPT | Performed by: FAMILY MEDICINE

## 2024-02-22 PROCEDURE — 90715 TDAP VACCINE 7 YRS/> IM: CPT | Performed by: FAMILY MEDICINE
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
## 2024-02-23 PROBLEM — Z12.39 BREAST CANCER SCREENING: Status: ACTIVE | Noted: 2024-02-23

## 2024-02-23 PROBLEM — H69.93 ETD (EUSTACHIAN TUBE DYSFUNCTION), BILATERAL: Status: ACTIVE | Noted: 2024-02-23

## 2024-02-23 NOTE — ASSESSMENT & PLAN NOTE
Doing well. Will decrease Lexapro to 5mg qd. Consider stopping med in 1-2m if doing well on lower dose. Recheck 1y - earlier if needed

## 2024-02-27 ENCOUNTER — CLINICAL SUPPORT (OUTPATIENT)
Dept: FAMILY MEDICINE CLINIC | Facility: CLINIC | Age: 44
End: 2024-02-27
Payer: COMMERCIAL

## 2024-02-27 DIAGNOSIS — Z11.1 PPD SCREENING TEST: Primary | ICD-10-CM

## 2024-02-27 PROCEDURE — 86580 TB INTRADERMAL TEST: CPT

## 2024-02-29 ENCOUNTER — CLINICAL SUPPORT (OUTPATIENT)
Dept: FAMILY MEDICINE CLINIC | Facility: CLINIC | Age: 44
End: 2024-02-29

## 2024-02-29 DIAGNOSIS — Z11.1 PPD SCREENING TEST: Primary | ICD-10-CM

## 2024-02-29 LAB
INDURATION: 0 MM
TB SKIN TEST: NEGATIVE

## 2024-03-28 ENCOUNTER — APPOINTMENT (OUTPATIENT)
Dept: LAB | Facility: CLINIC | Age: 44
End: 2024-03-28
Payer: COMMERCIAL

## 2024-03-28 DIAGNOSIS — F41.1 GENERALIZED ANXIETY DISORDER: ICD-10-CM

## 2024-03-28 DIAGNOSIS — Z13.89 SCREENING FOR CARDIOVASCULAR, RESPIRATORY, AND GENITOURINARY DISEASES: ICD-10-CM

## 2024-03-28 DIAGNOSIS — Z01.84 IMMUNITY STATUS TESTING: ICD-10-CM

## 2024-03-28 DIAGNOSIS — Z13.6 SCREENING FOR CARDIOVASCULAR, RESPIRATORY, AND GENITOURINARY DISEASES: ICD-10-CM

## 2024-03-28 DIAGNOSIS — Z13.83 SCREENING FOR CARDIOVASCULAR, RESPIRATORY, AND GENITOURINARY DISEASES: ICD-10-CM

## 2024-03-28 LAB
ALBUMIN SERPL BCP-MCNC: 4.2 G/DL (ref 3.5–5)
ALP SERPL-CCNC: 51 U/L (ref 34–104)
ALT SERPL W P-5'-P-CCNC: 13 U/L (ref 7–52)
ANION GAP SERPL CALCULATED.3IONS-SCNC: 8 MMOL/L (ref 4–13)
AST SERPL W P-5'-P-CCNC: 15 U/L (ref 13–39)
BASOPHILS # BLD AUTO: 0.09 THOUSANDS/ÂΜL (ref 0–0.1)
BASOPHILS NFR BLD AUTO: 1 % (ref 0–1)
BILIRUB SERPL-MCNC: 0.75 MG/DL (ref 0.2–1)
BUN SERPL-MCNC: 11 MG/DL (ref 5–25)
CALCIUM SERPL-MCNC: 9.1 MG/DL (ref 8.4–10.2)
CHLORIDE SERPL-SCNC: 101 MMOL/L (ref 96–108)
CHOLEST SERPL-MCNC: 202 MG/DL
CO2 SERPL-SCNC: 29 MMOL/L (ref 21–32)
CREAT SERPL-MCNC: 0.72 MG/DL (ref 0.6–1.3)
EOSINOPHIL # BLD AUTO: 0.07 THOUSAND/ÂΜL (ref 0–0.61)
EOSINOPHIL NFR BLD AUTO: 1 % (ref 0–6)
ERYTHROCYTE [DISTWIDTH] IN BLOOD BY AUTOMATED COUNT: 12.4 % (ref 11.6–15.1)
GFR SERPL CREATININE-BSD FRML MDRD: 102 ML/MIN/1.73SQ M
GLUCOSE P FAST SERPL-MCNC: 94 MG/DL (ref 65–99)
HCT VFR BLD AUTO: 42.5 % (ref 34.8–46.1)
HDLC SERPL-MCNC: 43 MG/DL
HGB BLD-MCNC: 13.6 G/DL (ref 11.5–15.4)
IMM GRANULOCYTES # BLD AUTO: 0.03 THOUSAND/UL (ref 0–0.2)
IMM GRANULOCYTES NFR BLD AUTO: 0 % (ref 0–2)
LDLC SERPL CALC-MCNC: 122 MG/DL (ref 0–100)
LYMPHOCYTES # BLD AUTO: 2.63 THOUSANDS/ÂΜL (ref 0.6–4.47)
LYMPHOCYTES NFR BLD AUTO: 31 % (ref 14–44)
MCH RBC QN AUTO: 28.8 PG (ref 26.8–34.3)
MCHC RBC AUTO-ENTMCNC: 32 G/DL (ref 31.4–37.4)
MCV RBC AUTO: 90 FL (ref 82–98)
MEV IGG SER QL IA: ABNORMAL
MONOCYTES # BLD AUTO: 0.51 THOUSAND/ÂΜL (ref 0.17–1.22)
MONOCYTES NFR BLD AUTO: 6 % (ref 4–12)
MUV IGG SER QL IA: ABNORMAL
NEUTROPHILS # BLD AUTO: 5.15 THOUSANDS/ÂΜL (ref 1.85–7.62)
NEUTS SEG NFR BLD AUTO: 61 % (ref 43–75)
NONHDLC SERPL-MCNC: 159 MG/DL
NRBC BLD AUTO-RTO: 0 /100 WBCS
PLATELET # BLD AUTO: 388 THOUSANDS/UL (ref 149–390)
PMV BLD AUTO: 10.3 FL (ref 8.9–12.7)
POTASSIUM SERPL-SCNC: 4 MMOL/L (ref 3.5–5.3)
PROT SERPL-MCNC: 6.8 G/DL (ref 6.4–8.4)
RBC # BLD AUTO: 4.73 MILLION/UL (ref 3.81–5.12)
RUBV IGG SERPL IA-ACNC: 78.3 IU/ML
SODIUM SERPL-SCNC: 138 MMOL/L (ref 135–147)
TRIGL SERPL-MCNC: 187 MG/DL
VZV IGG SER QL IA: NORMAL
WBC # BLD AUTO: 8.48 THOUSAND/UL (ref 4.31–10.16)

## 2024-03-28 PROCEDURE — 86765 RUBEOLA ANTIBODY: CPT

## 2024-03-28 PROCEDURE — 36415 COLL VENOUS BLD VENIPUNCTURE: CPT

## 2024-03-28 PROCEDURE — 80061 LIPID PANEL: CPT

## 2024-03-28 PROCEDURE — 86787 VARICELLA-ZOSTER ANTIBODY: CPT

## 2024-03-28 PROCEDURE — 80053 COMPREHEN METABOLIC PANEL: CPT

## 2024-03-28 PROCEDURE — 85025 COMPLETE CBC W/AUTO DIFF WBC: CPT

## 2024-03-28 PROCEDURE — 86762 RUBELLA ANTIBODY: CPT

## 2024-03-28 PROCEDURE — 86735 MUMPS ANTIBODY: CPT

## 2024-04-01 ENCOUNTER — TELEPHONE (OUTPATIENT)
Dept: FAMILY MEDICINE CLINIC | Facility: CLINIC | Age: 44
End: 2024-04-01

## 2024-04-01 NOTE — TELEPHONE ENCOUNTER
----- Message from Everton Astorga MD sent at 3/31/2024  8:18 PM EDT -----  Pt is not immune to Measles or Mumps.  Offer MMR.  Pt is immune to chickenpox

## 2024-04-02 ENCOUNTER — TELEPHONE (OUTPATIENT)
Age: 44
End: 2024-04-02

## 2024-04-02 NOTE — TELEPHONE ENCOUNTER
Caller: Sonja     Doctor and/or Office: Solomon/Jewel     CB#: [phone]     Escalation: Care Patient is interested in sx she stated the last shot she got only helped for a couple of days. Wanted to know can she set up a date and then come in to do paper work or does she need a appt first?  Please advise thank you.

## 2024-04-03 ENCOUNTER — APPOINTMENT (OUTPATIENT)
Dept: URGENT CARE | Facility: MEDICAL CENTER | Age: 44
End: 2024-04-03

## 2024-04-04 ENCOUNTER — TELEPHONE (OUTPATIENT)
Dept: OBGYN CLINIC | Facility: CLINIC | Age: 44
End: 2024-04-04

## 2024-04-04 NOTE — TELEPHONE ENCOUNTER
S/w pt to schedule surgery with Dr. Carbajal.  Surgery date agreed upon-pt to come to office to sign consent, schedule p/o appts, and review pre-op instructions.
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
## 2024-04-23 PROBLEM — Z12.39 BREAST CANCER SCREENING: Status: RESOLVED | Noted: 2024-02-23 | Resolved: 2024-04-23

## 2024-04-29 ENCOUNTER — TELEPHONE (OUTPATIENT)
Age: 44
End: 2024-04-29

## 2024-04-29 NOTE — TELEPHONE ENCOUNTER
Caller: Sonja Parrish    Doctor: Solomon    Reason for call: Sonja needs to reschedule her surgery for 5/31 to September.  Also she is experiencing bruising on her foot at the site that she is to have surgery on.  Is this normal?  Can someone please reach out to her?  Thank you.    Call back#: [phone]

## 2024-05-03 NOTE — TELEPHONE ENCOUNTER
Rescheduled surgery to 9/13/24  PO #1 9/16/24  PO #2 9/30/24    Patient informed to schedule H&P with PCP and have blood work done within 30 days of surgery.

## 2024-08-07 ENCOUNTER — TELEPHONE (OUTPATIENT)
Dept: OBGYN CLINIC | Facility: CLINIC | Age: 44
End: 2024-08-07

## 2024-08-07 NOTE — TELEPHONE ENCOUNTER
L/m for pt to return call to confirm if she is proceeding with surgery on 9/13 with Dr. aCrbajal as she has been seeing a [facility] provider for the same issue.  Provided my direct call back number.

## 2024-08-27 ENCOUNTER — TELEPHONE (OUTPATIENT)
Dept: OBGYN CLINIC | Facility: CLINIC | Age: 44
End: 2024-08-27

## 2024-08-27 NOTE — TELEPHONE ENCOUNTER
L/m for pt to return call.  Reminded pt to have her pre-op blood work done and to see her PCP for a pre-op appt.  Stressed that without these being completed, her surgery will be postponed.  Provided my direct call back number.

## 2024-08-30 ENCOUNTER — APPOINTMENT (OUTPATIENT)
Dept: LAB | Facility: CLINIC | Age: 44
End: 2024-08-30

## 2024-08-30 DIAGNOSIS — Q74.2 ACCESSORY NAVICULAR BONE OF FOOT: ICD-10-CM

## 2024-08-30 DIAGNOSIS — M79.672 LEFT FOOT PAIN: ICD-10-CM

## 2024-08-30 DIAGNOSIS — M76.822 POSTERIOR TIBIAL TENDINITIS OF LEFT LOWER EXTREMITY: ICD-10-CM

## 2024-08-30 LAB
ALBUMIN SERPL BCG-MCNC: 4.1 G/DL (ref 3.5–5)
ALP SERPL-CCNC: 53 U/L (ref 34–104)
ALT SERPL W P-5'-P-CCNC: 14 U/L (ref 7–52)
ANION GAP SERPL CALCULATED.3IONS-SCNC: 10 MMOL/L (ref 4–13)
AST SERPL W P-5'-P-CCNC: 14 U/L (ref 13–39)
BASOPHILS # BLD AUTO: 0.06 THOUSANDS/ÂΜL (ref 0–0.1)
BASOPHILS NFR BLD AUTO: 1 % (ref 0–1)
BILIRUB SERPL-MCNC: 0.76 MG/DL (ref 0.2–1)
BUN SERPL-MCNC: 10 MG/DL (ref 5–25)
CALCIUM SERPL-MCNC: 8.8 MG/DL (ref 8.4–10.2)
CHLORIDE SERPL-SCNC: 103 MMOL/L (ref 96–108)
CO2 SERPL-SCNC: 26 MMOL/L (ref 21–32)
CREAT SERPL-MCNC: 0.78 MG/DL (ref 0.6–1.3)
EOSINOPHIL # BLD AUTO: 0.05 THOUSAND/ÂΜL (ref 0–0.61)
EOSINOPHIL NFR BLD AUTO: 1 % (ref 0–6)
ERYTHROCYTE [DISTWIDTH] IN BLOOD BY AUTOMATED COUNT: 12.5 % (ref 11.6–15.1)
GFR SERPL CREATININE-BSD FRML MDRD: 92 ML/MIN/1.73SQ M
GLUCOSE P FAST SERPL-MCNC: 97 MG/DL (ref 65–99)
HCT VFR BLD AUTO: 42.5 % (ref 34.8–46.1)
HGB BLD-MCNC: 13.6 G/DL (ref 11.5–15.4)
IMM GRANULOCYTES # BLD AUTO: 0.02 THOUSAND/UL (ref 0–0.2)
IMM GRANULOCYTES NFR BLD AUTO: 0 % (ref 0–2)
LYMPHOCYTES # BLD AUTO: 2.57 THOUSANDS/ÂΜL (ref 0.6–4.47)
LYMPHOCYTES NFR BLD AUTO: 35 % (ref 14–44)
MCH RBC QN AUTO: 28.4 PG (ref 26.8–34.3)
MCHC RBC AUTO-ENTMCNC: 32 G/DL (ref 31.4–37.4)
MCV RBC AUTO: 89 FL (ref 82–98)
MONOCYTES # BLD AUTO: 0.45 THOUSAND/ÂΜL (ref 0.17–1.22)
MONOCYTES NFR BLD AUTO: 6 % (ref 4–12)
NEUTROPHILS # BLD AUTO: 4.22 THOUSANDS/ÂΜL (ref 1.85–7.62)
NEUTS SEG NFR BLD AUTO: 57 % (ref 43–75)
NRBC BLD AUTO-RTO: 0 /100 WBCS
PLATELET # BLD AUTO: 384 THOUSANDS/UL (ref 149–390)
PMV BLD AUTO: 10.4 FL (ref 8.9–12.7)
POTASSIUM SERPL-SCNC: 4.3 MMOL/L (ref 3.5–5.3)
PROT SERPL-MCNC: 6.6 G/DL (ref 6.4–8.4)
RBC # BLD AUTO: 4.79 MILLION/UL (ref 3.81–5.12)
SODIUM SERPL-SCNC: 139 MMOL/L (ref 135–147)
WBC # BLD AUTO: 7.37 THOUSAND/UL (ref 4.31–10.16)

## 2024-08-30 PROCEDURE — 36415 COLL VENOUS BLD VENIPUNCTURE: CPT

## 2024-08-30 PROCEDURE — 80053 COMPREHEN METABOLIC PANEL: CPT

## 2024-08-30 PROCEDURE — 85025 COMPLETE CBC W/AUTO DIFF WBC: CPT

## 2024-09-04 ENCOUNTER — CONSULT (OUTPATIENT)
Dept: FAMILY MEDICINE CLINIC | Facility: CLINIC | Age: 44
End: 2024-09-04
Payer: COMMERCIAL

## 2024-09-04 VITALS
OXYGEN SATURATION: 100 % | HEART RATE: 115 BPM | DIASTOLIC BLOOD PRESSURE: 86 MMHG | WEIGHT: 203.5 LBS | BODY MASS INDEX: 36.06 KG/M2 | HEIGHT: 63 IN | SYSTOLIC BLOOD PRESSURE: 124 MMHG | TEMPERATURE: 97.8 F

## 2024-09-04 DIAGNOSIS — Z01.818 PREOPERATIVE EXAMINATION: Primary | ICD-10-CM

## 2024-09-04 PROCEDURE — 99214 OFFICE O/P EST MOD 30 MIN: CPT | Performed by: NURSE PRACTITIONER

## 2024-09-04 RX ORDER — PREDNISOLONE ACETATE 10 MG/ML
SUSPENSION/ DROPS OPHTHALMIC
COMMUNITY
Start: 2024-07-08
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
## 2024-09-10 NOTE — PRE-PROCEDURE INSTRUCTIONS
Pre-Surgery Instructions:   Medication Instructions    Ascorbic Acid, Vitamin C, (VITAMIN C) 100 MG tablet Stop taking 7 days prior to surgery.    B COMPLEX VITAMINS PO Stop taking 7 days prior to surgery.    calcium-vitamin D (OSCAL 500 + D) 500 mg-200 units per tablet Stop taking 7 days prior to surgery.    escitalopram (LEXAPRO) 10 mg tablet Take day of surgery.    Misc Natural Products (Elderberry Immune Complex) CHEW Stop taking 7 days prior to surgery.    Multiple Vitamin (DAILY VALUE MULTIVITAMIN) TABS Stop taking 7 days prior to surgery.    omega-3-acid ethyl esters (LOVAZA) 1 g capsule Stop taking 7 days prior to surgery.    prednisoLONE acetate (PRED FORTE) 1 % ophthalmic suspension Uses PRN- OK to take day of surgery      Medication instructions for day surgery reviewed. Please use only a sip of water to take your instructed medications. Avoid all over the counter vitamins, supplements and NSAIDS for one week prior to surgery per anesthesia guidelines. Tylenol is ok to take as needed.     You will receive a call one business day prior to surgery with an arrival time and hospital directions. If your surgery is scheduled on a Monday, the hospital will be calling you on the Friday prior to your surgery. If you have not heard from anyone by 8pm, please call the hospital supervisor through the hospital  at [phone]. ([place] [phone] or [place] [phone]).    Do not eat or drink anything after midnight the night before your surgery, including candy, mints, lifesavers, or chewing gum. Do not drink alcohol 24hrs before your surgery. Try not to smoke at least 24hrs before your surgery.       Follow the pre surgery showering instructions as listed in the “My Surgical Experience Booklet” or otherwise provided by your surgeon's office. Do not use a blade to shave the surgical area 1 week before surgery. It is okay to use a clean electric clippers up to 24 hours before surgery. Do not apply any  lotions, creams, including makeup, cologne, deodorant, or perfumes after showering on the day of your surgery. Do not use dry shampoo, hair spray, hair gel, or any type of hair products.     No contact lenses, eye make-up, or artificial eyelashes. Remove nail polish, including gel polish, and any artificial, gel, or acrylic nails if possible. Remove all jewelry including rings and body piercing jewelry.     Wear causal clothing that is easy to take on and off. Consider your type of surgery.    Keep any valuables, jewelry, piercings at home. Please bring any specially ordered equipment (sling, braces) if indicated.    Arrange for a responsible person to drive you to and from the hospital on the day of your surgery. Please confirm the visitor policy for the day of your procedure when you receive your phone call with an arrival time.     Call the surgeon's office with any new illnesses, exposures, or additional questions prior to surgery.    Please reference your “My Surgical Experience Booklet” for additional information to prepare for your upcoming surgery.

## 2024-09-12 ENCOUNTER — ANESTHESIA EVENT (OUTPATIENT)
Dept: PERIOP | Facility: HOSPITAL | Age: 44
End: 2024-09-12
Payer: COMMERCIAL

## 2024-09-13 ENCOUNTER — APPOINTMENT (OUTPATIENT)
Dept: RADIOLOGY | Facility: HOSPITAL | Age: 44
End: 2024-09-13
Payer: COMMERCIAL

## 2024-09-13 ENCOUNTER — HOSPITAL ENCOUNTER (OUTPATIENT)
Facility: HOSPITAL | Age: 44
Setting detail: OUTPATIENT SURGERY
Discharge: HOME | End: 2024-09-13
Attending: PODIATRIST | Admitting: PODIATRIST
Payer: COMMERCIAL

## 2024-09-13 ENCOUNTER — ANESTHESIA (OUTPATIENT)
Dept: PERIOP | Facility: HOSPITAL | Age: 44
End: 2024-09-13
Payer: COMMERCIAL

## 2024-09-13 VITALS
DIASTOLIC BLOOD PRESSURE: 83 MMHG | OXYGEN SATURATION: 97 % | WEIGHT: 203.9 LBS | HEIGHT: 62 IN | SYSTOLIC BLOOD PRESSURE: 132 MMHG | TEMPERATURE: 97.7 F | RESPIRATION RATE: 18 BRPM | BODY MASS INDEX: 37.52 KG/M2 | HEART RATE: 80 BPM

## 2024-09-13 DIAGNOSIS — Z98.890 STATUS POST FOOT SURGERY: Primary | ICD-10-CM

## 2024-09-13 LAB
EXT PREGNANCY TEST URINE: NEGATIVE
EXT. CONTROL: NORMAL

## 2024-09-13 PROCEDURE — C1713 ANCHOR/SCREW BN/BN,TIS/BN: HCPCS | Performed by: PODIATRIST

## 2024-09-13 PROCEDURE — C9290 INJ, BUPIVACAINE LIPOSOME: HCPCS | Performed by: STUDENT IN AN ORGANIZED HEALTH CARE EDUCATION/TRAINING PROGRAM

## 2024-09-13 PROCEDURE — 28238 REVISION OF FOOT TENDON: CPT | Performed by: PODIATRIST

## 2024-09-13 PROCEDURE — 73630 X-RAY EXAM OF FOOT: CPT

## 2024-09-13 PROCEDURE — 73620 X-RAY EXAM OF FOOT: CPT

## 2024-09-13 PROCEDURE — 81025 URINE PREGNANCY TEST: CPT | Performed by: PODIATRIST

## 2024-09-13 PROCEDURE — 99024 POSTOP FOLLOW-UP VISIT: CPT | Performed by: PODIATRIST

## 2024-09-13 DEVICE — SONICANCHOR KIT
Type: IMPLANTABLE DEVICE | Site: FOOT | Status: FUNCTIONAL
Brand: SONICANCHOR

## 2024-09-13 RX ORDER — PROPOFOL 10 MG/ML
INJECTION, EMULSION INTRAVENOUS AS NEEDED
Status: DISCONTINUED | OUTPATIENT
Start: 2024-09-13 | End: 2024-09-13

## 2024-09-13 RX ORDER — MAGNESIUM HYDROXIDE 1200 MG/15ML
LIQUID ORAL AS NEEDED
Status: DISCONTINUED | OUTPATIENT
Start: 2024-09-13 | End: 2024-09-13 | Stop reason: HOSPADM

## 2024-09-13 RX ORDER — LIDOCAINE HYDROCHLORIDE 10 MG/ML
INJECTION, SOLUTION EPIDURAL; INFILTRATION; INTRACAUDAL; PERINEURAL AS NEEDED
Status: DISCONTINUED | OUTPATIENT
Start: 2024-09-13 | End: 2024-09-13

## 2024-09-13 RX ORDER — MIDAZOLAM HYDROCHLORIDE 2 MG/2ML
INJECTION, SOLUTION INTRAMUSCULAR; INTRAVENOUS AS NEEDED
Status: DISCONTINUED | OUTPATIENT
Start: 2024-09-13 | End: 2024-09-13

## 2024-09-13 RX ORDER — ONDANSETRON 2 MG/ML
4 INJECTION INTRAMUSCULAR; INTRAVENOUS ONCE AS NEEDED
Status: DISCONTINUED | OUTPATIENT
Start: 2024-09-13 | End: 2024-09-13 | Stop reason: HOSPADM

## 2024-09-13 RX ORDER — SENNOSIDES 8.6 MG
650 CAPSULE ORAL EVERY 8 HOURS PRN
Qty: 30 TABLET | Refills: 0 | Status: SHIPPED | OUTPATIENT
Start: 2024-09-13 | End: 2024-09-23 | Stop reason: SDUPTHER

## 2024-09-13 RX ORDER — HYDROMORPHONE HCL/PF 1 MG/ML
0.5 SYRINGE (ML) INJECTION
Status: DISCONTINUED | OUTPATIENT
Start: 2024-09-13 | End: 2024-09-13 | Stop reason: HOSPADM

## 2024-09-13 RX ORDER — OXYCODONE AND ACETAMINOPHEN 5; 325 MG/1; MG/1
1 TABLET ORAL EVERY 4 HOURS PRN
Qty: 9 TABLET | Refills: 0 | Status: SHIPPED | OUTPATIENT
Start: 2024-09-13

## 2024-09-13 RX ORDER — CEFAZOLIN SODIUM 2 G/50ML
2000 SOLUTION INTRAVENOUS ONCE
Status: COMPLETED | OUTPATIENT
Start: 2024-09-13 | End: 2024-09-13

## 2024-09-13 RX ORDER — CHLORHEXIDINE GLUCONATE ORAL RINSE 1.2 MG/ML
15 SOLUTION DENTAL ONCE
Status: COMPLETED | OUTPATIENT
Start: 2024-09-13 | End: 2024-09-13

## 2024-09-13 RX ORDER — ONDANSETRON 2 MG/ML
INJECTION INTRAMUSCULAR; INTRAVENOUS AS NEEDED
Status: DISCONTINUED | OUTPATIENT
Start: 2024-09-13 | End: 2024-09-13

## 2024-09-13 RX ORDER — FENTANYL CITRATE 50 UG/ML
INJECTION, SOLUTION INTRAMUSCULAR; INTRAVENOUS AS NEEDED
Status: DISCONTINUED | OUTPATIENT
Start: 2024-09-13 | End: 2024-09-13

## 2024-09-13 RX ORDER — SODIUM CHLORIDE, SODIUM LACTATE, POTASSIUM CHLORIDE, CALCIUM CHLORIDE 600; 310; 30; 20 MG/100ML; MG/100ML; MG/100ML; MG/100ML
INJECTION, SOLUTION INTRAVENOUS CONTINUOUS PRN
Status: DISCONTINUED | OUTPATIENT
Start: 2024-09-13 | End: 2024-09-13

## 2024-09-13 RX ORDER — IBUPROFEN 400 MG/1
400 TABLET, FILM COATED ORAL EVERY 6 HOURS PRN
Qty: 30 TABLET | Refills: 0 | Status: SHIPPED | OUTPATIENT
Start: 2024-09-13

## 2024-09-13 RX ORDER — DEXAMETHASONE SODIUM PHOSPHATE 10 MG/ML
INJECTION, SOLUTION INTRAMUSCULAR; INTRAVENOUS AS NEEDED
Status: DISCONTINUED | OUTPATIENT
Start: 2024-09-13 | End: 2024-09-13

## 2024-09-13 RX ORDER — FENTANYL CITRATE/PF 50 MCG/ML
25 SYRINGE (ML) INJECTION
Status: DISCONTINUED | OUTPATIENT
Start: 2024-09-13 | End: 2024-09-13 | Stop reason: HOSPADM

## 2024-09-13 RX ORDER — BUPIVACAINE HYDROCHLORIDE 2.5 MG/ML
INJECTION, SOLUTION EPIDURAL; INFILTRATION; INTRACAUDAL
Status: COMPLETED | OUTPATIENT
Start: 2024-09-13 | End: 2024-09-13

## 2024-09-13 RX ADMIN — CHLORHEXIDINE GLUCONATE 0.12% ORAL RINSE 15 ML: 1.2 LIQUID ORAL at 10:19

## 2024-09-13 RX ADMIN — FENTANYL CITRATE 25 MCG: 50 INJECTION INTRAMUSCULAR; INTRAVENOUS at 13:18

## 2024-09-13 RX ADMIN — FENTANYL CITRATE 25 MCG: 50 INJECTION INTRAMUSCULAR; INTRAVENOUS at 13:29

## 2024-09-13 RX ADMIN — FENTANYL CITRATE 50 MCG: 50 INJECTION, SOLUTION INTRAMUSCULAR; INTRAVENOUS at 11:33

## 2024-09-13 RX ADMIN — BUPIVACAINE 10 ML: 13.3 INJECTION, SUSPENSION, LIPOSOMAL INFILTRATION at 11:10

## 2024-09-13 RX ADMIN — SODIUM CHLORIDE, SODIUM LACTATE, POTASSIUM CHLORIDE, AND CALCIUM CHLORIDE: .6; .31; .03; .02 INJECTION, SOLUTION INTRAVENOUS at 11:30

## 2024-09-13 RX ADMIN — LIDOCAINE HYDROCHLORIDE 50 MG: 10 INJECTION, SOLUTION EPIDURAL; INFILTRATION; INTRACAUDAL at 11:33

## 2024-09-13 RX ADMIN — SODIUM CHLORIDE, SODIUM LACTATE, POTASSIUM CHLORIDE, AND CALCIUM CHLORIDE: .6; .31; .03; .02 INJECTION, SOLUTION INTRAVENOUS at 12:58

## 2024-09-13 RX ADMIN — BUPIVACAINE HYDROCHLORIDE 10 ML: 2.5 INJECTION, SOLUTION EPIDURAL; INFILTRATION; INTRACAUDAL; PERINEURAL at 11:10

## 2024-09-13 RX ADMIN — CEFAZOLIN SODIUM 2000 MG: 2 SOLUTION INTRAVENOUS at 11:43

## 2024-09-13 RX ADMIN — DEXAMETHASONE SODIUM PHOSPHATE 10 MG: 10 INJECTION, SOLUTION INTRAMUSCULAR; INTRAVENOUS at 11:43

## 2024-09-13 RX ADMIN — ONDANSETRON 4 MG: 2 INJECTION INTRAMUSCULAR; INTRAVENOUS at 11:43

## 2024-09-13 RX ADMIN — FENTANYL CITRATE 50 MCG: 50 INJECTION, SOLUTION INTRAMUSCULAR; INTRAVENOUS at 12:03

## 2024-09-13 RX ADMIN — BUPIVACAINE HYDROCHLORIDE 10 ML: 2.5 INJECTION, SOLUTION EPIDURAL; INFILTRATION; INTRACAUDAL at 11:10

## 2024-09-13 RX ADMIN — PROPOFOL 170 MG: 10 INJECTION, EMULSION INTRAVENOUS at 11:33

## 2024-09-13 RX ADMIN — MIDAZOLAM HYDROCHLORIDE 2 MG: 1 INJECTION, SOLUTION INTRAMUSCULAR; INTRAVENOUS at 11:01

## 2024-09-13 NOTE — DISCHARGE SUMMARY
Discharge Summary Outpatient Procedure Podiatry -   Sonja Parrish 44 y.o. 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      Provisions for Follow-Up Care/Important appointments:  See after visit summary for information related to follow-up care and any pertinent home health orders.      Discharge Medications:  See after visit summary for reconciled discharge medications provided to patient and family.

## 2024-09-13 NOTE — INTERVAL H&P NOTE
H&P reviewed. After examining the patient I find no changes in the patients condition since the H&P had been written.    Vitals:    09/13/24 1009   BP: 130/93   Pulse: 91   Resp: 18   Temp: 98.9 °F (37.2 °C)   SpO2: 99%

## 2024-09-13 NOTE — ANESTHESIA POSTPROCEDURE EVALUATION
Post-Op Assessment Note    CV Status:  Stable  Pain Score: 0    Pain management: adequate       Mental Status:  Alert and awake   Hydration Status:  Euvolemic   PONV Controlled:  Controlled   Airway Patency:  Patent     Post Op Vitals Reviewed: Yes    No anethesia notable event occurred.    Staff: Anesthesiologist, CRNA               /74 (09/13/24 1303)    Temp 97.8 °F (36.6 °C) (09/13/24 1303)    Pulse 88 (09/13/24 1303)   Resp 12 (09/13/24 1303)    SpO2 98 % (09/13/24 1303)

## 2024-09-13 NOTE — ANESTHESIA PROCEDURE NOTES
Peripheral Block    Patient location during procedure: holding area  Start time: 9/13/2024 11:10 AM  Reason for block: at surgeon's request and post-op pain management  Staffing  Performed by: Fermin Thorpe MD  Authorized by: Fermin Thorpe MD    Preanesthetic Checklist  Completed: patient identified, IV checked, site marked, risks and benefits discussed, surgical consent, monitors and equipment checked, pre-op evaluation and timeout performed  Peripheral Block  Prep: ChloraPrep  Patient monitoring: frequent blood pressure checks, continuous pulse oximetry and heart rate  Block type: Popliteal  Laterality: left  Injection technique: single-shot  Procedures: ultrasound guided, Ultrasound guidance required for the procedure to increase accuracy and safety of medication placement and decrease risk of complications.  Ultrasound permanent image saved  bupivacaine (PF) (MARCAINE) 0.25 % injection 20 mL - Perineural   10 mL - 9/13/2024 11:10:00 AM  bupivacaine liposomal (EXPAREL) 1.3 % injection 20 mL - Perineural   10 mL - 9/13/2024 11:10:00 AM  Needle  Needle type: Stimuplex   Needle gauge: 20 G  Needle length: 4 in  Needle localization: anatomical landmarks and ultrasound guidance  Assessment  Injection assessment: incremental injection, frequent aspiration, injected with ease, negative aspiration, negative for heart rate change, no paresthesia on injection, no symptoms of intraneural/intravenous injection and needle tip visualized at all times  Paresthesia pain: none  Post-procedure:  site cleaned  patient tolerated the procedure well with no immediate complications

## 2024-09-13 NOTE — OP NOTE
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      Complications:   None    Procedure and Technique:  Patient is brought back to the operating under light sedation and transferred to the operative table in the supine position.  After anesthesia was administered a tourniquet was placed on the patient's left lower extremity with ample Webril padding.  Left lower extremity was then prepped and draped in the normal sterile manner.  A preincision timeout was complete with all parties in agreement.  An Esmarch bandage was then utilized to exsanguinate the left lower extremity and the tourniquet was inflated.    Attention was then drawn to the medial foot.  Utilizing C arm evaluation the accessory navicular was identified.  A 15 blade was then utilized to make a 5 cm long curvilinear incision overlying the posterior tibial tendon as well as the navicular.  Blunt dissection was carried down.  The posterior tibial tendon insertion to the  navicular was then identified.  The posterior tibial tendon was then sharply reflected off the navicular.  Utilizing arm evaluation as well as a [place] elevator the accessory navicular bone was identified.  There was noted to be a synostosis between the navicular bone and the accessory navicular bone.  It was sharply reflected and passed off the table.  Utilizing a sagittal saw any remaining bony prominence was palliative from the navicular.  Nursing evaluation confirmation of removal of accessory navicular bone as well as any bony purulence was noted to be excellent.    2 sonic anchors from [employer] were then placed within the navicular bone.  The posterior tibial tendon was reattached to the navicular bone with excellent bony apposition.  The tendon insertion was then reinforced utilizing 2-0 Vicryl in a simple suture type fashion.    The incision was then copiously flushed utilizing sterile saline.  Deep closure was achieved utilizing 3-0 Vicryl.  Subcuticular closure was utilizing 4-0 Vicryl.  Skin closed utilizing 4-0 nylon in a horizontal mattress and simple suture type fashion.  Patient tolerated procedure well with no immediate complications.  Tourniquet was deflated and immediate hyperemic flush was noted to all digits.     Dr. Carbajal was present for the entire procedure.    Patient Disposition:  PACU              SIGNATURE: Josafat Gilbert DPM  DATE: September 13, 2024  TIME: 3:21 PM

## 2024-09-13 NOTE — DISCHARGE INSTR - AVS FIRST PAGE
[facility]  Dr. Carbajal  Post-Operative Instructions    1. Take your prescribed medication as needed. You can take ibuprofen in between doses of the narcotic if needed.   2. Upon arrival at home, lie down and elevate your surgical foot on 2 pillows.  3. Remain quiet, off your feet as much as possible, for the first 24-48 hours. This is when your feet first swell and may become painful. After 48 hours you may begin limited walking following these restrictions: partial weight bearing to the left foot     4. Drink large quantities of water. Consume no alcohol. Continue a well-balanced diet.  5. Report any unusual discomfort or fever to this office.  6. A limited amount of discomfort and swelling is to be expected. In some cases the skin may take on a bruised appearance. The surgical solution that was applied to your foot prior to the operation is dark in color and the operation site may appear to be oozing when it actually is not.  7. A slight amount of blood is to be expected, and is no cause for alarm. Do not remove the dressings. If there is active bleeding and if the bleeding persists, add additional gauze to the bandage, apply direct pressure, elevate your feet and call this office.  8. Do not get the dressings wet. As regular bathing may be inconvenient, sponge baths are recommended. If you shower, make sure the dressing stays dry.   9. When anesthesia wears off and if any discomfort should be present, apply an ice pack directly over the operated area for 15 minute intervals for several hours or until the pain leaves. (USE IN EXCESS OF 15 MINUTES COULD CAUSE FROSTBITE). Do not use hot water bags or electric pads. A convenient icepack can be made by placing ice cubes in a plastic bag and covering this with a towel.  10. If necessary, take a mild laxative before retiring.  11. Wear your special open shoes anytime you put weight on your foot, even if it is just to walk to the bathroom and back. It will  probably be 2 or 3 weeks before you will be permitted to try regular shoes.  12. Having performed the operation, we are interested in a prompt recovery. Please cooperate by following the above instructions.  13. Please call to confirm your post-op appointment or call with any other questions.

## 2024-09-13 NOTE — ANESTHESIA PREPROCEDURE EVALUATION
Procedure:  KIDNER PROCEDURE, removal of accessory bone with debridement & repair of tendon (Left: Foot)    Relevant Problems   NEURO/PSYCH   (+) Anxiety disorder   (+) Chronic pelvic pain in female        Physical Exam    Airway    Mallampati score: I  TM Distance: >3 FB  Neck ROM: full     Dental       Cardiovascular  Cardiovascular exam normal    Pulmonary  Pulmonary exam normal     Other Findings  post-pubertal.      Anesthesia Plan  ASA Score- 2     Anesthesia Type- general with ASA Monitors.         Additional Monitors:     Airway Plan: LMA.           Plan Factors-Exercise tolerance (METS): >4 METS.    Chart reviewed. EKG reviewed.  Existing labs reviewed. Patient summary reviewed.    Patient is not a current smoker.  Patient did not smoke on day of surgery.    Obstructive sleep apnea risk education given perioperatively.        Induction- intravenous.    Postoperative Plan- Plan for postoperative opioid use. Planned trial extubation    Perioperative Resuscitation Plan - Level 1 - Full Code.       Informed Consent- Anesthetic plan and risks discussed with patient and spouse.  I personally reviewed this patient with the CRNA. Discussed and agreed on the Anesthesia Plan with the CRNA..

## 2024-09-13 NOTE — ANESTHESIA PROCEDURE NOTES
Peripheral Block    Patient location during procedure: holding area  Start time: 9/13/2024 11:10 AM  Reason for block: at surgeon's request and post-op pain management  Staffing  Performed by: Fermin Thorpe MD  Authorized by: Fermin Thorpe MD    Preanesthetic Checklist  Completed: patient identified, IV checked, site marked, risks and benefits discussed, surgical consent, monitors and equipment checked, pre-op evaluation and timeout performed  Peripheral Block  Prep: ChloraPrep  Patient monitoring: frequent blood pressure checks, continuous pulse oximetry and heart rate  Block type: Adductor Canal  Laterality: left  Injection technique: single-shot  Procedures: ultrasound guided, Ultrasound guidance required for the procedure to increase accuracy and safety of medication placement and decrease risk of complications.  Ultrasound permanent image saved  bupivacaine (PF) (MARCAINE) 0.25 % injection 20 mL - Perineural   10 mL - 9/13/2024 11:10:00 AM  bupivacaine liposomal (EXPAREL) 1.3 % injection 20 mL - Perineural   10 mL - 9/13/2024 11:10:00 AM  Needle  Needle type: Stimuplex   Needle gauge: 20 G  Needle length: 4 in  Needle localization: anatomical landmarks and ultrasound guidance  Assessment  Injection assessment: incremental injection, frequent aspiration, injected with ease, negative aspiration, negative for heart rate change, no paresthesia on injection, no symptoms of intraneural/intravenous injection and needle tip visualized at all times  Paresthesia pain: none  Post-procedure:  site cleaned  patient tolerated the procedure well with no immediate complications

## 2024-09-16 ENCOUNTER — OFFICE VISIT (OUTPATIENT)
Dept: PODIATRY | Facility: CLINIC | Age: 44
End: 2024-09-16

## 2024-09-16 VITALS
HEIGHT: 62 IN | BODY MASS INDEX: 37.29 KG/M2 | OXYGEN SATURATION: 99 % | HEART RATE: 98 BPM | SYSTOLIC BLOOD PRESSURE: 130 MMHG | DIASTOLIC BLOOD PRESSURE: 90 MMHG

## 2024-09-16 DIAGNOSIS — Z98.890 POSTOPERATIVE STATE: Primary | ICD-10-CM

## 2024-09-16 DIAGNOSIS — Q74.2 ACCESSORY NAVICULAR BONE OF FOOT: ICD-10-CM

## 2024-09-16 DIAGNOSIS — M76.822 POSTERIOR TIBIAL TENDINITIS OF LEFT LOWER EXTREMITY: ICD-10-CM

## 2024-09-16 PROCEDURE — 99024 POSTOP FOLLOW-UP VISIT: CPT | Performed by: PODIATRIST
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
## 2024-09-23 ENCOUNTER — OFFICE VISIT (OUTPATIENT)
Dept: PODIATRY | Facility: CLINIC | Age: 44
End: 2024-09-23

## 2024-09-23 VITALS
HEIGHT: 62 IN | OXYGEN SATURATION: 98 % | HEART RATE: 85 BPM | DIASTOLIC BLOOD PRESSURE: 91 MMHG | SYSTOLIC BLOOD PRESSURE: 121 MMHG | BODY MASS INDEX: 37.29 KG/M2

## 2024-09-23 DIAGNOSIS — Z98.890 STATUS POST FOOT SURGERY: ICD-10-CM

## 2024-09-23 DIAGNOSIS — G89.18 ACUTE POSTOPERATIVE PAIN: Primary | ICD-10-CM

## 2024-09-23 DIAGNOSIS — Z98.890 POSTOPERATIVE STATE: ICD-10-CM

## 2024-09-23 PROCEDURE — RECHECK: Performed by: PODIATRIST

## 2024-09-23 RX ORDER — SENNOSIDES 8.6 MG
650 CAPSULE ORAL EVERY 8 HOURS PRN
Qty: 30 TABLET | Refills: 0 | Status: SHIPPED | OUTPATIENT
Start: 2024-09-23

## 2024-09-23 RX ORDER — CELECOXIB 200 MG/1
200 CAPSULE ORAL 2 TIMES DAILY
Qty: 40 CAPSULE | Refills: 1 | Status: SHIPPED | OUTPATIENT
Start: 2024-09-23

## 2024-09-23 NOTE — PROGRESS NOTES
"  Assessment:   S/P Kidney procedure left foot, removal of accessory bone and debridement and repair of tendon.    Plan:   -Patient was seen evaluated clinic today.  Dressings were removed and the surgical site was examined.  -Of note there is no signs of soft tissue infection at this time.  Sutures are intact with skin coapted.  -Prescribed Celebrex 200 mg capsule for pain.  Patient mentioned stomach pain with prior analgesics.  -Prescribed 650 mg Tylenol  -Continue nonweightbearing to left lower extremity in surgical shoe with crutches.  -Follow-up in 1 week suture removal    I have personally seen and examined the patient, utilizing the extender/resident/physician's assistant for assistance with documentation.  The entire visit including physical exam and formulation/discussion of plan was performed by me.    SUBJECTIVE:  Sonja Parrish is a 44 y.o. female who presents for follow-up status post kidney procedure left foot.  Patient mentions that her pain is relatively well handled however her prior prescribed anti-inflammatories were causing her stomach pain.  She reports no other podiatric complaints at this time.  She is tolerating the surgical shoe and crutches well.  No fever chills nausea vomiting at this time.    PHYSICAL EXAMINATION:  Vital signs: /91 (BP Location: Left arm, Patient Position: Sitting, Cuff Size: Standard)   Pulse 85   Ht 5' 2\" (1.575 m)   LMP 09/12/2024 (Exact Date)   SpO2 98%   BMI 37.29 kg/m²   General: well developed and well nourished, alert, oriented times 3, and appears comfortable  Psychiatric: Normal    MUSCULOSKELETAL EXAMINATION:    Surgical Site: Left foot medial aspect of midfoot, pain to palpation.  Incision: Clean, dry, intact and swelling present  Range of Motion: As expected  Neurovascular status: Neuro intact, good cap refill        STUDIES REVIEWED:  No new imaging today       PROCEDURES PERFORMED:  Procedures  No Procedures performed today   "

## 2024-09-30 ENCOUNTER — OFFICE VISIT (OUTPATIENT)
Dept: PODIATRY | Facility: CLINIC | Age: 44
End: 2024-09-30

## 2024-09-30 VITALS
HEART RATE: 92 BPM | OXYGEN SATURATION: 99 % | SYSTOLIC BLOOD PRESSURE: 139 MMHG | BODY MASS INDEX: 37.29 KG/M2 | HEIGHT: 62 IN | DIASTOLIC BLOOD PRESSURE: 99 MMHG

## 2024-09-30 DIAGNOSIS — Z98.890 STATUS POST FOOT SURGERY: Primary | ICD-10-CM

## 2024-09-30 DIAGNOSIS — Q74.2 ACCESSORY NAVICULAR BONE OF FOOT: ICD-10-CM

## 2024-09-30 PROCEDURE — 99024 POSTOP FOLLOW-UP VISIT: CPT | Performed by: PODIATRIST

## 2024-09-30 NOTE — PROGRESS NOTES
Assessment/Plan:     The patient's clinical examination today significant for a well coapted incision line with all sutures intact.  There are no signs of infection noted.  There is very mild tenderness with deep palpation at the postsurgical site.  Neurovascular status is intact.    The sutures were removed today without complication.  The patient may start guarded weightbearing in a lace up brace with a sneaker as tolerated.    Recommend follow-up in 2 weeks for repeat x-rays of the left foot.     Diagnoses and all orders for this visit:    Status post foot surgery    Accessory navicular bone of foot          Subjective:     Patient ID: Sonja Parrish is a 44 y.o. female.    The patient presents today for follow-up status post excision of a painful accessory navicular bone of the left foot.  She has been doing fairly well from postoperative pain standpoint.  She has maintained strict nonweightbearing to the left lower extremity with crutches.        Review of Systems   Constitutional: Negative.    HENT: Negative.     Eyes: Negative.    Respiratory: Negative.     Cardiovascular: Negative.    Endocrine: Negative.    Musculoskeletal: Negative.    Neurological: Negative.    Hematological: Negative.    Psychiatric/Behavioral: Negative.           Objective:     Physical Exam  Constitutional:       Appearance: Normal appearance.   HENT:      Head: Normocephalic and atraumatic.      Nose: Nose normal.   Cardiovascular:      Pulses:           Dorsalis pedis pulses are 2+ on the left side.        Posterior tibial pulses are 2+ on the left side.   Pulmonary:      Effort: Pulmonary effort is normal.   Feet:      Comments: The patient's clinical examination today significant for a well coapted incision line with all sutures intact.  There are no signs of infection noted.  There is very mild tenderness with deep palpation at the postsurgical site.  Neurovascular status is intact.  Skin:     General: Skin is warm.      Capillary  Refill: Capillary refill takes less than 2 seconds.   Neurological:      General: No focal deficit present.      Mental Status: She is alert and oriented to person, place, and time.   Psychiatric:         Mood and Affect: Mood normal.         Behavior: Behavior normal.         Thought Content: Thought content normal.

## 2024-10-14 ENCOUNTER — OFFICE VISIT (OUTPATIENT)
Dept: PODIATRY | Facility: CLINIC | Age: 44
End: 2024-10-14

## 2024-10-14 ENCOUNTER — HOSPITAL ENCOUNTER (OUTPATIENT)
Dept: RADIOLOGY | Facility: HOSPITAL | Age: 44
Discharge: HOME | End: 2024-10-14
Attending: PODIATRIST
Payer: COMMERCIAL

## 2024-10-14 VITALS
SYSTOLIC BLOOD PRESSURE: 130 MMHG | HEART RATE: 94 BPM | OXYGEN SATURATION: 99 % | BODY MASS INDEX: 37.29 KG/M2 | DIASTOLIC BLOOD PRESSURE: 99 MMHG | HEIGHT: 62 IN

## 2024-10-14 DIAGNOSIS — R53.81 DEBILITY: ICD-10-CM

## 2024-10-14 DIAGNOSIS — Z98.890 POSTOPERATIVE STATE: ICD-10-CM

## 2024-10-14 DIAGNOSIS — Z98.890 STATUS POST FOOT SURGERY: Primary | ICD-10-CM

## 2024-10-14 DIAGNOSIS — Z98.890 STATUS POST FOOT SURGERY: ICD-10-CM

## 2024-10-14 PROCEDURE — 73630 X-RAY EXAM OF FOOT: CPT

## 2024-10-14 PROCEDURE — 99024 POSTOP FOLLOW-UP VISIT: CPT | Performed by: PODIATRIST

## 2024-10-14 NOTE — PROGRESS NOTES
Assessment/Plan:     The patient's clinical examination today significant for very mild tenderness with palpation of the postsurgical site to the medial aspect of the left midfoot.  There is no erythema nor ecchymosis.    X-rays of the patient's left foot taken today were personally viewed interpreted.  There are stable postsurgical changes consistent with excision of the accessory navicular.    The patient will continue splinting and an ankle brace and a supportive sneaker.  I will start her on a course of physical therapy to assist with strengthening and range of motion.  I did make some recommendations for some good-quality OTC arch supports.  When she has these arch supports in hand, she can transition out of the ankle brace as tolerated.    Recommend follow-up in 6 to 8 weeks.     Diagnoses and all orders for this visit:    Status post foot surgery  -     XR foot 3+ vw left; Future  -     Ambulatory referral to Physical Therapy; Future    Debility  -     Ambulatory referral to Physical Therapy; Future    Postoperative state          Subjective:     Patient ID: Sonja Parrish is a 44 y.o. female.    The patient presents today for follow-up status post excision of an accessory navicular bone from her left foot.  She has been doing fairly well from a clinical standpoint.  Her pain is relatively mild.  She does utilize ankle brace and finds it comfortable at times, other times it does seem to be irritating the incision line.        Review of Systems   Constitutional: Negative.    HENT: Negative.     Eyes: Negative.    Respiratory: Negative.     Cardiovascular: Negative.    Endocrine: Negative.    Musculoskeletal: Negative.    Neurological: Negative.    Hematological: Negative.    Psychiatric/Behavioral: Negative.           Objective:     Physical Exam  Constitutional:       Appearance: Normal appearance.   HENT:      Head: Normocephalic and atraumatic.      Nose: Nose normal.   Pulmonary:      Effort: Pulmonary effort  is normal.   Skin:     General: Skin is warm.      Capillary Refill: Capillary refill takes less than 2 seconds.   Neurological:      General: No focal deficit present.      Mental Status: She is alert and oriented to person, place, and time.   Psychiatric:         Mood and Affect: Mood normal.         Behavior: Behavior normal.         Thought Content: Thought content normal.

## 2024-10-14 NOTE — PATIENT INSTRUCTIONS
Recommend quality over the counter arch supports:    - Powerstep [place] series insoles  - Spenco Orthotic Arch insoles  - PCSsole- 3/4 length insoles*

## 2024-10-28 ENCOUNTER — OFFICE VISIT (OUTPATIENT)
Dept: URGENT CARE | Facility: MEDICAL CENTER | Age: 44
End: 2024-10-28
Payer: COMMERCIAL

## 2024-10-28 VITALS
WEIGHT: 200 LBS | TEMPERATURE: 98.9 F | SYSTOLIC BLOOD PRESSURE: 144 MMHG | BODY MASS INDEX: 36.8 KG/M2 | OXYGEN SATURATION: 98 % | DIASTOLIC BLOOD PRESSURE: 93 MMHG | HEART RATE: 90 BPM | HEIGHT: 62 IN | RESPIRATION RATE: 19 BRPM

## 2024-10-28 DIAGNOSIS — R35.0 URINARY FREQUENCY: ICD-10-CM

## 2024-10-28 DIAGNOSIS — B96.89 BACTERIAL VAGINOSIS: Primary | ICD-10-CM

## 2024-10-28 DIAGNOSIS — N76.0 BACTERIAL VAGINOSIS: Primary | ICD-10-CM

## 2024-10-28 LAB
SL AMB  POCT GLUCOSE, UA: NORMAL
SL AMB LEUKOCYTE ESTERASE,UA: NORMAL
SL AMB POCT BILIRUBIN,UA: NORMAL
SL AMB POCT BLOOD,UA: NORMAL
SL AMB POCT CLARITY,UA: YELLOW
SL AMB POCT COLOR,UA: NORMAL
SL AMB POCT KETONES,UA: NORMAL
SL AMB POCT NITRITE,UA: NORMAL
SL AMB POCT PH,UA: 6
SL AMB POCT SPECIFIC GRAVITY,UA: 1.01
SL AMB POCT URINE HCG: NEGATIVE
SL AMB POCT URINE PROTEIN: NORMAL
SL AMB POCT UROBILINOGEN: 0.2

## 2024-10-28 PROCEDURE — 99213 OFFICE O/P EST LOW 20 MIN: CPT | Performed by: STUDENT IN AN ORGANIZED HEALTH CARE EDUCATION/TRAINING PROGRAM

## 2024-10-28 PROCEDURE — 87086 URINE CULTURE/COLONY COUNT: CPT | Performed by: STUDENT IN AN ORGANIZED HEALTH CARE EDUCATION/TRAINING PROGRAM

## 2024-10-28 PROCEDURE — 81002 URINALYSIS NONAUTO W/O SCOPE: CPT | Performed by: STUDENT IN AN ORGANIZED HEALTH CARE EDUCATION/TRAINING PROGRAM

## 2024-10-28 PROCEDURE — 81025 URINE PREGNANCY TEST: CPT | Performed by: STUDENT IN AN ORGANIZED HEALTH CARE EDUCATION/TRAINING PROGRAM

## 2024-10-28 RX ORDER — METRONIDAZOLE 500 MG/1
500 TABLET ORAL EVERY 12 HOURS SCHEDULED
Qty: 14 TABLET | Refills: 0 | Status: SHIPPED | OUTPATIENT
Start: 2024-10-28 | End: 2024-11-04
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
## 2024-10-29 ENCOUNTER — OFFICE VISIT (OUTPATIENT)
Dept: PHYSICAL THERAPY | Facility: CLINIC | Age: 44
End: 2024-10-29
Payer: COMMERCIAL

## 2024-10-29 DIAGNOSIS — Z98.890 STATUS POST FOOT SURGERY: Primary | ICD-10-CM

## 2024-10-29 DIAGNOSIS — R53.81 DEBILITY: ICD-10-CM

## 2024-10-29 DIAGNOSIS — M79.672 LEFT FOOT PAIN: ICD-10-CM

## 2024-10-29 PROCEDURE — 97110 THERAPEUTIC EXERCISES: CPT | Performed by: PHYSICAL THERAPIST

## 2024-10-29 PROCEDURE — 97162 PT EVAL MOD COMPLEX 30 MIN: CPT | Performed by: PHYSICAL THERAPIST

## 2024-10-29 NOTE — PROGRESS NOTES
PT Evaluation     Today's date: 10/29/2024  Patient name: Sonja Parrish  : 1980  MRN: [id number]  Referring provider: Varinder Carbajal DPM  Dx:   Encounter Diagnosis     ICD-10-CM    1. Status post foot surgery  Z98.890 Ambulatory referral to Physical Therapy      2. Debility  R53.81 Ambulatory referral to Physical Therapy                     Assessment  Impairments: abnormal gait, abnormal or restricted ROM, abnormal movement, activity intolerance, impaired physical strength, lacks appropriate home exercise program and pain with function  Functional limitations: ambulation, stair negotiation, squatting, transfers, weight bearing ADLs.    Assessment details: Sonja is s/p 6.5 weeks excision of an accessory navicular bone from her left foot with Dr. Carbajal on 24 (Galloner procedure).  Patient is doing well post operatively. Incisions are intact with no observable sign of infection. Moderate swelling is present, mainly at midfoot and talocrural joint. ROM and strength of the left ankle is reduced as expected. She has moderate flexibility restrictions of gastroc/soleus complex and hamstring on the left. Sonja would benefit from skilled physical therapy at this time to improve function, reduce pain, increase ROM, increase strength, and return to premorbid function.   Understanding of Dx/Px/POC: good     Prognosis: good    Goals  Short Term Goals: to be achieved by 4 weeks  1) Patient to be independent with basic HEP.  2) Decrease pain to 3/10 at its worst.  3) Increase left ankle ROM by 5-10 degrees   4) Increase LE strength by 1/2 MMT grade in all deficient planes.    Long Term Goals: to be achieved by discharge  1) FOTO equal to or greater than expected.  2) Ambulation to improve to maximal level of function  3) Stair negotiation will improve to reciprocal.  4) Pt to participate in age appropriate activities without pain    Plan  Patient would benefit from: skilled PT  Planned modality interventions: cryotherapy and  TENS    Planned therapy interventions: ADL training, manual therapy, motor coordination training, neuromuscular re-education, therapeutic activities, therapeutic exercise, gait training and functional ROM exercises    Frequency: 2x per week for 4-6 weeks.  Treatment plan discussed with: patient        Subjective Evaluation    History of Present Illness  Date of surgery: 9/13/2024  Mechanism of injury: surgery  Mechanism of injury: History of Current Injury: Pt is 6.5 weeks s/p excision of accessory navicular bone of left foot. Pt is no longer in splint/brace and currently wearing HOKAs and orthotics (for 2 weeks now). Pt previously had bunionectomy in both feet (~17 to 20 years ago). Pt feels she is progressing. She continues to have discomfort at medial foot/ankle. She reports worsening swelling when on her feet all day. She did just return from vacation (football game at [facility]) and was on a plane.  She knows her walking gait needs improvement as she feels restricted at involved foot/ankle.  Pain location/Descriptors: Dull pain at medial aspect of ankle and around the ankle of left foot.   Aggravating factors: Weight bearing activities, prolonged standing, walking.   Easing factors: Advil  24 HR pattern: Worse with activity.   Imaging: XR:  IMPRESSION:  Redemonstrated postop changes of left bunionectomy with metallic screw fixation across a healed/healing first metatarsal neck osteotomy and metallic wire fixation along the lateral margin of the left great toe proximal phalanx.    Special Questions: Numbness present over distal tibia of left foot which is improving.   Patient goals:  If You Can course on May 31st. Would like to return to walk and exercise.   Hobbies/Interest: Try to regularly exercise but hasn't since the injury   Occupation:       Patient Goals  Patient goals for therapy: decreased pain, decreased edema, improved balance, increased motion, increased strength and  independence with ADLs/IADLs    Pain  Current pain ratin  At worst pain ratin    Treatments  No previous or current treatments        Objective     Observations   Left Ankle/Foot   Positive for edema, effusion and incision.     Additional Observation Details  Incision intact     Active Range of Motion   Left Ankle/Foot   Dorsiflexion (ke): 5 degrees   Plantar flexion: 60 degrees   Inversion: 20 degrees   Eversion: 10 degrees     Right Ankle/Foot   Dorsiflexion (ke): 10 degrees   Plantar flexion: 70 degrees   Inversion: 22 degrees   Eversion: 5 degrees     Passive Range of Motion   Left Hip   Normal passive range of motion    Right Hip   Normal passive range of motion    Strength/Myotome Testing     Left Hip   Planes of Motion   Flexion: 5  Extension: 5  Abduction: 5    Right Hip   Planes of Motion   Flexion: 5  Extension: 5  Abduction: 5    Left Knee   Flexion: 5  Extension: 5    Right Knee   Flexion: 5  Extension: 5    Left Ankle/Foot   Dorsiflexion: 4-  Plantar flexion: 4-  Inversion: 4-  Eversion: 4-    Right Ankle/Foot   Normal strength    Tests     Additional Tests Details  Moderate restriction in hamstrings and gastroc/soleus complex on L  TC joint mobility deficit on left       Swelling   Left Ankle/Foot   Metatarsal heads: 22.5 cm  Figure 8: 51 cm  Malleoli: 25 cm    Ambulation     Observational Gait   Gait: antalgic   Decreased walking speed and stride length.     Additional Observational Gait Details  Shod gait:  (Hokas with orthotics)   Increased WIL, decreased terminal stance and push off GT on the left              Precautions: Anxiety, s/p foot  surgery on 24      Manuals             Left TC joint mobs             Left STJ  mobs                                       Neuro Re-Ed             SLS             B wobble board (AP/PA)              Standing BAPS for ROM                                                                  Ther Ex             Bicycle              Gastroc stretch with  strap             Soleus stretch with strap             4 way ankle TB                                                                 Ther Activity             Seated HR/TR             Mini squats             Leg press              Lateral walks with TB             Gait Training                                       Modalities                                          Pt was given a HEP with verbal and written instruction:   [email or web address]  Access Code: [email or web address]

## 2024-10-30 LAB — BACTERIA UR CULT: NORMAL

## 2024-10-31 ENCOUNTER — OFFICE VISIT (OUTPATIENT)
Dept: PHYSICAL THERAPY | Facility: CLINIC | Age: 44
End: 2024-10-31
Payer: COMMERCIAL

## 2024-10-31 DIAGNOSIS — Z98.890 STATUS POST FOOT SURGERY: Primary | ICD-10-CM

## 2024-10-31 DIAGNOSIS — R53.81 DEBILITY: ICD-10-CM

## 2024-10-31 DIAGNOSIS — M79.672 LEFT FOOT PAIN: ICD-10-CM

## 2024-10-31 PROCEDURE — 97110 THERAPEUTIC EXERCISES: CPT

## 2024-10-31 PROCEDURE — 97112 NEUROMUSCULAR REEDUCATION: CPT

## 2024-10-31 PROCEDURE — 97140 MANUAL THERAPY 1/> REGIONS: CPT

## 2024-10-31 NOTE — PROGRESS NOTES
"Daily Note     Today's date: 10/31/2024  Patient name: Sonja Parrish  : 1980  MRN: [id number]  Referring provider: Varinder Carbajal DPM  Dx:   Encounter Diagnosis     ICD-10-CM    1. Status post foot surgery  Z98.890       2. Debility  R53.81       3. Left foot pain  M79.672           Start Time: 1358  Stop Time: 1438  Total time in clinic (min): 40 minutes    Subjective: Reports that she is doing well. Does note that she was on her feet a lot in the past three days doing things for Halloween preparation - and she had some discomfort and pain last night. Now just generally feels like a little bit of soreness. Noted that she was hobbling and had a limp.       Objective: See treatment diary below      Assessment: Tolerated treatment well. continue per primary therapist POC. Started per plan with bike for increased bloodflow to promote improved healing and ankle range of motion. Didn't perform significant weight bearing activities as she noted that she was feeling more sore today than during iniital evaluation due to being on her feet and performing activities. Patient demonstrated fatigue post treatment, exhibited good technique with therapeutic exercises, and would benefit from continued PT. Patient to see how she feels after today and potentially try riding peloton - encouraged to evaluate symptoms with this as to avoid any resistance with this.       Plan: Continue per plan of care.  Progress treatment as tolerated.       Precautions: Anxiety, s/p foot  surgery on 24      Manuals 10/31            Left TC joint mobs MH             Left STJ  mobs MH                                       Neuro Re-Ed             SLS Nt             B wobble board (AP/PA)  Seated x30 ea            Standing BAPS for ROM                                                                  Ther Ex             Bicycle  8' L1            Gastroc stretch with strap 3x30\"             Soleus stretch with strap 3x30\"             4 way " ankle TB Red TB 3x10 ea                                                                 Ther Activity             Seated HR/TR X30 ea             Mini squats             Leg press              Lateral walks with TB             Gait Training                                       Modalities

## 2024-11-05 ENCOUNTER — APPOINTMENT (OUTPATIENT)
Dept: PHYSICAL THERAPY | Facility: CLINIC | Age: 44
End: 2024-11-05
Payer: COMMERCIAL

## 2024-11-07 ENCOUNTER — OFFICE VISIT (OUTPATIENT)
Dept: PHYSICAL THERAPY | Facility: CLINIC | Age: 44
End: 2024-11-07
Payer: COMMERCIAL

## 2024-11-07 DIAGNOSIS — M79.672 LEFT FOOT PAIN: ICD-10-CM

## 2024-11-07 DIAGNOSIS — R53.81 DEBILITY: ICD-10-CM

## 2024-11-07 DIAGNOSIS — Z98.890 STATUS POST FOOT SURGERY: Primary | ICD-10-CM

## 2024-11-07 PROCEDURE — 97110 THERAPEUTIC EXERCISES: CPT | Performed by: PHYSICAL THERAPIST

## 2024-11-07 PROCEDURE — 97140 MANUAL THERAPY 1/> REGIONS: CPT | Performed by: PHYSICAL THERAPIST

## 2024-11-07 PROCEDURE — 97112 NEUROMUSCULAR REEDUCATION: CPT | Performed by: PHYSICAL THERAPIST

## 2024-11-07 NOTE — PROGRESS NOTES
"Daily Note     Today's date: 2024  Patient name: Sonja Parrish  : 1980  MRN: [id number]  Referring provider: Varinder Carbajal DPM  Dx:   Encounter Diagnosis     ICD-10-CM    1. Status post foot surgery  Z98.890       2. Left foot pain  M79.672       3. Debility  R53.81                      Subjective: Pt reports increase of pain after standing in line voting (3 hours) plus doing housework. She notes increased swelling in the foot and pain with weight bearing.       Objective: See treatment diary below  Swelling   Left Ankle/Foot   Metatarsal heads: 22.0 cm  Figure 8: 49 cm  Malleoli: 23 cm    Assessment: Tolerated treatment well. Despite increase of pain, mobility and swelling gradually improving. Prescribed foot intrinsic exercises today.  Pt very challenged with new exercises. Cueing provided throughout treatment. Patient would benefit from continued PT      Plan: Continue per plan of care.      Precautions: Anxiety, s/p foot  surgery on 24      Manuals 10/31 11/7           Left TC joint mobs   ANA M           Left STJ  mobs   RHETTK                                     Neuro Re-Ed             SLS Nt             B wobble board (AP/PA)  Seated x30 ea            Standing BAPS for ROM              Soleus heel raise  Seated 3x10 with 15# KB           Toe yoga   15x3\"           Short foot  15x3\"                         Ther Ex             Bicycle  8' L1 8' L1           Gastroc stretch with strap 3x30\"  4x30\"           Soleus stretch with strap 3x30\"  4x30\"            4 way ankle TB Red TB 3x10 ea  Red TB 3x10 ea                                                                Ther Activity             Seated HR/TR X30 ea             Mini squats             Leg press              Lateral walks with TB             Gait Training                                       Modalities                                              "

## 2024-11-12 ENCOUNTER — OFFICE VISIT (OUTPATIENT)
Dept: PHYSICAL THERAPY | Facility: CLINIC | Age: 44
End: 2024-11-12
Payer: COMMERCIAL

## 2024-11-12 DIAGNOSIS — M79.672 LEFT FOOT PAIN: Primary | ICD-10-CM

## 2024-11-12 DIAGNOSIS — R53.81 DEBILITY: ICD-10-CM

## 2024-11-12 DIAGNOSIS — Z98.890 STATUS POST FOOT SURGERY: ICD-10-CM

## 2024-11-12 PROCEDURE — 97110 THERAPEUTIC EXERCISES: CPT | Performed by: PHYSICAL THERAPIST

## 2024-11-12 PROCEDURE — 97112 NEUROMUSCULAR REEDUCATION: CPT | Performed by: PHYSICAL THERAPIST

## 2024-11-12 PROCEDURE — 97140 MANUAL THERAPY 1/> REGIONS: CPT | Performed by: PHYSICAL THERAPIST

## 2024-11-12 NOTE — PROGRESS NOTES
"Daily Note     Today's date: 2024  Patient name: Sonja Parrish  : 1980  MRN: [id number]  Referring provider: Varinder Carbajal DPM  Dx:   Encounter Diagnosis     ICD-10-CM    1. Left foot pain  M79.672       2. Status post foot surgery  Z98.890       3. Debility  R53.81                      Subjective: Pt reports more generalized discomfort in foot, even with being careful. She denies any difficulty and pain with exercise program. Notes more pain with standing.       Objective: See treatment diary below      Assessment: Tolerated treatment fair. Implemented GT flexion with TB resistance, SLS, and Standing HR. No increase of pain with exercise except with SLS. Recommended patient only perform 3 reps of these. Also recommend patient start to track steps to assess load management. Concerned for too much WB and walking throughout the day given increase in discomfort. Pt wears an apple watch and will start to track. Patient would benefit from continued PT.       Plan: Continue per plan of care.      Precautions: Anxiety, s/p foot  surgery on 24      Manuals 10/31 11/7 11/12          Left TC joint mobs   JK JK          Left STJ  mobs   ANA M VIRAMONTES                                    Neuro Re-Ed             SLS Nt   4x10\" with FT support           B wobble board (AP/PA)  Seated x30 ea            Standing BAPS for ROM              Soleus heel raise  Seated 3x10 with 15# KB           Toe yoga   15x3\"           Short foot  15x3\"            Great toe flexion into TB   3x10 rtb          Ther Ex             Bicycle  8' L1 8' L1 8\" l3          Gastroc stretch with strap 3x30\"  4x30\" SB 4x30\"           Soleus stretch with strap 3x30\"  4x30\"            4 way ankle TB Red TB 3x10 ea  Red TB 3x10 ea  Red TB 3x10 ea                                                               Ther Activity             Seated HR/TR X30 ea   Standing HR 2x10           Mini squats             Leg press              Lateral walks with TB    "          Gait Training                                       Modalities                                         1:1 with PT from 830-104pm

## 2024-11-15 ENCOUNTER — OFFICE VISIT (OUTPATIENT)
Dept: PHYSICAL THERAPY | Facility: CLINIC | Age: 44
End: 2024-11-15
Payer: COMMERCIAL

## 2024-11-15 DIAGNOSIS — M79.672 LEFT FOOT PAIN: Primary | ICD-10-CM

## 2024-11-15 DIAGNOSIS — R53.81 DEBILITY: ICD-10-CM

## 2024-11-15 DIAGNOSIS — Z98.890 STATUS POST FOOT SURGERY: ICD-10-CM

## 2024-11-15 PROCEDURE — 97112 NEUROMUSCULAR REEDUCATION: CPT | Performed by: PHYSICAL THERAPIST

## 2024-11-15 PROCEDURE — 97110 THERAPEUTIC EXERCISES: CPT | Performed by: PHYSICAL THERAPIST

## 2024-11-15 PROCEDURE — 97140 MANUAL THERAPY 1/> REGIONS: CPT | Performed by: PHYSICAL THERAPIST

## 2024-11-15 NOTE — PROGRESS NOTES
"Daily Note     Today's date: 11/15/2024  Patient name: Sonja Parrish  : 1980  MRN: [id number]  Referring provider: Varinder Carbajal DPM  Dx:   Encounter Diagnosis     ICD-10-CM    1. Left foot pain  M79.672       2. Debility  R53.81       3. Status post foot surgery  Z98.890                      Subjective: Pt reports being more cognizant of activity and intentional modifying activities which is helping. She attended a women's event at her [employer] yesterday after doing household chores and noticed swelling by the end of the evening.       Objective: See treatment diary below      Assessment: Tolerated treatment well. Discussed evidenced based treatment for tendon pathology and gradual loading program ([employer]). Increase SLS reps to help improve proprioception and balance. She is still having difficulty progressing SLS due to discomfort. She requires hand support when going into SLS. Performed step up with good tolerance.  Patient exhibited good technique with therapeutic exercises and would benefit from continued PT      Plan: Continue per plan of care.      Precautions: Anxiety, s/p foot  surgery on 24      Manuals 10/31 11/7 11/12 11/15         Left TC joint mobs   ANA M VIRAMONTES JK         Left STJ  mobs   RHETTK RHETTK JK                                   Neuro Re-Ed             SLS Nt   4x10\" with FT support  6x10\" with FT support         B wobble board (AP/PA)  Seated x30 ea            Standing BAPS for ROM              Soleus heel raise  Seated 3x10 with 15# KB           Toe yoga   15x3\"           Short foot  15x3\"            Great toe flexion into TB   3x10 rtb 3x10 rtb         Ther Ex             Bicycle  8' L1 8' L1 8\" l3 8' L3         Gastroc stretch with strap 3x30\"  4x30\" SB 4x30\"  SB 10x10\"          Soleus stretch with strap 3x30\"  4x30\"            4 way ankle TB Red TB 3x10 ea  Red TB 3x10 ea  Red TB 3x10 ea  Red TB 3x10 ea                                                              Ther Activity  "            Seated HR/TR X30 ea   Standing HR 2x10  Standing HR 2x10          Mini squats             Leg press              Lateral walks with TB             Gait Training                                       Modalities                                         1:1 with PT from 815-0q

## 2024-11-18 ENCOUNTER — OFFICE VISIT (OUTPATIENT)
Dept: PHYSICAL THERAPY | Facility: CLINIC | Age: 44
End: 2024-11-18
Payer: COMMERCIAL

## 2024-11-18 DIAGNOSIS — Z98.890 STATUS POST FOOT SURGERY: ICD-10-CM

## 2024-11-18 DIAGNOSIS — R53.81 DEBILITY: ICD-10-CM

## 2024-11-18 DIAGNOSIS — M79.672 LEFT FOOT PAIN: Primary | ICD-10-CM

## 2024-11-18 PROCEDURE — 97112 NEUROMUSCULAR REEDUCATION: CPT | Performed by: PHYSICAL THERAPIST

## 2024-11-18 PROCEDURE — 97110 THERAPEUTIC EXERCISES: CPT | Performed by: PHYSICAL THERAPIST

## 2024-11-18 PROCEDURE — 97140 MANUAL THERAPY 1/> REGIONS: CPT | Performed by: PHYSICAL THERAPIST

## 2024-11-18 NOTE — PROGRESS NOTES
"Daily Note     Today's date: 2024  Patient name: Sonja Parrish  : 1980  MRN: [id number]  Referring provider: Varinder Carbajal DPM  Dx:   Encounter Diagnosis     ICD-10-CM    1. Left foot pain  M79.672       2. Debility  R53.81       3. Status post foot surgery  Z98.890                      Subjective: Pt reports having a busy weekend and noticed increased swelling and pain both Saturday and . Pain was even present with prolonged sitting. Admits to doing chores on Friday.       Objective: See treatment diary below      Assessment: We continued to discuss load progression with patient. Patient should consider monitoring her steps in per day and maybe consider utilizing 1 crutch to get around if her symptoms increase and she needs to get tasks done around the house. Modified her plan of care to reduce weight bearing activity. Exercises tolerated well. Pt still challenged with foot intrinsic exercises. Tolerated treatment fair. Patient would benefit from continued PT.      Plan: Continue per plan of care.      Precautions: Anxiety, s/p foot  surgery on 24      Manuals 10/31 11/7 11/12 11/15 11/18        Left TC joint mobs   JK JK JK JK        Left STJ  mobs   JK JK JK JK                                  Neuro Re-Ed             SLS Nt   4x10\" with FT support  6x10\" with FT support         B wobble board (AP/PA)  Seated x30 ea            Standing BAPS for ROM              Soleus heel raise  Seated 3x10 with 15# KB           Toe yoga   15x3\"           Short foot  15x3\"            Toe abduction      30x         Great toe flexion into TB   3x10 rtb 3x10 rtb 3x10 rtb        Ther Ex             Bicycle  8' L1 8' L1 8\" l3 8' L3 8' L3        Gastroc stretch with strap 3x30\"  4x30\" SB 4x30\"  SB 10x10\"  Towel 4x30\"        Soleus stretch with strap 3x30\"  4x30\"    Towel 4x30\"        4 way ankle TB Red TB 3x10 ea  Red TB 3x10 ea  Red TB 3x10 ea  Red TB 3x10 ea  Red TB 3x10 ea                               "                               Ther Activity             Seated HR/TR X30 ea   Standing HR 2x10  Standing HR 2x10  Seated 30x        Mini squats             Leg press              Lateral walks with TB             Gait Training                                       Modalities                                         1:1 with PT from [id number]

## 2024-11-21 ENCOUNTER — OFFICE VISIT (OUTPATIENT)
Dept: PHYSICAL THERAPY | Facility: CLINIC | Age: 44
End: 2024-11-21
Payer: COMMERCIAL

## 2024-11-21 DIAGNOSIS — R53.81 DEBILITY: ICD-10-CM

## 2024-11-21 DIAGNOSIS — Z98.890 STATUS POST FOOT SURGERY: ICD-10-CM

## 2024-11-21 DIAGNOSIS — M79.672 LEFT FOOT PAIN: Primary | ICD-10-CM

## 2024-11-21 PROCEDURE — 97110 THERAPEUTIC EXERCISES: CPT | Performed by: PHYSICAL THERAPIST

## 2024-11-21 PROCEDURE — 97112 NEUROMUSCULAR REEDUCATION: CPT | Performed by: PHYSICAL THERAPIST

## 2024-11-21 PROCEDURE — 97140 MANUAL THERAPY 1/> REGIONS: CPT | Performed by: PHYSICAL THERAPIST
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
## 2024-11-22 ENCOUNTER — OFFICE VISIT (OUTPATIENT)
Dept: FAMILY MEDICINE CLINIC | Facility: CLINIC | Age: 44
End: 2024-11-22
Payer: COMMERCIAL

## 2024-11-22 VITALS
HEIGHT: 62 IN | WEIGHT: 207 LBS | BODY MASS INDEX: 38.09 KG/M2 | TEMPERATURE: 97.5 F | DIASTOLIC BLOOD PRESSURE: 88 MMHG | SYSTOLIC BLOOD PRESSURE: 116 MMHG | HEART RATE: 104 BPM | OXYGEN SATURATION: 97 %

## 2024-11-22 DIAGNOSIS — E66.09 CLASS 2 OBESITY DUE TO EXCESS CALORIES WITHOUT SERIOUS COMORBIDITY WITH BODY MASS INDEX (BMI) OF 37.0 TO 37.9 IN ADULT: Primary | ICD-10-CM

## 2024-11-22 DIAGNOSIS — Z12.11 SCREEN FOR COLON CANCER: ICD-10-CM

## 2024-11-22 DIAGNOSIS — E66.812 CLASS 2 OBESITY DUE TO EXCESS CALORIES WITHOUT SERIOUS COMORBIDITY WITH BODY MASS INDEX (BMI) OF 37.0 TO 37.9 IN ADULT: Primary | ICD-10-CM

## 2024-11-22 DIAGNOSIS — Z12.39 ENCOUNTER FOR SCREENING FOR MALIGNANT NEOPLASM OF BREAST, UNSPECIFIED SCREENING MODALITY: ICD-10-CM

## 2024-11-22 PROCEDURE — 99213 OFFICE O/P EST LOW 20 MIN: CPT | Performed by: FAMILY MEDICINE
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
## 2024-11-25 ENCOUNTER — OFFICE VISIT (OUTPATIENT)
Dept: PHYSICAL THERAPY | Facility: CLINIC | Age: 44
End: 2024-11-25
Payer: COMMERCIAL

## 2024-11-25 ENCOUNTER — OFFICE VISIT (OUTPATIENT)
Dept: PODIATRY | Facility: CLINIC | Age: 44
End: 2024-11-25

## 2024-11-25 ENCOUNTER — TELEPHONE (OUTPATIENT)
Age: 44
End: 2024-11-25

## 2024-11-25 VITALS
HEIGHT: 62 IN | SYSTOLIC BLOOD PRESSURE: 128 MMHG | BODY MASS INDEX: 37.91 KG/M2 | WEIGHT: 206 LBS | HEART RATE: 79 BPM | DIASTOLIC BLOOD PRESSURE: 90 MMHG | OXYGEN SATURATION: 97 %

## 2024-11-25 DIAGNOSIS — Q74.2 ACCESSORY NAVICULAR BONE OF FOOT: ICD-10-CM

## 2024-11-25 DIAGNOSIS — R53.81 DEBILITY: ICD-10-CM

## 2024-11-25 DIAGNOSIS — Z98.890 STATUS POST FOOT SURGERY: ICD-10-CM

## 2024-11-25 DIAGNOSIS — Z98.890 STATUS POST FOOT SURGERY: Primary | ICD-10-CM

## 2024-11-25 DIAGNOSIS — M79.672 LEFT FOOT PAIN: Primary | ICD-10-CM

## 2024-11-25 PROCEDURE — 97112 NEUROMUSCULAR REEDUCATION: CPT | Performed by: PHYSICAL THERAPIST

## 2024-11-25 PROCEDURE — 97110 THERAPEUTIC EXERCISES: CPT | Performed by: PHYSICAL THERAPIST

## 2024-11-25 PROCEDURE — 97140 MANUAL THERAPY 1/> REGIONS: CPT | Performed by: PHYSICAL THERAPIST

## 2024-11-25 PROCEDURE — 99024 POSTOP FOLLOW-UP VISIT: CPT | Performed by: PODIATRIST
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
## 2024-11-25 NOTE — TELEPHONE ENCOUNTER
Patient called in stating she spoke with insurance company and they cover zepbound/ moujaro and wegovy/ozempic. Patient asked if one of them can be ordered. Patient asked if she can get a call back when one of them is ordered to go over when she needs next appointment for weight check.    Please advise, thank you
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
## 2024-11-27 NOTE — PROGRESS NOTES
[id number]    Today's date: 2024  Patient name: Sonja Parrish  : 1980  MRN: [id number]  Referring provider: Varinder Carbajal DPM  Dx:   Encounter Diagnosis     ICD-10-CM    1. Left foot pain  M79.672       2. Debility  R53.81       3. Status post foot surgery  Z98.890                        Assessment  Impairments: abnormal gait, abnormal or restricted ROM, abnormal movement, activity intolerance, impaired physical strength, lacks appropriate home exercise program and pain with function  Functional limitations: ambulation, stair negotiation, squatting, transfers, weight bearing ADLs.    Assessment details: Sonja is s/p 11.5 weeks excision of an accessory navicular bone from her left foot with Dr. Carbajal on 24 (Galloner procedure).  Patient is doing well post operatively despite having soreness, tenderness, and weight bearing intolerance. Pt has demonstrated considerable improvements in ROM at involved ankle. Swelling gradually reducing. Strength and stability of ankle is also improving. Pt still challenged with weight bearing activities and single leg balance. She notes increased symptoms with prolonged standing/walking, especially in the medial and anterior ankle. Overall, patient is progressing appropriately. Recommend she continue to limit weight bearing activities and respecting her pain tolerance. Sonja would benefit from skilled physical therapy at this time to improve function, reduce pain, increase ROM, increase strength, and return to premorbid function.     Understanding of Dx/Px/POC: good     Prognosis: good    Goals  Short Term Goals: to be achieved by 4 weeks  1) Patient to be independent with basic HEP.-MET  2) Decrease pain to 3/10 at its worst.-Partially met   3) Increase left ankle ROM by 5-10 degrees -Partially met  4) Increase LE strength by 1/2 MMT grade in all deficient planes.-Partially met    Long Term Goals: to be achieved by discharge  1) FOTO equal to or greater than  expected.-Partially met  2) Ambulation to improve to maximal level of function-Partially met  3) Stair negotiation will improve to reciprocal.-Partially met  4) Pt to participate in age appropriate activities without pain-Partially met    Plan  Patient would benefit from: skilled PT  Planned modality interventions: cryotherapy and TENS    Planned therapy interventions: ADL training, manual therapy, motor coordination training, neuromuscular re-education, therapeutic activities, therapeutic exercise, gait training and functional ROM exercises    Frequency: 2x per week for 4-6 weeks.  Treatment plan discussed with: patient        Subjective Evaluation    History of Present Illness  Date of surgery: 9/13/2024  Mechanism of injury: surgery  Mechanism of injury: History of Current Injury: Pt is 6.5 weeks s/p excision of accessory navicular bone of left foot. Pt is no longer in splint/brace and currently wearing HOKAs and orthotics (for 2 weeks now). Pt previously had bunionectomy in both feet (~17 to 20 years ago). Pt feels she is progressing. She continues to have discomfort at medial foot/ankle. She reports worsening swelling when on her feet all day. She did just return from vacation (football game at [facility]) and was on a plane.  She knows her walking gait needs improvement as she feels restricted at involved foot/ankle.  Pain location/Descriptors: Dull pain at medial aspect of ankle and around the ankle of left foot.   Aggravating factors: Weight bearing activities, prolonged standing, walking.   Easing factors: Advil  24 HR pattern: Worse with activity.   Imaging: XR:  IMPRESSION:  Redemonstrated postop changes of left bunionectomy with metallic screw fixation across a healed/healing first metatarsal neck osteotomy and metallic wire fixation along the lateral margin of the left great toe proximal phalanx.    Special Questions: Numbness present over distal tibia of left foot which is improving.   Patient  goals:  [place] obstacle course on May 31st. Would like to return to walk and exercise.   Hobbies/Interest: Try to regularly exercise but hasn't since the injury   Occupation:       Patient Goals  Patient goals for therapy: decreased pain, decreased edema, improved balance, increased motion, increased strength and independence with ADLs/IADLs    Pain  Current pain rating: 3  At worst pain ratin    Treatments  No previous or current treatments        Objective     Observations   Left Ankle/Foot   Positive for edema, effusion and incision.     Additional Observation Details  Incision intact     Active Range of Motion   Left Ankle/Foot   Dorsiflexion (ke): 9 degrees   Plantar flexion: 68 degrees   Inversion: 30 degrees   Eversion: 15 degrees     Right Ankle/Foot   Dorsiflexion (ke): 10 degrees   Plantar flexion: 70 degrees   Inversion: 22 degrees   Eversion: 5 degrees     Passive Range of Motion   Left Hip   Normal passive range of motion    Right Hip   Normal passive range of motion    Strength/Myotome Testing     Left Hip   Planes of Motion   Flexion: 5  Extension: 5  Abduction: 5    Right Hip   Planes of Motion   Flexion: 5  Extension: 5  Abduction: 5    Left Knee   Flexion: 5  Extension: 5    Right Knee   Flexion: 5  Extension: 5    Left Ankle/Foot   Dorsiflexion: 4  Plantar flexion: 4  Inversion: 4  Eversion: 4    Right Ankle/Foot   Normal strength    Tests     Additional Tests Details  Moderate restriction in hamstrings and gastroc/soleus complex on L  TC joint mobility deficit on left       Swelling   Left Ankle/Foot   Metatarsal heads: 21.5 cm  Figure 8: 49 cm  Malleoli: 23.5 cm    Ambulation     Observational Gait   Gait: antalgic   Decreased walking speed and stride length.     Additional Observational Gait Details  Shod gait:  (Hokas with orthotics)   Increased WIL, decreased terminal stance and push off GT on the left        Precautions: Anxiety, s/p foot  surgery on  "9/13/24      Manuals 10/31 11/7 11/12 11/15 11/18 11/21 11/25 12/2       Left TC joint mobs   JK RHETTK RHETTK ANA M DELANEYK RHETTK JK     Left STJ  mobs   RHETTK ANA M VIRAMONTES JK                               Neuro Re-Ed             SLS Nt   4x10\" with FT support  6x10\" with FT support   10x10\" with FT support 10x10\" with FT support     B wobble board (AP/PA)  Seated x30 ea            Standing BAPS for ROM              Soleus heel raise  Seated 3x10 with 15# KB           Toe yoga   15x3\"    30x3\"       Short foot  15x3\"     30x3\"       Toe abduction      30x         Great toe flexion into TB   3x10 rtb 3x10 rtb 3x10 rtb 3x10 rtb 3x10 rtb      BAPS        10x a/p, 10x m/l     Ther Ex             Bicycle  8' L1 8' L1 8\" l3 8' L3 8' L3 8' L3 8' L3 8' L3     Gastroc stretch with strap 3x30\"  4x30\" SB 4x30\"  SB 10x10\"  Towel 4x30\" Towel 4x30\" SB 10x10\" SB 4x30\"      Soleus stretch with strap 3x30\"  4x30\"    Towel 4x30\" Towel 4x30\"       4 way ankle TB Red TB 3x10 ea  Red TB 3x10 ea  Red TB 3x10 ea  Red TB 3x10 ea  Red TB 3x10 ea  Red TB 3x10 ea  Red TB 3x10 ea                                                           Ther Activity             Seated HR/TR X30 ea   Standing HR 2x10  Standing HR 2x10  Seated 30x 3x10 standing   3x10 standing 3x10 standing     Step up       6\" 2x10  6\" 2x10      Mini squats             Leg press              Lateral walks with TB       4x15 ft rtb  4x20 ft rtb      Gait Training                                       Modalities                                         1:1 with PT from 330-415pm                   "

## 2024-12-02 ENCOUNTER — EVALUATION (OUTPATIENT)
Dept: PHYSICAL THERAPY | Facility: CLINIC | Age: 44
End: 2024-12-02
Payer: COMMERCIAL

## 2024-12-02 DIAGNOSIS — Z98.890 STATUS POST FOOT SURGERY: ICD-10-CM

## 2024-12-02 DIAGNOSIS — M79.672 LEFT FOOT PAIN: Primary | ICD-10-CM

## 2024-12-02 DIAGNOSIS — R53.81 DEBILITY: ICD-10-CM

## 2024-12-02 PROCEDURE — 97112 NEUROMUSCULAR REEDUCATION: CPT | Performed by: PHYSICAL THERAPIST

## 2024-12-02 PROCEDURE — 97110 THERAPEUTIC EXERCISES: CPT | Performed by: PHYSICAL THERAPIST

## 2024-12-02 PROCEDURE — 97140 MANUAL THERAPY 1/> REGIONS: CPT | Performed by: PHYSICAL THERAPIST

## 2024-12-03 ENCOUNTER — TELEPHONE (OUTPATIENT)
Age: 44
End: 2024-12-03

## 2024-12-03 NOTE — TELEPHONE ENCOUNTER
She is not on Ozempic it was changed to Wegovy. So will send to prior auth team to initiate auth.

## 2024-12-03 NOTE — TELEPHONE ENCOUNTER
Sunshine from [facility] called to say that she is providing a code to access cover my meds regarding pt's ozempic. The code is [id number]. She is also going to be faxing this code over as well.

## 2024-12-04 NOTE — TELEPHONE ENCOUNTER
PA for Wegovy 0.25 mg EXCLUDED from plan       Reason:(Screenshot if applicable)        Message sent to office clinical pool Yes

## 2024-12-04 NOTE — TELEPHONE ENCOUNTER
PA for Wegovy 0.25 mg/0.5 ml SUBMITTED to [facility]    via    []CMM-KEY:   [x]Surescripts-Case ID # [id number]   []Availity-Auth ID # NDC #   []Faxed to plan   []Other website   []Phone call Case ID #     [x]PA sent as URGENT    All office notes, labs and other pertaining documents and studies sent. Clinical questions answered. Awaiting determination from insurance company.     Turnaround time for your insurance to make a decision on your Prior Authorization can take 7-21 business days.

## 2024-12-05 ENCOUNTER — OFFICE VISIT (OUTPATIENT)
Dept: PHYSICAL THERAPY | Facility: CLINIC | Age: 44
End: 2024-12-05
Payer: COMMERCIAL

## 2024-12-05 DIAGNOSIS — R53.81 DEBILITY: ICD-10-CM

## 2024-12-05 DIAGNOSIS — Z98.890 STATUS POST FOOT SURGERY: ICD-10-CM

## 2024-12-05 DIAGNOSIS — M79.672 LEFT FOOT PAIN: Primary | ICD-10-CM

## 2024-12-05 PROCEDURE — 97140 MANUAL THERAPY 1/> REGIONS: CPT | Performed by: PHYSICAL THERAPIST

## 2024-12-05 PROCEDURE — 97110 THERAPEUTIC EXERCISES: CPT | Performed by: PHYSICAL THERAPIST

## 2024-12-05 PROCEDURE — 97530 THERAPEUTIC ACTIVITIES: CPT | Performed by: PHYSICAL THERAPIST

## 2024-12-05 NOTE — PROGRESS NOTES
"Daily Note     Today's date: 2024  Patient name: Sonja Parrish  : 1980  MRN: [id number]  Referring provider: Varinder Carbajal DPM  Dx:   Encounter Diagnosis     ICD-10-CM    1. Left foot pain  M79.672       2. Debility  R53.81       3. Status post foot surgery  Z98.890                      Subjective: Pt reports having more soreness on the medial aspect of foot and ankle. Symptoms started the night before last and yesterday wasn't great. She noticed the improved ability to descend stairs this week so she performed more stairs regularly.      Objective: See treatment diary below      Assessment: Pt likely experiencing DOMS. Pt will be 12 weeks tomorrow. Discussed slow progression of activities using green (0-3), yellow (4-6), red light (7-10) analogy.  Continued with current plan of care with modification. Adjusted to seated HR and seated BAPS board due to discomfort.  Tolerated treatment fair. Implemented IASTM to medial ankle to reduce sensitivity. No progression of HEP given secondary to discomfort. Patient exhibited good technique with therapeutic exercises and would benefit from continued PT.       Plan: Continue per plan of care.      Precautions: Anxiety, s/p foot  surgery on 24      Manuals 10/31 11/7 11/12 11/15 11/18 11/21 11/25 12/2   12/5    Left TC joint mobs   JK JK JK JK JK JK JK JK    Left STJ  mobs   JK JK JK JK JK JK JK JK    IASTM to medial ankle and tibia along PTT         JK                  Neuro Re-Ed             SLS Nt   4x10\" with FT support  6x10\" with FT support   10x10\" with FT support 10x10\" with FT support 10x10\" with FT support    B wobble board (AP/PA)  Seated x30 ea            Standing BAPS for ROM              Soleus heel raise  Seated 3x10 with 15# KB           Toe yoga   15x3\"    30x3\"       Short foot  15x3\"     30x3\"       Toe abduction      30x         Great toe flexion into TB   3x10 rtb 3x10 rtb 3x10 rtb 3x10 rtb 3x10 rtb      BAPS        10x a/p, 10x m/l " "10x a/p, 10x m/l seated    Ther Ex             Bicycle  8' L1 8' L1 8\" l3 8' L3 8' L3 8' L3 8' L3 8' L3 8' L3    Gastroc stretch with strap 3x30\"  4x30\" SB 4x30\"  SB 10x10\"  Towel 4x30\" Towel 4x30\" SB 10x10\" SB 4x30\"  SB 4x30\"     Soleus stretch with strap 3x30\"  4x30\"    Towel 4x30\" Towel 4x30\"       4 way ankle TB Red TB 3x10 ea  Red TB 3x10 ea  Red TB 3x10 ea  Red TB 3x10 ea  Red TB 3x10 ea  Red TB 3x10 ea  Red TB 3x10 ea                                                           Ther Activity             Seated HR/TR X30 ea   Standing HR 2x10  Standing HR 2x10  Seated 30x 3x10 standing   3x10 standing 3x10 standing Seated 30x     Step up       6\" 2x10  6\" 2x10  6\" 2x10     Mini squats             Leg press              Lateral walks with TB       4x15 ft rtb  4x20 ft rtb  4x20 ft rtb     Gait Training                                       Modalities                                         1:1 with PT from 9663-1131pm                     "

## 2024-12-05 NOTE — TELEPHONE ENCOUNTER
Pt came in for an update. Per clinical, Wegovy Modesta is excluded from her plan, and advised that she should call her insurance to see if Zepbound would be covered. Advised that Ozempic is not covered because she is not a type 2 diabetic.

## 2024-12-09 ENCOUNTER — OFFICE VISIT (OUTPATIENT)
Dept: PHYSICAL THERAPY | Facility: CLINIC | Age: 44
End: 2024-12-09
Payer: COMMERCIAL

## 2024-12-09 DIAGNOSIS — Z98.890 STATUS POST FOOT SURGERY: ICD-10-CM

## 2024-12-09 DIAGNOSIS — M79.672 LEFT FOOT PAIN: Primary | ICD-10-CM

## 2024-12-09 DIAGNOSIS — R53.81 DEBILITY: ICD-10-CM

## 2024-12-09 PROCEDURE — 97140 MANUAL THERAPY 1/> REGIONS: CPT | Performed by: PHYSICAL THERAPIST

## 2024-12-09 PROCEDURE — 97110 THERAPEUTIC EXERCISES: CPT | Performed by: PHYSICAL THERAPIST

## 2024-12-09 PROCEDURE — 97112 NEUROMUSCULAR REEDUCATION: CPT | Performed by: PHYSICAL THERAPIST

## 2024-12-12 ENCOUNTER — APPOINTMENT (OUTPATIENT)
Dept: PHYSICAL THERAPY | Facility: CLINIC | Age: 44
End: 2024-12-12
Payer: COMMERCIAL

## 2024-12-13 ENCOUNTER — OFFICE VISIT (OUTPATIENT)
Dept: PHYSICAL THERAPY | Facility: CLINIC | Age: 44
End: 2024-12-13
Payer: COMMERCIAL

## 2024-12-13 DIAGNOSIS — R53.81 DEBILITY: ICD-10-CM

## 2024-12-13 DIAGNOSIS — M79.672 LEFT FOOT PAIN: Primary | ICD-10-CM

## 2024-12-13 PROCEDURE — 97140 MANUAL THERAPY 1/> REGIONS: CPT | Performed by: PHYSICAL THERAPIST

## 2024-12-13 PROCEDURE — 97110 THERAPEUTIC EXERCISES: CPT | Performed by: PHYSICAL THERAPIST

## 2024-12-13 PROCEDURE — 97530 THERAPEUTIC ACTIVITIES: CPT | Performed by: PHYSICAL THERAPIST

## 2024-12-13 NOTE — PROGRESS NOTES
"Daily Note     Today's date: 2024  Patient name: Sonja Parrish  : 1980  MRN: [id number]  Referring provider: Varinder Carbajal DPM  Dx:   Encounter Diagnosis     ICD-10-CM    1. Left foot pain  M79.672       2. Debility  R53.81                      Subjective: Pt reports having symptoms in the \"red zone\" last night but symptoms were low this morning.       Objective: See treatment diary below      Assessment: Implemented adduction isometric during HR. Ankle/foot irritability improving. Discussed custom made orthotics with patient. Tolerated treatment well. Pt will continue with HEP and monitor irritability and pain severity levels. Overall, proprioception, motor control, and strength improving. Patient exhibited good technique with therapeutic exercises and would benefit from continued PT.      Plan: Continue per plan of care.      Precautions: Anxiety, s/p foot  surgery on 24      Manuals 12/12   11/15 11/18 11/21 11/25 12/2   12/5 12/9   Left TC joint mobs JK   JK JK JK JK JK JK JK   Left STJ  mobs JK   JK JK JK JK JK JK JK   IASTM to medial ankle and tibia along PTT JK        JK  JK                Neuro Re-Ed             SLS 10x10\" with FT support   6x10\" with FT support   10x10\" with FT support 10x10\" with FT support 10x10\" with FT support 10x10\" with FT support   B wobble board (AP/PA)              Standing BAPS for ROM              Soleus heel raise             Toe yoga       30x3\"       Short foot      30x3\"       Toe abduction      30x         Great toe flexion into TB    3x10 rtb 3x10 rtb 3x10 rtb 3x10 rtb      Wobble board 3x30\" AP/ ML         3x30\" AP/ ML   BAPS        10x a/p, 10x m/l 10x a/p, 10x m/l seated    Ther Ex             Bicycle  8' L3   8' L3 8' L3 8' L3 8' L3 8' L3 8' L3 8' L3   Gastroc stretch with strap    SB 10x10\"  Towel 4x30\" Towel 4x30\" SB 10x10\" SB 4x30\"  SB 4x30\"     Soleus stretch with strap     Towel 4x30\" Towel 4x30\"       4 way ankle TB    Red TB 3x10 ea  Red TB " "3x10 ea  Red TB 3x10 ea  Red TB 3x10 ea                                                           Ther Activity             Seated HR/TR 1x10 standing 2x10 with adduction MB squeeze   Standing HR 2x10  Seated 30x 3x10 standing   3x10 standing 3x10 standing Seated 30x  3x10 standing   Step up 8\" 2x10       6\" 2x10  6\" 2x10  6\" 2x10     Mini squats             Leg press              Lateral walks with TB 4x20ft rtb       4x15 ft rtb  4x20 ft rtb  4x20 ft rtb  4x20 ft   Gait Training                                       Modalities                                         1:1 with PT from 046-879p                          "

## 2024-12-16 ENCOUNTER — OFFICE VISIT (OUTPATIENT)
Dept: PHYSICAL THERAPY | Facility: CLINIC | Age: 44
End: 2024-12-16
Payer: COMMERCIAL

## 2024-12-16 DIAGNOSIS — M79.672 LEFT FOOT PAIN: Primary | ICD-10-CM

## 2024-12-16 DIAGNOSIS — R53.81 DEBILITY: ICD-10-CM

## 2024-12-16 DIAGNOSIS — Z98.890 STATUS POST FOOT SURGERY: ICD-10-CM

## 2024-12-16 PROCEDURE — 97110 THERAPEUTIC EXERCISES: CPT | Performed by: PHYSICAL THERAPIST

## 2024-12-16 PROCEDURE — 97140 MANUAL THERAPY 1/> REGIONS: CPT | Performed by: PHYSICAL THERAPIST

## 2024-12-16 PROCEDURE — 97530 THERAPEUTIC ACTIVITIES: CPT | Performed by: PHYSICAL THERAPIST

## 2024-12-16 NOTE — PROGRESS NOTES
"Daily Note     Today's date: 2024  Patient name: Sonja Parrish  : 1980  MRN: [id number]  Referring provider: Varinder Carbajal DPM  Dx:   Encounter Diagnosis     ICD-10-CM    1. Left foot pain  M79.672       2. Debility  R53.81       3. Status post foot surgery  Z98.890                      Subjective: Pt reports having an ache in her achilles on Friday which subsided by Saturday. She notes more discomfort in foot/ankle on  but doing well today.       Objective: See treatment diary below      Assessment: Tolerated treatment well. Implemented eccentric lowering during heel raise. SLB improving although postural sway still present medially and laterally. Pt continues to be challenged with prescribed exercises.  Patient exhibited good technique with therapeutic exercises and would benefit from continued PT      Plan: Continue per plan of care.      Precautions: Anxiety, s/p foot  surgery on 24      Manuals 12/12 12/16  11/15 11/18 11/21 11/25 12/2   12/5 12/9   Left TC joint mobs JK JK  JK JK JK JK JK JK JK   Left STJ  mobs JK JK  JK JK JK JK JK JK JK   IASTM to medial ankle and tibia along PTT JK JK       JK  JK                Neuro Re-Ed             SLS 10x10\" with FT support 10x10\" with FT support  6x10\" with FT support   10x10\" with FT support 10x10\" with FT support 10x10\" with FT support 10x10\" with FT support   B wobble board (AP/PA)              Standing BAPS for ROM              Soleus heel raise             Toe yoga       30x3\"       Short foot      30x3\"       Toe abduction      30x         Great toe flexion into TB    3x10 rtb 3x10 rtb 3x10 rtb 3x10 rtb      Wobble board 3x30\" AP/ ML 3x30\" AP/ ML        3x30\" AP/ ML   BAPS        10x a/p, 10x m/l 10x a/p, 10x m/l seated    Ther Ex             Bicycle  8' L3 8' L3  8' L3 8' L3 8' L3 8' L3 8' L3 8' L3 8' L3   Gastroc stretch with strap    SB 10x10\"  Towel 4x30\" Towel 4x30\" SB 10x10\" SB 4x30\"  SB 4x30\"     Soleus stretch with strap     " "Towel 4x30\" Towel 4x30\"       4 way ankle TB    Red TB 3x10 ea  Red TB 3x10 ea  Red TB 3x10 ea  Red TB 3x10 ea                                                           Ther Activity             Eccentric heel lowering on leg press  3x10 30# DL up/SL down to neutral            Seated HR/TR 1x10 standing 2x10 with adduction MB squeeze   Standing HR 2x10  Seated 30x 3x10 standing   3x10 standing 3x10 standing Seated 30x  3x10 standing   Step up 8\" 2x10  2x10 on BOSU with FT support      6\" 2x10  6\" 2x10  6\" 2x10     Mini squats             Leg press              Lateral walks with TB 4x20ft rtb       4x15 ft rtb  4x20 ft rtb  4x20 ft rtb  4x20 ft   Gait Training                                       Modalities                                         1:1 with PT from 1015-11a                             "

## 2024-12-19 ENCOUNTER — OFFICE VISIT (OUTPATIENT)
Dept: PHYSICAL THERAPY | Facility: CLINIC | Age: 44
End: 2024-12-19
Payer: COMMERCIAL

## 2024-12-19 DIAGNOSIS — M79.672 LEFT FOOT PAIN: Primary | ICD-10-CM

## 2024-12-19 DIAGNOSIS — R53.81 DEBILITY: ICD-10-CM

## 2024-12-19 DIAGNOSIS — Z98.890 STATUS POST FOOT SURGERY: ICD-10-CM

## 2024-12-19 PROCEDURE — 97530 THERAPEUTIC ACTIVITIES: CPT | Performed by: PHYSICAL THERAPIST

## 2024-12-19 PROCEDURE — 97110 THERAPEUTIC EXERCISES: CPT | Performed by: PHYSICAL THERAPIST

## 2024-12-19 PROCEDURE — 97140 MANUAL THERAPY 1/> REGIONS: CPT | Performed by: PHYSICAL THERAPIST

## 2024-12-19 NOTE — PROGRESS NOTES
"Daily Note     Today's date: 2024  Patient name: Sonja Parrish  : 1980  MRN: [id number]  Referring provider: Varinder Carbajal DPM  Dx:   Encounter Diagnosis     ICD-10-CM    1. Left foot pain  M79.672       2. Debility  R53.81       3. Status post foot surgery  Z98.890                      Subjective: Pt continues to report improvement in symptoms, despite progression of exercises last session.       Objective: See treatment diary below      Assessment: Continued to progress program without adverse effects. Tolerated treatment well. Patient exhibited good technique with therapeutic exercises and would benefit from continued PT      Plan: Continue per plan of care.      Precautions: Anxiety, s/p foot  surgery on 24      Manuals    Left TC joint mobs JK JK JK   JK JK JK JK JK   Left STJ  mobs JK JK JK   JK JK JK JK JK   IASTM to medial ankle and tibia along PTT JK JK JK      JK  JK                Neuro Re-Ed             SLS 10x10\" with FT support 10x10\" with FT support 10x10\" with FT support    10x10\" with FT support 10x10\" with FT support 10x10\" with FT support 10x10\" with FT support   SLS on foam    10x10\" with FT support          B wobble board (AP/PA)              Standing BAPS for ROM              Soleus heel raise             Toe yoga       30x3\"       Short foot      30x3\"       Toe abduction              Great toe flexion into TB      3x10 rtb 3x10 rtb      Wobble board 3x30\" AP/ ML 3x30\" AP/ ML 3x30\" AP/ ML       3x30\" AP/ ML   BAPS        10x a/p, 10x m/l 10x a/p, 10x m/l seated    Ther Ex             Bicycle  8' L3 8' L3 8' L3   8' L3 8' L3 8' L3 8' L3 8' L3   Gastroc stretch with strap      Towel 4x30\" SB 10x10\" SB 4x30\"  SB 4x30\"     Soleus stretch with strap      Towel 4x30\"       4 way ankle TB      Red TB 3x10 ea  Red TB 3x10 ea                                                           Ther Activity             SLS with cone taps    Opp " "toe down 2x10           Eccentric heel lowering on leg press  3x10 30# DL up/SL down to neutral  Standing over foam 2x10           Seated HR/TR 1x10 standing 2x10 with adduction MB squeeze     3x10 standing   3x10 standing 3x10 standing Seated 30x  3x10 standing   Step up 8\" 2x10  2x10 on BOSU with FT support  2x10 on BOSU with FT suppor    6\" 2x10  6\" 2x10  6\" 2x10     Mini squats             Leg press              Lateral walks with TB 4x20ft rtb       4x15 ft rtb  4x20 ft rtb  4x20 ft rtb  4x20 ft   Gait Training                                       Modalities                                         1:1 with PT from 423-501pm                               "

## 2024-12-23 ENCOUNTER — OFFICE VISIT (OUTPATIENT)
Dept: PHYSICAL THERAPY | Facility: CLINIC | Age: 44
End: 2024-12-23
Payer: COMMERCIAL

## 2024-12-23 DIAGNOSIS — M79.672 LEFT FOOT PAIN: Primary | ICD-10-CM

## 2024-12-23 DIAGNOSIS — Z98.890 STATUS POST FOOT SURGERY: ICD-10-CM

## 2024-12-23 DIAGNOSIS — R53.81 DEBILITY: ICD-10-CM

## 2024-12-23 PROCEDURE — 97530 THERAPEUTIC ACTIVITIES: CPT | Performed by: PHYSICAL THERAPIST

## 2024-12-23 PROCEDURE — 97140 MANUAL THERAPY 1/> REGIONS: CPT | Performed by: PHYSICAL THERAPIST

## 2024-12-23 NOTE — PROGRESS NOTES
"Daily Note     Today's date: 2024  Patient name: Sonja Parrish  : 1980  MRN: [id number]  Referring provider: Varinder Carbajal DPM  Dx:   Encounter Diagnosis     ICD-10-CM    1. Left foot pain  M79.672       2. Debility  R53.81       3. Status post foot surgery  Z98.890                      Subjective: Pt reports having an active weekend; however, has minimal foot/ankle pain.       Objective: See treatment diary below      Assessment: Tolerated treatment well. Progressed POC with SL HR on leg press and toe walks. Patient challenged with toe walks and SLS dynamic balance activities. Despite functional challenge, no considerable increase of pain. Patient exhibited good technique with therapeutic exercises and would benefit from continued PT.       Plan: Continue per plan of care.      Precautions: Anxiety, s/p foot  surgery on 24      Manuals    Left TC joint mobs JK JK JK JK  JK JK JK JK JK   Left STJ  mobs JK JK JK JK  JK JK JK JK JK   IASTM to medial ankle and tibia along PTT JK JK JK JK     JK  JK                Neuro Re-Ed             SLS 10x10\" with FT support 10x10\" with FT support 10x10\" with FT support    10x10\" with FT support 10x10\" with FT support 10x10\" with FT support 10x10\" with FT support   SLS on foam    10x10\" with FT support 10x10\" with FT support         B wobble board (AP/PA)              Standing BAPS for ROM              Soleus heel raise             Toe yoga       30x3\"       Short foot      30x3\"       Toe abduction              Great toe flexion into TB      3x10 rtb 3x10 rtb      Wobble board 3x30\" AP/ ML 3x30\" AP/ ML 3x30\" AP/ ML 2x30\" AP/ML      3x30\" AP/ ML   BAPS        10x a/p, 10x m/l 10x a/p, 10x m/l seated    HR with MB adduction iso    Red MB 2x10          Ther Ex             Bicycle  8' L3 8' L3 8' L3 8\" L3  8' L3 8' L3 8' L3 8' L3 8' L3   Gastroc stretch with strap      Towel 4x30\" SB 10x10\" SB 4x30\"  SB 4x30\"   " "  Soleus stretch with strap      Towel 4x30\"       4 way ankle TB      Red TB 3x10 ea  Red TB 3x10 ea                                                           Ther Activity             SLS with cone taps    Opp toe down 2x10  Opp toe down 2x10          Eccentric heel lowering on leg press  3x10 30# DL up/SL down to neutral  Standing over foam 2x10           Seated HR/TR 1x10 standing 2x10 with adduction MB squeeze     3x10 standing   3x10 standing 3x10 standing Seated 30x  3x10 standing   Step up 8\" 2x10  2x10 on BOSU with FT support  2x10 on BOSU with FT suppor 2x10 on BOSU with FT support   6\" 2x10  6\" 2x10  6\" 2x10     Mini squats             Leg press     SL HR 30# 2x10         Toe walks/Heel walks    Length of bar 4x20 feet ea                      Lateral walks with TB 4x20ft rtb    4x20 ft rtb    4x15 ft rtb  4x20 ft rtb  4x20 ft rtb  4x20 ft   Gait Training                                       Modalities                                         1:1 with PT from 446-510pm                                 "

## 2024-12-30 ENCOUNTER — APPOINTMENT (OUTPATIENT)
Dept: PHYSICAL THERAPY | Facility: CLINIC | Age: 44
End: 2024-12-30
Payer: COMMERCIAL

## 2025-01-06 ENCOUNTER — APPOINTMENT (OUTPATIENT)
Dept: PHYSICAL THERAPY | Facility: CLINIC | Age: 45
End: 2025-01-06
Payer: COMMERCIAL

## 2025-01-09 ENCOUNTER — OFFICE VISIT (OUTPATIENT)
Dept: PODIATRY | Facility: CLINIC | Age: 45
End: 2025-01-09
Payer: COMMERCIAL

## 2025-01-09 ENCOUNTER — OFFICE VISIT (OUTPATIENT)
Dept: PHYSICAL THERAPY | Facility: CLINIC | Age: 45
End: 2025-01-09
Payer: COMMERCIAL

## 2025-01-09 VITALS
OXYGEN SATURATION: 98 % | HEIGHT: 62 IN | DIASTOLIC BLOOD PRESSURE: 99 MMHG | WEIGHT: 202 LBS | BODY MASS INDEX: 37.17 KG/M2 | SYSTOLIC BLOOD PRESSURE: 141 MMHG | HEART RATE: 81 BPM

## 2025-01-09 DIAGNOSIS — M79.672 LEFT FOOT PAIN: Primary | ICD-10-CM

## 2025-01-09 DIAGNOSIS — Z98.890 STATUS POST FOOT SURGERY: ICD-10-CM

## 2025-01-09 DIAGNOSIS — R53.81 DEBILITY: ICD-10-CM

## 2025-01-09 DIAGNOSIS — Q74.2 ACCESSORY NAVICULAR BONE OF FOOT: ICD-10-CM

## 2025-01-09 DIAGNOSIS — R53.81 DEBILITY: Primary | ICD-10-CM

## 2025-01-09 PROCEDURE — 97530 THERAPEUTIC ACTIVITIES: CPT | Performed by: PHYSICAL THERAPIST

## 2025-01-09 PROCEDURE — 97140 MANUAL THERAPY 1/> REGIONS: CPT | Performed by: PHYSICAL THERAPIST

## 2025-01-09 PROCEDURE — 99213 OFFICE O/P EST LOW 20 MIN: CPT | Performed by: PODIATRIST

## 2025-01-09 NOTE — PROGRESS NOTES
"Daily Note     Today's date: 2025  Patient name: Sonja Parrish  : 1980  MRN: [id number]  Referring provider: Varinder Carbajal DPM  Dx:   Encounter Diagnosis     ICD-10-CM    1. Left foot pain  M79.672       2. Debility  R53.81       3. Status post foot surgery  Z98.890           Start Time: 1615  Stop Time: 1701  Total time in clinic (min): 46 minutes    Subjective: Pt overall doing well. She was away travel prior to the holiday and admits to walking 10,000 steps one day. She notes discomfort medial foot/ankle but was capable of doing it.       Objective: See treatment diary below      Assessment: Pt is 4 months post op and continues to progress with weight bearing activities. She followed up with Dr. Carbajal this afternoon. HE recommended continuing with PT intervention. Pt still challenged with SL balance. She does not have the same muscle performance in distal LLE vs RLE. Pt unable to perform SL heel raise. Initiated eccentric lower with heel raise to improve this. Tolerated treatment well. Patient demonstrated fatigue post treatment      Plan: Continue per plan of care.      Precautions: Anxiety, s/p foot  surgery on 24      Manuals         Left TC joint mobs JK JK JK JK JK        Left STJ  mobs JK JK JK JK JK        IASTM to medial ankle and tibia along PTT JK JK JK JK JK                     Neuro Re-Ed             SLS 10x10\" with FT support 10x10\" with FT support 10x10\" with FT support          SLS on foam    10x10\" with FT support 10x10\" with FT support 10x10\" with FT support         B wobble board (AP/PA)              Standing BAPS for ROM              Soleus heel raise             Toe yoga              Short foot             Toe abduction              Great toe flexion into TB             Wobble board 3x30\" AP/ ML 3x30\" AP/ ML 3x30\" AP/ ML 2x30\" AP/ML         BAPS             HR with MB adduction iso    Red MB 2x10  Red MB 2x10         Ther Ex             Bicycle  8' " "L3 8' L3 8' L3 8\" L3 8' L4        Gastroc stretch with strap             Soleus stretch with strap             4 way ankle TB                                                                 Ther Activity             SLS with cone taps    Opp toe down 2x10  Opp toe down 2x10          Eccentric heel lowering on leg press  3x10 30# DL up/SL down to neutral  Standing over foam 2x10           Seated HR/TR 1x10 standing 2x10 with adduction MB squeeze            Step up 8\" 2x10  2x10 on BOSU with FT support  2x10 on BOSU with FT suppor 2x10 on BOSU with FT support 2x10 on BOSU with FT support        Mini squats with HR     5 reps of HR per 1 set of [id number]         Leg press     SL HR 30# 2x10         Toe walks/Heel walks    Length of bar 4x20 feet ea         Eccentric lowering     DL up and 75%WB lowering 2x10         Lateral walks with TB 4x20ft rtb    4x20 ft rtb          Gait Training                                       Modalities                                         1:1 with PT from 438-501pm                                   "

## 2025-01-13 ENCOUNTER — OFFICE VISIT (OUTPATIENT)
Dept: PHYSICAL THERAPY | Facility: CLINIC | Age: 45
End: 2025-01-13
Payer: COMMERCIAL

## 2025-01-13 DIAGNOSIS — M79.672 LEFT FOOT PAIN: Primary | ICD-10-CM

## 2025-01-13 DIAGNOSIS — Z98.890 STATUS POST FOOT SURGERY: ICD-10-CM

## 2025-01-13 DIAGNOSIS — R53.81 DEBILITY: ICD-10-CM

## 2025-01-13 PROCEDURE — 97530 THERAPEUTIC ACTIVITIES: CPT | Performed by: PHYSICAL THERAPIST

## 2025-01-13 PROCEDURE — 97140 MANUAL THERAPY 1/> REGIONS: CPT | Performed by: PHYSICAL THERAPIST

## 2025-01-13 PROCEDURE — 97110 THERAPEUTIC EXERCISES: CPT | Performed by: PHYSICAL THERAPIST

## 2025-01-13 NOTE — PROGRESS NOTES
"Daily Note     Today's date: 2025  Patient name: Sonja Parrish  : 1980  MRN: [id number]  Referring provider: Varinder Carbajal DPM  Dx:   Encounter Diagnosis     ICD-10-CM    1. Left foot pain  M79.672       2. Debility  R53.81       3. Status post foot surgery  Z98.890                      Subjective: Pt overall doing well. She notes having some soreness after her last session.       Objective: See treatment diary below      Assessment: Tolerated treatment well. Pt challenged with current plan. She demonstrates the ability to toe walk with modification. Initiated SL isometrics against wall for stabilization. Patient would benefit from continued PT. Recommend continued treatment secondary to weakness at involved foot/ankle.      Plan: Continue per plan of care. Pt will be re-evaluated next session. However, she continues to have functional weakness in left foot and ankle which is effecting her ability to walk and stand for prolonged periods.      Precautions: Anxiety, s/p foot  surgery on 24      Manuals        Left TC joint mobs JK JK JK JK JK JK       Left STJ  mobs JK JK JK JK JK JK       IASTM to medial ankle and tibia along PTT JK JK JK JK JK JK                    Neuro Re-Ed             SLS 10x10\" with FT support 10x10\" with FT support 10x10\" with FT support   10x10\" with FT support       SLS on foam    10x10\" with FT support 10x10\" with FT support 10x10\" with FT support  10x10\" with FT support        B wobble board (AP/PA)              Standing BAPS for ROM              Soleus heel raise             Toe yoga              Short foot             Toe abduction              Great toe flexion into TB             Wobble board 3x30\" AP/ ML 3x30\" AP/ ML 3x30\" AP/ ML 2x30\" AP/ML         BAPS             HR with MB adduction iso    Red MB 2x10  Red MB 2x10  Red MB 2x10        SL isometric against (dorsiflexion) wall (lean)      10x on ea       Ther Ex             Bicycle  " "8' L3 8' L3 8' L3 8\" L3 8' L4 8' L4       Gastroc stretch with strap             Soleus stretch with strap             4 way ankle TB                                                                 Ther Activity             SLS with cone taps    Opp toe down 2x10  Opp toe down 2x10          Eccentric heel lowering on leg press  3x10 30# DL up/SL down to neutral  Standing over foam 2x10           Seated HR/TR 1x10 standing 2x10 with adduction MB squeeze            Step up 8\" 2x10  2x10 on BOSU with FT support  2x10 on BOSU with FT suppor 2x10 on BOSU with FT support 2x10 on BOSU with FT support 2x10 on BOSU with FT support       Mini squats with HR     5 reps of HR per 1 set of [id number]  5 reps of HR per 1 set of [id number]        Leg press     SL HR 30# 2x10         Toe walks/Heel walks    Length of bar 4x20 feet ea  Length of bar 4x20 feet ea       Eccentric lowering     DL up and 75%WB lowering 2x10  DL up and 75%WB lowering 2x10       Lateral walks with TB 4x20ft rtb    4x20 ft rtb          Gait Training                                       Modalities                                         1:1 with PT from 415-5pm                                      "
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
## 2025-01-16 ENCOUNTER — APPOINTMENT (OUTPATIENT)
Dept: PHYSICAL THERAPY | Facility: CLINIC | Age: 45
End: 2025-01-16
Payer: COMMERCIAL

## 2025-01-21 ENCOUNTER — OFFICE VISIT (OUTPATIENT)
Dept: PHYSICAL THERAPY | Facility: CLINIC | Age: 45
End: 2025-01-21
Payer: COMMERCIAL

## 2025-01-21 DIAGNOSIS — Z98.890 STATUS POST FOOT SURGERY: ICD-10-CM

## 2025-01-21 DIAGNOSIS — R53.81 DEBILITY: ICD-10-CM

## 2025-01-21 DIAGNOSIS — M79.672 LEFT FOOT PAIN: Primary | ICD-10-CM

## 2025-01-21 PROCEDURE — 97110 THERAPEUTIC EXERCISES: CPT | Performed by: PHYSICAL THERAPIST

## 2025-01-21 PROCEDURE — 97530 THERAPEUTIC ACTIVITIES: CPT | Performed by: PHYSICAL THERAPIST

## 2025-01-21 PROCEDURE — 97140 MANUAL THERAPY 1/> REGIONS: CPT | Performed by: PHYSICAL THERAPIST

## 2025-01-21 NOTE — PROGRESS NOTES
[id number]    Today's date: 2025  Patient name: Sonja Parrish  : 1980  MRN: [id number]  Referring provider: Varinder Carbajal DPM  Dx:   Encounter Diagnosis     ICD-10-CM    1. Left foot pain  M79.672       2. Debility  R53.81       3. Status post foot surgery  Z98.890                          Assessment  Impairments: abnormal gait, abnormal or restricted ROM, abnormal movement, activity intolerance, impaired physical strength, lacks appropriate home exercise program and pain with function  Functional limitations: ambulation, stair negotiation, squatting, transfers, weight bearing ADLs.    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 Carbajal on 24 (Galloner procedure).  The patient has continued to benefit from therapy noting improved ankle strength, decreased pain, and increased overall endurance when performing weight-bearing activities like walking and stair navigation. The patient still struggles with ambulating over one mile without onset of pain and lacks the ankle strength to perform a heel raises needed for adequate push-off during gait. Sonja would benefit from skilled physical therapy at this time to improve function, reduce pain, increase ROM, increase strength, and return to premorbid function.     Understanding of Dx/Px/POC: good     Prognosis: good    Goals  Short Term Goals: to be achieved by 4 weeks  1) Patient to be independent with basic HEP.-MET  2) Decrease pain to 3/10 at its worst.-Partially met   3) Increase left ankle ROM by 5-10 degrees -Partially met  4) Increase LE strength by 1/2 MMT grade in all deficient planes.-met    Long Term Goals: to be achieved by discharge  1) FOTO equal to or greater than expected.-Partially met  2) Ambulation to improve to maximal level of function-Partially met  3) Stair negotiation will improve to reciprocal.-met  4) Pt to participate in age appropriate activities without  pain-Partially met    Plan  Patient would benefit from: skilled PT  Planned modality interventions: cryotherapy and TENS    Planned therapy interventions: ADL training, manual therapy, motor coordination training, neuromuscular re-education, therapeutic activities, therapeutic exercise, gait training and functional ROM exercises    Frequency: 2x per week for 4-6 weeks.  Treatment plan discussed with: patient        Subjective Evaluation    History of Present Illness  Date of surgery: 9/13/2024  Mechanism of injury: surgery  Mechanism of injury: History of Current Injury: Pt is 6.5 weeks s/p excision of accessory navicular bone of left foot. Pt is no longer in splint/brace and currently wearing HOKAs and orthotics (for 2 weeks now). Pt previously had bunionectomy in both feet (~17 to 20 years ago). Pt feels she is progressing. She continues to have discomfort at medial foot/ankle. She reports worsening swelling when on her feet all day. She did just return from vacation (football game at [facility]) and was on a plane.  She knows her walking gait needs improvement as she feels restricted at involved foot/ankle.  Pain location/Descriptors: Dull pain at medial aspect of ankle and around the ankle of left foot.   Aggravating factors: Weight bearing activities, prolonged standing, walking.   Easing factors: Advil  24 HR pattern: Worse with activity.   Imaging: XR:  IMPRESSION:  Redemonstrated postop changes of left bunionectomy with metallic screw fixation across a healed/healing first metatarsal neck osteotomy and metallic wire fixation along the lateral margin of the left great toe proximal phalanx.    Special Questions: Numbness present over distal tibia of left foot which is improving.   Patient goals:  [employer] course on May 31st. Would like to return to walk and exercise.   Hobbies/Interest: Try to regularly exercise but hasn't since the injury   Occupation:       Patient  Goals  Patient goals for therapy: decreased pain, decreased edema, improved balance, increased motion, increased strength and independence with ADLs/IADLs    Pain  Current pain ratin  At worst pain rating: 3    Treatments  No previous or current treatments        Objective     Observations   Left Ankle/Foot   Positive for edema, effusion and incision.     Additional Observation Details  Incision intact     Active Range of Motion   Left Ankle/Foot   Dorsiflexion (ke): 9 degrees   Plantar flexion: 68 degrees   Inversion: 30 degrees   Eversion: 15 degrees     Right Ankle/Foot   Dorsiflexion (ke): 10 degrees   Plantar flexion: 70 degrees   Inversion: 22 degrees   Eversion: 5 degrees     Passive Range of Motion   Left Hip   Normal passive range of motion    Right Hip   Normal passive range of motion    Strength/Myotome Testing     Left Hip   Planes of Motion   Flexion: 5  Extension: 5  Abduction: 5    Right Hip   Planes of Motion   Flexion: 5  Extension: 5  Abduction: 5    Left Knee   Flexion: 5  Extension: 5    Right Knee   Flexion: 5  Extension: 5    Left Ankle/Foot   Dorsiflexion: 5  Plantar flexion: 4+ (performed NWB)  Inversion: 4+  Eversion: 5    Right Ankle/Foot   Normal strength    Tests     Additional Tests Details  Moderate restriction in hamstrings and gastroc/soleus complex on L  TC joint mobility deficit on left       Swelling   Left Ankle/Foot   Metatarsal heads: 21.5 cm  Figure 8: 49 cm  Malleoli: 23 cm    Ambulation     Observational Gait   Gait: antalgic   Decreased walking speed and stride length.     Additional Observational Gait Details  Shod gait:  (Hokas with orthotics)    decreased terminal stance and push off GT on the left        Precautions: Anxiety, s/p foot  surgery on 24        Manuals       Left TC joint mobs JK ANA M VIRAMONTES NB      Left STJ  mobs ANA M VIRAMONTES NB      IASTM to medial ankle and tibia along PTT JK ANA M VIRAMONTES               "      Neuro Re-Ed             SLS 10x10\" with FT support 10x10\" with FT support 10x10\" with FT support   10x10\" with FT support 10x10\" with FT support      SLS on foam    10x10\" with FT support 10x10\" with FT support 10x10\" with FT support  10x10\" with FT support  10x10\" with FT support      B wobble board (AP/PA)              Standing BAPS for ROM              Soleus heel raise             Toe yoga              Short foot             Toe abduction              Great toe flexion into TB             Wobble board 3x30\" AP/ ML 3x30\" AP/ ML 3x30\" AP/ ML 2x30\" AP/ML         BAPS             HR with MB adduction iso    Red MB 2x10  Red MB 2x10  Red MB 2x10  Red MB 2x10      SL isometric against (dorsiflexion) wall (lean)      10x on ea 10x on ea      Ther Ex             Bicycle  8' L3 8' L3 8' L3 8\" L3 8' L4 8' L4 8' L4      Gastroc stretch with strap             Soleus stretch with strap             4 way ankle TB                                                                 Ther Activity             SLS with cone taps    Opp toe down 2x10  Opp toe down 2x10          Eccentric heel lowering on leg press  3x10 30# DL up/SL down to neutral  Standing over foam 2x10           Seated HR/TR 1x10 standing 2x10 with adduction MB squeeze            Step up 8\" 2x10  2x10 on BOSU with FT support  2x10 on BOSU with FT suppor 2x10 on BOSU with FT support 2x10 on BOSU with FT support 2x10 on BOSU with FT support 2x10 on BOSU with FT support      Mini squats with HR     5 reps of HR per 1 set of [id number]  5 reps of HR per 1 set of [id number]  5 reps of HR per 1 set of squat 2x      Leg press     SL HR 30# 2x10         Toe walks/Heel walks    Length of bar 4x20 feet ea  Length of bar 4x20 feet ea Length of bar 4x20 feet ea      Eccentric lowering     DL up and 75%WB lowering 2x10  DL up and 75%WB lowering 2x10 DL up and 75% WB lowering 2x10      Lateral walks with TB 4x20ft rtb    4x20 ft rtb          Gait Training                        "                Modalities                                         1:1 with PT from [phone]

## 2025-01-24 ENCOUNTER — TELEPHONE (OUTPATIENT)
Dept: FAMILY MEDICINE CLINIC | Facility: CLINIC | Age: 45
End: 2025-01-24

## 2025-01-24 ENCOUNTER — OFFICE VISIT (OUTPATIENT)
Dept: PHYSICAL THERAPY | Facility: CLINIC | Age: 45
End: 2025-01-24
Payer: COMMERCIAL

## 2025-01-24 DIAGNOSIS — E66.09 CLASS 2 OBESITY DUE TO EXCESS CALORIES WITHOUT SERIOUS COMORBIDITY WITH BODY MASS INDEX (BMI) OF 37.0 TO 37.9 IN ADULT: Primary | ICD-10-CM

## 2025-01-24 DIAGNOSIS — Z98.890 STATUS POST FOOT SURGERY: ICD-10-CM

## 2025-01-24 DIAGNOSIS — E66.812 CLASS 2 OBESITY DUE TO EXCESS CALORIES WITHOUT SERIOUS COMORBIDITY WITH BODY MASS INDEX (BMI) OF 37.0 TO 37.9 IN ADULT: Primary | ICD-10-CM

## 2025-01-24 DIAGNOSIS — M79.672 LEFT FOOT PAIN: Primary | ICD-10-CM

## 2025-01-24 DIAGNOSIS — R53.81 DEBILITY: ICD-10-CM

## 2025-01-24 PROCEDURE — 97140 MANUAL THERAPY 1/> REGIONS: CPT | Performed by: PHYSICAL THERAPIST

## 2025-01-24 PROCEDURE — 97110 THERAPEUTIC EXERCISES: CPT | Performed by: PHYSICAL THERAPIST

## 2025-01-24 PROCEDURE — 97530 THERAPEUTIC ACTIVITIES: CPT | Performed by: PHYSICAL THERAPIST

## 2025-01-24 NOTE — TELEPHONE ENCOUNTER
Per Dr. Astorga, patient was advised . Patient stated that she would like to follow up with weight management. Please notify the patient when referral is placed.

## 2025-01-24 NOTE — PROGRESS NOTES
"Daily Note     Today's date: 2025  Patient name: Sonja Parrish  : 1980  MRN: [id number]  Referring provider: Varinder Carbajal DPM  Dx:   Encounter Diagnosis     ICD-10-CM    1. Left foot pain  M79.672       2. Debility  R53.81       3. Status post foot surgery  Z98.890                      Subjective: Pt overall doing well. She denies much pain after last session and re-evaluation. She still feels weakness at LLE, especially during SL activities and walking.       Objective: See treatment diary below      Assessment: Pt still lacking muscle performance of left gastroc/soleus complex when it comes to SL HR and push off. Tolerated treatment well.  Instructed SL isometric hold on the left. Patient demonstrated fatigue post treatment and would benefit from continued PT.       Plan: Continue per plan of care. Changing POC to 1 x per week to decrease utilization and reserve PT sessions for when progression is possible. Pt in agreement.      Precautions: Anxiety, s/p foot  surgery on 24        Manuals      Left TC joint mobs JK JK JK JK JK JK NB JK     Left STJ  mobs JK JK JK JK JK JK NB JK     IASTM to medial ankle and tibia along PTT JK JK JK JK JK JK  JK                  Neuro Re-Ed             SLS 10x10\" with FT support 10x10\" with FT support 10x10\" with FT support   10x10\" with FT support 10x10\" with FT support      SLS on foam    10x10\" with FT support 10x10\" with FT support 10x10\" with FT support  10x10\" with FT support  10x10\" with FT support 10x10\" with FT support     B wobble board (AP/PA)              Standing BAPS for ROM              Soleus heel raise             Toe yoga              Short foot             Toe abduction              Great toe flexion into TB             Wobble board 3x30\" AP/ ML 3x30\" AP/ ML 3x30\" AP/ ML 2x30\" AP/ML         BAPS             HR with MB adduction iso    Red MB 2x10  Red MB 2x10  Red MB 2x10  Red MB 2x10 Red MB 2x10   " "  SL isometric against (dorsiflexion) wall (lean)      10x on ea 10x on ea DL up , SL hold x10 2 sets      Ther Ex             Bicycle  8' L3 8' L3 8' L3 8\" L3 8' L4 8' L4 8' L4 8' L4     Gastroc stretch with strap             Soleus stretch with strap             4 way ankle TB                                                                 Ther Activity             SLS with cone taps    Opp toe down 2x10  Opp toe down 2x10          Eccentric heel lowering on leg press  3x10 30# DL up/SL down to neutral  Standing over foam 2x10           Seated HR/TR 1x10 standing 2x10 with adduction MB squeeze            Step up 8\" 2x10  2x10 on BOSU with FT support  2x10 on BOSU with FT suppor 2x10 on BOSU with FT support 2x10 on BOSU with FT support 2x10 on BOSU with FT support 2x10 on BOSU with FT support 2x10 on BOSU with FT support     Mini squats with HR     5 reps of HR per 1 set of [id number]  5 reps of HR per 1 set of [id number]  5 reps of HR per 1 set of squat 2x      Leg press     SL HR 30# 2x10         Toe walks/Heel walks    Length of bar 4x20 feet ea  Length of bar 4x20 feet ea Length of bar 4x20 feet ea      Eccentric lowering     DL up and 75%WB lowering 2x10  DL up and 75%WB lowering 2x10 DL up and 75% WB lowering 2x10 DL up and 75% WB lowering 2x10     Lateral walks with TB 4x20ft rtb    4x20 ft rtb          Gait Training                                       Modalities                                         1:1 with PT from [phone]                                         "

## 2025-01-24 NOTE — TELEPHONE ENCOUNTER
Pt states that her insurance company will not approve Ozempic and is asking if PCP can recommend any of the other generic weight loss medications     Please advise

## 2025-01-28 ENCOUNTER — APPOINTMENT (OUTPATIENT)
Dept: PHYSICAL THERAPY | Facility: CLINIC | Age: 45
End: 2025-01-28
Payer: COMMERCIAL

## 2025-01-31 ENCOUNTER — OFFICE VISIT (OUTPATIENT)
Dept: PHYSICAL THERAPY | Facility: CLINIC | Age: 45
End: 2025-01-31
Payer: COMMERCIAL

## 2025-01-31 DIAGNOSIS — M79.672 LEFT FOOT PAIN: Primary | ICD-10-CM

## 2025-01-31 DIAGNOSIS — R53.81 DEBILITY: ICD-10-CM

## 2025-01-31 DIAGNOSIS — Z98.890 STATUS POST FOOT SURGERY: ICD-10-CM

## 2025-01-31 PROCEDURE — 97110 THERAPEUTIC EXERCISES: CPT | Performed by: PHYSICAL THERAPIST

## 2025-01-31 PROCEDURE — 97530 THERAPEUTIC ACTIVITIES: CPT | Performed by: PHYSICAL THERAPIST

## 2025-01-31 PROCEDURE — 97140 MANUAL THERAPY 1/> REGIONS: CPT | Performed by: PHYSICAL THERAPIST

## 2025-01-31 NOTE — PROGRESS NOTES
"Daily Note     Today's date: 2025  Patient name: Sonja Parrish  : 1980  MRN: [id number]  Referring provider: Varinder Carbajal DPM  Dx:   Encounter Diagnosis     ICD-10-CM    1. Left foot pain  M79.672       2. Debility  R53.81       3. Status post foot surgery  Z98.890                      Subjective: Pt was able to walk 2.5 miles the other day with a friend. Notes mild discomfort but still able to complete.       Objective: See treatment diary below      Assessment: Tolerated treatment well. Pt able to gradually improve the amount of stress going through left foot/ankle without at much pain. She still has weakness compared to the right; however, motor control and strength are improving. We performed SL eccentric heel lowering on biodex (weight bearing percentage) to objectively identify the % WB over left LE. Patient exhibited good technique with therapeutic exercises and would benefit from continued PT.       Plan: Continue per plan of care.      Precautions: Anxiety, s/p foot  surgery on 24        Manuals     Left TC joint mobs JK JK JK JK JK JK NB JK JK    Left STJ  mobs JK JK JK JK JK JK NB JK JK    IASTM to medial ankle and tibia along PTT JK JK JK JK JK JK  JK JK                 Neuro Re-Ed             SLS 10x10\" with FT support 10x10\" with FT support 10x10\" with FT support   10x10\" with FT support 10x10\" with FT support      SLS on foam    10x10\" with FT support 10x10\" with FT support 10x10\" with FT support  10x10\" with FT support  10x10\" with FT support 10x10\" with FT support 10x10\" with FT support    B wobble board (AP/PA)              Standing BAPS for ROM              Soleus heel raise             Toe yoga              Short foot             Toe abduction              Great toe flexion into TB             Wobble board 3x30\" AP/ ML 3x30\" AP/ ML 3x30\" AP/ ML 2x30\" AP/ML         BAPS             HR with MB adduction iso    Red MB 2x10  Red MB " "2x10  Red MB 2x10  Red MB 2x10 Red MB 2x10 Red MB 2x10    SL isometric against (dorsiflexion) wall (lean)      10x on ea 10x on ea DL up , SL hold x10 2 sets  DL up , SL hold x10 2 sets     Ther Ex             Bicycle  8' L3 8' L3 8' L3 8\" L3 8' L4 8' L4 8' L4 8' L4 8' L4    Gastroc stretch with strap             Soleus stretch with strap             4 way ankle TB                                                                 Ther Activity             SLS with cone taps    Opp toe down 2x10  Opp toe down 2x10          Eccentric heel lowering on leg press  3x10 30# DL up/SL down to neutral  Standing over foam 2x10           Seated HR/TR 1x10 standing 2x10 with adduction MB squeeze            Step up 8\" 2x10  2x10 on BOSU with FT support  2x10 on BOSU with FT suppor 2x10 on BOSU with FT support 2x10 on BOSU with FT support 2x10 on BOSU with FT support 2x10 on BOSU with FT support 2x10 on BOSU with FT support 2x10 on BOSU with FT support    Mini squats with HR     5 reps of HR per 1 set of [id number]  5 reps of HR per 1 set of [id number]  5 reps of HR per 1 set of squat 2x  5 reps of HR per 1 set of squat 5x    Leg press     SL HR 30# 2x10         Toe walks/Heel walks    Length of bar 4x20 feet ea  Length of bar 4x20 feet ea Length of bar 4x20 feet ea      Eccentric lowering     DL up and 75%WB lowering 2x10  DL up and 75%WB lowering 2x10 DL up and 75% WB lowering 2x10 DL up and 75% WB lowering 2x10 DL up and 90% WB lowering 2x10 Biodex to monitoring     Lateral walks with TB 4x20ft rtb    4x20 ft rtb          Gait Training                                       Modalities                                         1:1 with PT from [phone]                                          "

## 2025-02-04 ENCOUNTER — OFFICE VISIT (OUTPATIENT)
Dept: PHYSICAL THERAPY | Facility: CLINIC | Age: 45
End: 2025-02-04
Payer: COMMERCIAL

## 2025-02-04 DIAGNOSIS — M79.672 LEFT FOOT PAIN: Primary | ICD-10-CM

## 2025-02-04 DIAGNOSIS — Z98.890 STATUS POST FOOT SURGERY: ICD-10-CM

## 2025-02-04 DIAGNOSIS — R53.81 DEBILITY: ICD-10-CM

## 2025-02-04 PROCEDURE — 97140 MANUAL THERAPY 1/> REGIONS: CPT | Performed by: PHYSICAL THERAPIST

## 2025-02-04 PROCEDURE — 97110 THERAPEUTIC EXERCISES: CPT | Performed by: PHYSICAL THERAPIST

## 2025-02-04 PROCEDURE — 97530 THERAPEUTIC ACTIVITIES: CPT | Performed by: PHYSICAL THERAPIST

## 2025-02-04 NOTE — PROGRESS NOTES
"Daily Note     Today's date: 2025  Patient name: Sonja Parrish  : 1980  MRN: [id number]  Referring provider: Varinder Carbajal DPM  Dx:   Encounter Diagnosis     ICD-10-CM    1. Left foot pain  M79.672       2. Debility  R53.81       3. Status post foot surgery  Z98.890                      Subjective: Pt overall doing well.       Objective: See treatment diary below      Assessment: Progressed program with appropriate fatigue. Discussed and recommend custom orthotics secondary to activity level and active lifestyle. Implemented blazepods for SLB. Balance and proprioception challenged with program. Weakness continues at involved ankle and foot, although improving. Tolerated treatment well. Patient would benefit from continued PT.       Plan: Continue per plan of care.      Precautions: Anxiety, s/p foot  surgery on 24        Manuals /   Left TC joint mobs JK JK JK JK JK JK NB JK JK JK   Left STJ  mobs JK JK JK JK JK JK NB JK JK JK   IASTM to medial ankle and tibia along PTT JK JK JK JK JK JK  JK JK JK                Neuro Re-Ed             SLS 10x10\" with FT support 10x10\" with FT support 10x10\" with FT support   10x10\" with FT support 10x10\" with FT support      SLS on foam    10x10\" with FT support 10x10\" with FT support 10x10\" with FT support  10x10\" with FT support  10x10\" with FT support 10x10\" with FT support 10x10\" with FT support    B wobble board (AP/PA)              Standing BAPS for ROM              Soleus heel raise             Toe yoga              Short foot             Toe abduction              Great toe flexion into TB             Wobble board 3x30\" AP/ ML 3x30\" AP/ ML 3x30\" AP/ ML 2x30\" AP/ML         BAPS             HR with MB adduction iso    Red MB 2x10  Red MB 2x10  Red MB 2x10  Red MB 2x10 Red MB 2x10 Red MB 2x10 Red MB 2x10   SL isometric against (dorsiflexion) wall (lean)      10x on ea 10x on ea DL up , SL hold x10 2 sets  DL " "up , SL hold x10 2 sets  DL up , SL hold x10 5 seconds   Blaze pods SLS clap          4x30\" SLS   Ther Ex             Bicycle  8' L3 8' L3 8' L3 8\" L3 8' L4 8' L4 8' L4 8' L4 8' L4 8' L4   Gastroc stretch with strap             Soleus stretch with strap             4 way ankle TB                                                                 Ther Activity             SLS with cone taps    Opp toe down 2x10  Opp toe down 2x10          Eccentric heel lowering on leg press  3x10 30# DL up/SL down to neutral  Standing over foam 2x10           Seated HR/TR 1x10 standing 2x10 with adduction MB squeeze            Step up 8\" 2x10  2x10 on BOSU with FT support  2x10 on BOSU with FT suppor 2x10 on BOSU with FT support 2x10 on BOSU with FT support 2x10 on BOSU with FT support 2x10 on BOSU with FT support 2x10 on BOSU with FT support 2x10 on BOSU with FT support 2x10 on BOSU with FT support   Mini squats with HR     5 reps of HR per 1 set of [id number]  5 reps of HR per 1 set of [id number]  5 reps of HR per 1 set of squat 2x  5 reps of HR per 1 set of squat 5x 5 reps of HR per 1 set of squat 5x   Leg press     SL HR 30# 2x10         Toe walks/Heel walks    Length of bar 4x20 feet ea  Length of bar 4x20 feet ea Length of bar 4x20 feet ea      Eccentric lowering     DL up and 75%WB lowering 2x10  DL up and 75%WB lowering 2x10 DL up and 75% WB lowering 2x10 DL up and 75% WB lowering 2x10 DL up and 90% WB lowering 2x10 Biodex to monitoring  DL up and 95% WB lowering 3x10 Biodex to monitoring    Lateral walks with TB 4x20ft rtb    4x20 ft rtb          Gait Training                                       Modalities                                         1:1 with PT from [phone]                                            "

## 2025-02-13 ENCOUNTER — OFFICE VISIT (OUTPATIENT)
Dept: PHYSICAL THERAPY | Facility: CLINIC | Age: 45
End: 2025-02-13
Payer: COMMERCIAL

## 2025-02-13 DIAGNOSIS — Z98.890 STATUS POST FOOT SURGERY: ICD-10-CM

## 2025-02-13 DIAGNOSIS — R53.81 DEBILITY: ICD-10-CM

## 2025-02-13 DIAGNOSIS — M79.672 LEFT FOOT PAIN: Primary | ICD-10-CM

## 2025-02-13 PROCEDURE — 97110 THERAPEUTIC EXERCISES: CPT | Performed by: PHYSICAL THERAPIST

## 2025-02-13 PROCEDURE — 97140 MANUAL THERAPY 1/> REGIONS: CPT | Performed by: PHYSICAL THERAPIST

## 2025-02-13 NOTE — PROGRESS NOTES
"Daily Note     Today's date: 2025  Patient name: Sonja Parrish  : 1980  MRN: [id number]  Referring provider: Varinder Carbajal DPM  Dx:   Encounter Diagnosis     ICD-10-CM    1. Left foot pain  M79.672       2. Debility  R53.81       3. Status post foot surgery  Z98.890                      Subjective: Pt reports walking for 1.5 miles every other day. Has noted more soreness over the last week in foot and insertion point. She did get fitted for custom orthotics yet.       Objective: See treatment diary below  Pt able to do several SL HR on the left with appropriate foot and heel clearance.     Assessment: Pt is doing well overall. Still having difficulty with prolonged walking. She is not walking on consecutive days, as instructed. Tolerated treatment well. Pt was appropriately fatigued with prescribed exercises. She does now have the capability to perform a SL heel raise, however, not comparatively to the right side in terms of force production and control. Patient would benefit from continued PT.       Plan: Continue per plan of care. Increase SL Heel raise strength and progress to more load as appropriate.     Precautions: Anxiety, s/p foot  surgery on 24        Manuals  2/   Left TC joint mobs JK    JK JK NB JK JK JK   Left STJ  mobs JK    JK JK NB JK JK JK   IASTM to medial ankle and tibia along PTT JK    JK JK  JK JK JK                Neuro Re-Ed             SLS      10x10\" with FT support 10x10\" with FT support      SLS on foam      10x10\" with FT support  10x10\" with FT support  10x10\" with FT support 10x10\" with FT support 10x10\" with FT support    B wobble board (AP/PA)              Standing BAPS for ROM              Soleus heel raise             Toe yoga              Short foot             Toe abduction              Great toe flexion into TB             Wobble board             BAPS             HR with MB adduction iso Red MB 2x10    Red MB 2x10  Red MB 2x10 " " Red MB 2x10 Red MB 2x10 Red MB 2x10 Red MB 2x10   SL isometric against (dorsiflexion) wall (lean) DL up , SL hold x10 5 seconds     10x on ea 10x on ea DL up , SL hold x10 2 sets  DL up , SL hold x10 2 sets  DL up , SL hold x10 5 seconds   Blaze pods SLS clap 4x30\" SLS         4x30\" SLS   Ther Ex             Bicycle  8' L4    8' L4 8' L4 8' L4 8' L4 8' L4 8' L4   Gastroc stretch with strap             Soleus stretch with strap             4 way ankle TB                                                                 Ther Activity             SLS with cone taps              POGO hops  DL NV           Eccentric heel lowering on leg press             SL HR 2x on L with UE support             Seated HR/TR             Step up 2x10 on BOSU with FT support    2x10 on BOSU with FT support 2x10 on BOSU with FT support 2x10 on BOSU with FT support 2x10 on BOSU with FT support 2x10 on BOSU with FT support 2x10 on BOSU with FT support   Mini squats with HR 5 reps of HR per 1 set of squat 5x    5 reps of HR per 1 set of [id number]  5 reps of HR per 1 set of [id number]  5 reps of HR per 1 set of squat 2x  5 reps of HR per 1 set of squat 5x 5 reps of HR per 1 set of squat 5x   Leg press              Toe walks/Heel walks      Length of bar 4x20 feet ea Length of bar 4x20 feet ea      Eccentric lowering DL up and 95% WB lowering 3x10 Biodex to monitoring     DL up and 75%WB lowering 2x10  DL up and 75%WB lowering 2x10 DL up and 75% WB lowering 2x10 DL up and 75% WB lowering 2x10 DL up and 90% WB lowering 2x10 Biodex to monitoring  DL up and 95% WB lowering 3x10 Biodex to monitoring    Lateral walks with TB             Gait Training                                       Modalities                                         1:1 with PT from [phone]                                              "

## 2025-02-21 ENCOUNTER — OFFICE VISIT (OUTPATIENT)
Dept: PHYSICAL THERAPY | Facility: CLINIC | Age: 45
End: 2025-02-21
Payer: COMMERCIAL

## 2025-02-21 DIAGNOSIS — R53.81 DEBILITY: ICD-10-CM

## 2025-02-21 DIAGNOSIS — Z98.890 STATUS POST FOOT SURGERY: ICD-10-CM

## 2025-02-21 DIAGNOSIS — M79.672 LEFT FOOT PAIN: Primary | ICD-10-CM

## 2025-02-21 PROCEDURE — 97110 THERAPEUTIC EXERCISES: CPT | Performed by: PHYSICAL THERAPIST

## 2025-02-21 PROCEDURE — 97140 MANUAL THERAPY 1/> REGIONS: CPT | Performed by: PHYSICAL THERAPIST

## 2025-02-21 PROCEDURE — 97530 THERAPEUTIC ACTIVITIES: CPT | Performed by: PHYSICAL THERAPIST

## 2025-02-21 NOTE — PROGRESS NOTES
"Daily Note     Today's date: 2025  Patient name: Sonja Parrish  : 1980  MRN: [id number]  Referring provider: Varinder Carbajal DPM  Dx:   Encounter Diagnosis     ICD-10-CM    1. Left foot pain  M79.672       2. Debility  R53.81       3. Status post foot surgery  Z98.890                      Subjective: Pt offers no new complaints this morning. She continues to exercise and walk without having increased irritability or discomfort in her foot.        Objective: See treatment diary below      Assessment: Pt is 5.5 months post op. Tolerated treatment well. Progressed program with good form and stability. Introduced plyometrics to POC. Pt able to perform pogo hops without adverse effects. Patient demonstrated fatigue post treatment, exhibited good technique with therapeutic exercises, and would benefit from continued PT.       Plan: Continue per plan of care. Re-evaluate next session.     Precautions: Anxiety, s/p foot  surgery on 24        Manuals  2/   Left TC joint mobs JK JK   JK JK NB JK JK JK   Left STJ  mobs JK JK   JK JK NB JK JK JK   IASTM to medial ankle and tibia along PTT JK JK   JK JK  JK JK JK                Neuro Re-Ed             SLS      10x10\" with FT support 10x10\" with FT support      SLS on foam      10x10\" with FT support  10x10\" with FT support  10x10\" with FT support 10x10\" with FT support 10x10\" with FT support    B wobble board (AP/PA)              Standing BAPS for ROM              Soleus heel raise             Toe yoga              Short foot             Toe abduction              Great toe flexion into TB             Wobble board             BAPS             HR with MB adduction iso Red MB 2x10 Red MB 3x10   Red MB 2x10  Red MB 2x10  Red MB 2x10 Red MB 2x10 Red MB 2x10 Red MB 2x10   SL isometric against (dorsiflexion) wall (lean) DL up , SL hold x10 5 seconds DL up , SL hold x10 5 seconds    10x on ea 10x on ea DL up , SL hold x10 2 sets  DL " "up , SL hold x10 2 sets  DL up , SL hold x10 5 seconds   Blaze pods SLS clap 4x30\" SLS         4x30\" SLS   Ther Ex             Bicycle  8' L4 8' L4   8' L4 8' L4 8' L4 8' L4 8' L4 8' L4   Gastroc stretch with strap             Soleus stretch with strap             4 way ankle TB                                                                 Ther Activity             SLS with cone taps              POGO hops  30\" with UE support            Alternating POGO  30\" with UE support           Eccentric heel lowering on leg press             SL HR 2x on L with UE support             Seated HR/TR             Step up 2x10 on BOSU with FT support 2x10 on step with george disc    2x10 on BOSU with FT support 2x10 on BOSU with FT support 2x10 on BOSU with FT support 2x10 on BOSU with FT support 2x10 on BOSU with FT support 2x10 on BOSU with FT support   Mini squats with HR 5 reps of HR per 1 set of squat 5x    5 reps of HR per 1 set of [id number]  5 reps of HR per 1 set of [id number]  5 reps of HR per 1 set of squat 2x  5 reps of HR per 1 set of squat 5x 5 reps of HR per 1 set of squat 5x   Leg press              Toe walks/Heel walks      Length of bar 4x20 feet ea Length of bar 4x20 feet ea      Eccentric lowering DL up and 95% WB lowering 3x10 Biodex to monitoring  DL up and 95% WB lowering 3x10 Biodex to monitoring    DL up and 75%WB lowering 2x10  DL up and 75%WB lowering 2x10 DL up and 75% WB lowering 2x10 DL up and 75% WB lowering 2x10 DL up and 90% WB lowering 2x10 Biodex to monitoring  DL up and 95% WB lowering 3x10 Biodex to monitoring    SL HR while walking- hand rail  6x10 feet           Lateral walks with TB             Gait Training                                       Modalities                                         1:1 with PT from 910-955am    HEP:  HEP:  1)Pogo hops: 30 seconds with counter support. 2 sets  2)Alternating Pogo hops: 30 seconds with counter support. 2 sets  3)Single leg isometric hold: Stay up on " toes, shift weight on left and hold for 5 seconds. Repeat 10x  4)Walking single leg Heel raise. 20x on each (May need arm support)   5)Heel raise with ball squeeze. Place in between heels. Raise heels. 3 x10  6) Single leg balance: Balance on left  leg. 10 x10”   7)Heel raise with lowering on left leg: Go up with both, shift 100% of weight to left, lower slowly. 3x10   8)Toe walks. Walk on your tip toes for 20 feet. Repeat 4xs

## 2025-02-24 ENCOUNTER — OFFICE VISIT (OUTPATIENT)
Dept: FAMILY MEDICINE CLINIC | Facility: CLINIC | Age: 45
End: 2025-02-24
Payer: COMMERCIAL

## 2025-02-24 VITALS
SYSTOLIC BLOOD PRESSURE: 122 MMHG | OXYGEN SATURATION: 98 % | HEIGHT: 62 IN | DIASTOLIC BLOOD PRESSURE: 78 MMHG | BODY MASS INDEX: 37.58 KG/M2 | HEART RATE: 92 BPM | WEIGHT: 204.2 LBS | TEMPERATURE: 97.8 F

## 2025-02-24 DIAGNOSIS — Z00.00 ANNUAL PHYSICAL EXAM: Primary | ICD-10-CM

## 2025-02-24 DIAGNOSIS — Z13.83 SCREENING FOR CARDIOVASCULAR, RESPIRATORY, AND GENITOURINARY DISEASES: ICD-10-CM

## 2025-02-24 DIAGNOSIS — R09.81 CHRONIC NASAL CONGESTION: ICD-10-CM

## 2025-02-24 DIAGNOSIS — F41.9 ANXIETY: ICD-10-CM

## 2025-02-24 DIAGNOSIS — H69.93 ETD (EUSTACHIAN TUBE DYSFUNCTION), BILATERAL: ICD-10-CM

## 2025-02-24 DIAGNOSIS — Z13.6 SCREENING FOR CARDIOVASCULAR, RESPIRATORY, AND GENITOURINARY DISEASES: ICD-10-CM

## 2025-02-24 DIAGNOSIS — Z13.89 SCREENING FOR CARDIOVASCULAR, RESPIRATORY, AND GENITOURINARY DISEASES: ICD-10-CM

## 2025-02-24 DIAGNOSIS — R63.5 WEIGHT GAIN: ICD-10-CM

## 2025-02-24 PROCEDURE — 99214 OFFICE O/P EST MOD 30 MIN: CPT | Performed by: FAMILY MEDICINE

## 2025-02-24 PROCEDURE — 99396 PREV VISIT EST AGE 40-64: CPT | Performed by: FAMILY MEDICINE

## 2025-02-24 RX ORDER — ESCITALOPRAM OXALATE 10 MG/1
5 TABLET ORAL DAILY
Qty: 15 TABLET | Refills: 5 | Status: SHIPPED | OUTPATIENT
Start: 2025-02-24
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
## 2025-02-25 NOTE — ASSESSMENT & PLAN NOTE
Secondary to chronic nasal congestion. Continue present care. Check allergy panel. Start Flonase puff bilat qd. Recheck 2w if not improving,.

## 2025-02-28 ENCOUNTER — OFFICE VISIT (OUTPATIENT)
Dept: PHYSICAL THERAPY | Facility: CLINIC | Age: 45
End: 2025-02-28
Payer: COMMERCIAL

## 2025-02-28 ENCOUNTER — APPOINTMENT (OUTPATIENT)
Dept: LAB | Facility: CLINIC | Age: 45
End: 2025-02-28
Payer: COMMERCIAL

## 2025-02-28 DIAGNOSIS — Z98.890 STATUS POST FOOT SURGERY: ICD-10-CM

## 2025-02-28 DIAGNOSIS — R09.81 CHRONIC NASAL CONGESTION: ICD-10-CM

## 2025-02-28 DIAGNOSIS — R63.5 WEIGHT GAIN: ICD-10-CM

## 2025-02-28 DIAGNOSIS — M79.672 LEFT FOOT PAIN: Primary | ICD-10-CM

## 2025-02-28 DIAGNOSIS — R53.81 DEBILITY: ICD-10-CM

## 2025-02-28 DIAGNOSIS — Z13.83 SCREENING FOR CARDIOVASCULAR, RESPIRATORY, AND GENITOURINARY DISEASES: ICD-10-CM

## 2025-02-28 DIAGNOSIS — F41.9 ANXIETY: ICD-10-CM

## 2025-02-28 DIAGNOSIS — Z13.6 SCREENING FOR CARDIOVASCULAR, RESPIRATORY, AND GENITOURINARY DISEASES: ICD-10-CM

## 2025-02-28 DIAGNOSIS — Z13.89 SCREENING FOR CARDIOVASCULAR, RESPIRATORY, AND GENITOURINARY DISEASES: ICD-10-CM

## 2025-02-28 LAB
ALBUMIN SERPL BCG-MCNC: 4.1 G/DL (ref 3.5–5)
ALP SERPL-CCNC: 57 U/L (ref 34–104)
ALT SERPL W P-5'-P-CCNC: 16 U/L (ref 7–52)
ANION GAP SERPL CALCULATED.3IONS-SCNC: 8 MMOL/L (ref 4–13)
AST SERPL W P-5'-P-CCNC: 18 U/L (ref 13–39)
BASOPHILS # BLD AUTO: 0.08 THOUSANDS/ÂΜL (ref 0–0.1)
BASOPHILS NFR BLD AUTO: 1 % (ref 0–1)
BILIRUB SERPL-MCNC: 0.68 MG/DL (ref 0.2–1)
BUN SERPL-MCNC: 12 MG/DL (ref 5–25)
CALCIUM SERPL-MCNC: 9.1 MG/DL (ref 8.4–10.2)
CHLORIDE SERPL-SCNC: 102 MMOL/L (ref 96–108)
CHOLEST SERPL-MCNC: 198 MG/DL (ref ?–200)
CO2 SERPL-SCNC: 28 MMOL/L (ref 21–32)
CORTIS AM PEAK SERPL-MCNC: 8.3 UG/DL (ref 6.7–22.6)
CREAT SERPL-MCNC: 0.72 MG/DL (ref 0.6–1.3)
EOSINOPHIL # BLD AUTO: 0.04 THOUSAND/ÂΜL (ref 0–0.61)
EOSINOPHIL NFR BLD AUTO: 1 % (ref 0–6)
ERYTHROCYTE [DISTWIDTH] IN BLOOD BY AUTOMATED COUNT: 12.9 % (ref 11.6–15.1)
GFR SERPL CREATININE-BSD FRML MDRD: 101 ML/MIN/1.73SQ M
GLUCOSE P FAST SERPL-MCNC: 95 MG/DL (ref 65–99)
HCT VFR BLD AUTO: 40 % (ref 34.8–46.1)
HDLC SERPL-MCNC: 40 MG/DL
HGB BLD-MCNC: 12.9 G/DL (ref 11.5–15.4)
IMM GRANULOCYTES # BLD AUTO: 0.02 THOUSAND/UL (ref 0–0.2)
IMM GRANULOCYTES NFR BLD AUTO: 0 % (ref 0–2)
LDLC SERPL CALC-MCNC: 124 MG/DL (ref 0–100)
LYMPHOCYTES # BLD AUTO: 2.06 THOUSANDS/ÂΜL (ref 0.6–4.47)
LYMPHOCYTES NFR BLD AUTO: 27 % (ref 14–44)
MCH RBC QN AUTO: 28.2 PG (ref 26.8–34.3)
MCHC RBC AUTO-ENTMCNC: 32.3 G/DL (ref 31.4–37.4)
MCV RBC AUTO: 87 FL (ref 82–98)
MONOCYTES # BLD AUTO: 0.46 THOUSAND/ÂΜL (ref 0.17–1.22)
MONOCYTES NFR BLD AUTO: 6 % (ref 4–12)
NEUTROPHILS # BLD AUTO: 5.04 THOUSANDS/ÂΜL (ref 1.85–7.62)
NEUTS SEG NFR BLD AUTO: 65 % (ref 43–75)
NONHDLC SERPL-MCNC: 158 MG/DL
NRBC BLD AUTO-RTO: 0 /100 WBCS
PLATELET # BLD AUTO: 397 THOUSANDS/UL (ref 149–390)
PMV BLD AUTO: 9.9 FL (ref 8.9–12.7)
POTASSIUM SERPL-SCNC: 4.2 MMOL/L (ref 3.5–5.3)
PROT SERPL-MCNC: 6.9 G/DL (ref 6.4–8.4)
RBC # BLD AUTO: 4.58 MILLION/UL (ref 3.81–5.12)
SODIUM SERPL-SCNC: 138 MMOL/L (ref 135–147)
TRIGL SERPL-MCNC: 172 MG/DL (ref ?–150)
TSH SERPL DL<=0.05 MIU/L-ACNC: 1.34 UIU/ML (ref 0.45–4.5)
WBC # BLD AUTO: 7.7 THOUSAND/UL (ref 4.31–10.16)

## 2025-02-28 PROCEDURE — 80053 COMPREHEN METABOLIC PANEL: CPT

## 2025-02-28 PROCEDURE — 82785 ASSAY OF IGE: CPT

## 2025-02-28 PROCEDURE — 82533 TOTAL CORTISOL: CPT

## 2025-02-28 PROCEDURE — 85025 COMPLETE CBC W/AUTO DIFF WBC: CPT

## 2025-02-28 PROCEDURE — 84443 ASSAY THYROID STIM HORMONE: CPT

## 2025-02-28 PROCEDURE — 80061 LIPID PANEL: CPT

## 2025-02-28 PROCEDURE — 97140 MANUAL THERAPY 1/> REGIONS: CPT | Performed by: PHYSICAL THERAPIST

## 2025-02-28 PROCEDURE — 86003 ALLG SPEC IGE CRUDE XTRC EA: CPT

## 2025-02-28 PROCEDURE — 97110 THERAPEUTIC EXERCISES: CPT | Performed by: PHYSICAL THERAPIST

## 2025-02-28 PROCEDURE — 36415 COLL VENOUS BLD VENIPUNCTURE: CPT

## 2025-02-28 PROCEDURE — 97112 NEUROMUSCULAR REEDUCATION: CPT | Performed by: PHYSICAL THERAPIST

## 2025-02-28 NOTE — PROGRESS NOTES
"Daily Note     Today's date: 2025  Patient name: Sonja Parrish  : 1980  MRN: [id number]  Referring provider: Varinder Carbajal DPM  Dx:   Encounter Diagnosis     ICD-10-CM    1. Left foot pain  M79.672       2. Debility  R53.81       3. 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 She was on her feet all day and was even hopping. Notes worsening of discomfort at involved ankle for 72 hours after event.      Objective: See treatment diary below      Assessment:  Discussed delayed onset muscle soreness with patient. Tolerated treatment well. Pt had anterior ankle stiffness today; implemented anterior tibialis stretch. Positive pain modulation IASTM using graston tools. Continued with strengthening but no plyometrics or impact prescribed. Pt likely had more discomfort secondary to progression of exercises on Friday & functional activities performed on Saturday.  Patient would benefit from continued PT.       Plan: Continue per plan of care. Reduce impact activities and continue with strengthening. Resume gentle impact next session.     Precautions: Anxiety, s/p foot  surgery on 24        Manuals    Left TC joint mobs JK JK   JK JK NB JK JK JK   Left STJ  mobs JK JK   JK JK NB JK JK JK   IASTM to medial ankle and tibia along PTT JK JK JK  JK JK  JK JK JK                Neuro Re-Ed             SLS      10x10\" with FT support 10x10\" with FT support      SLS on foam      10x10\" with FT support  10x10\" with FT support  10x10\" with FT support 10x10\" with FT support 10x10\" with FT support    B wobble board (AP/PA)              Standing BAPS for ROM              Soleus heel raise             Toe yoga              Short foot             Toe abduction              Great toe flexion into TB             Wobble " "board             BAPS             HR with MB adduction iso Red MB 2x10 Red MB 3x10 Red MB 3x10  Red MB 2x10  Red MB 2x10  Red MB 2x10 Red MB 2x10 Red MB 2x10 Red MB 2x10   SL isometric against (dorsiflexion) wall (lean) DL up , SL hold x10 5 seconds DL up , SL hold x10 5 seconds DL up , SL hold x10 5 seconds   10x on ea 10x on ea DL up , SL hold x10 2 sets  DL up , SL hold x10 2 sets  DL up , SL hold x10 5 seconds   Blaze pods SLS clap 4x30\" SLS         4x30\" SLS   Ther Ex             Bicycle  8' L4 8' L4 8' L4  8' L4 8' L4 8' L4 8' L4 8' L4 8' L4   Gastroc stretch with strap             Soleus stretch with strap             4 way ankle TB             Anterior tib stretch   10x10\" L                                                 Ther Activity             SLS with cone taps              POGO hops  30\" with UE support  Hold          Alternating POGO  30\" with UE support Hold          Eccentric heel lowering on leg press             SL HR 2x on L with UE support             Seated HR/TR             Step up 2x10 on BOSU with FT support 2x10 on step with george disc    2x10 on BOSU with FT support 2x10 on BOSU with FT support 2x10 on BOSU with FT support 2x10 on BOSU with FT support 2x10 on BOSU with FT support 2x10 on BOSU with FT support   Mini squats with HR 5 reps of HR per 1 set of squat 5x    5 reps of HR per 1 set of [id number]  5 reps of HR per 1 set of [id number]  5 reps of HR per 1 set of squat 2x  5 reps of HR per 1 set of squat 5x 5 reps of HR per 1 set of squat 5x   Leg press              Toe walks/Heel walks      Length of bar 4x20 feet ea Length of bar 4x20 feet ea      Eccentric lowering DL up and 95% WB lowering 3x10 Biodex to monitoring  DL up and 95% WB lowering 3x10 Biodex to monitoring  DL up and 100% WB lowering 3x10 Biodex to monitoring   DL up and 75%WB lowering 2x10  DL up and 75%WB lowering 2x10 DL up and 75% WB lowering 2x10 DL up and 75% WB lowering 2x10 DL up and 90% WB lowering 2x10 Biodex to " monitoring  DL up and 95% WB lowering 3x10 Biodex to monitoring    SL HR while walking- hand rail  6x10 feet 6x10 feet           Lateral walks with TB             Gait Training                                       Modalities                                         1:1 with PT from 910-955am    HEP:  HEP:  1)Pogo hops: 30 seconds with counter support. 2 sets  2)Alternating Pogo hops: 30 seconds with counter support. 2 sets  3)Single leg isometric hold: Stay up on toes, shift weight on left and hold for 5 seconds. Repeat 10x  4)Walking single leg Heel raise. 20x on each (May need arm support)   5)Heel raise with ball squeeze. Place in between heels. Raise heels. 3 x10  6) Single leg balance: Balance on left  leg. 10 x10”   7)Heel raise with lowering on left leg: Go up with both, shift 100% of weight to left, lower slowly. 3x10   8)Toe walks. Walk on your tip toes for 20 feet. Repeat 4xs

## 2025-03-03 ENCOUNTER — RESULTS FOLLOW-UP (OUTPATIENT)
Dept: FAMILY MEDICINE CLINIC | Facility: CLINIC | Age: 45
End: 2025-03-03

## 2025-03-03 LAB
A ALTERNATA IGE QN: <0.1 KUA/I (ref 0–0.1)
A FUMIGATUS IGE QN: <0.1 KUA/I (ref 0–0.1)
BERMUDA GRASS IGE QN: <0.1 KUA/I (ref 0–0.1)
BOXELDER IGE QN: <0.1 KUA/I (ref 0–0.1)
C HERBARUM IGE QN: <0.1 KUA/I (ref 0–0.1)
CAT DANDER IGE QN: <0.1 KUA/I (ref 0–0.1)
CMN PIGWEED IGE QN: <0.1 KUA/I (ref 0–0.1)
COMMON RAGWEED IGE QN: <0.1 KUA/I (ref 0–0.1)
COTTONWOOD IGE QN: <0.1 KUA/I (ref 0–0.1)
D FARINAE IGE QN: <0.1 KUA/I (ref 0–0.1)
D PTERONYSS IGE QN: <0.1 KUA/I (ref 0–0.1)
DOG DANDER IGE QN: <0.1 KUA/I (ref 0–0.1)
LONDON PLANE IGE QN: <0.1 KUA/I (ref 0–0.1)
MOUSE URINE PROT IGE QN: <0.1 KUA/I (ref 0–0.1)
MT JUNIPER IGE QN: <0.1 KUA/I (ref 0–0.1)
MUGWORT IGE QN: <0.1 KUA/I (ref 0–0.1)
P NOTATUM IGE QN: <0.1 KUA/I (ref 0–0.1)
ROACH IGE QN: <0.1 KUA/I (ref 0–0.1)
SHEEP SORREL IGE QN: <0.1 KUA/I (ref 0–0.1)
SILVER BIRCH IGE QN: <0.1 KUA/I (ref 0–0.1)
TIMOTHY IGE QN: <0.1 KUA/I (ref 0–0.1)
TOTAL IGE SMQN RAST: 4.49 KU/L (ref 0–113)
WALNUT IGE QN: <0.1 KUA/I (ref 0–0.1)
WHITE ASH IGE QN: <0.1 KUA/I (ref 0–0.1)
WHITE ELM IGE QN: <0.1 KUA/I (ref 0–0.1)
WHITE MULBERRY IGE QN: <0.1 KUA/I (ref 0–0.1)
WHITE OAK IGE QN: <0.1 KUA/I (ref 0–0.1)

## 2025-03-04 NOTE — TELEPHONE ENCOUNTER
Patient returned call to office.  Relayed results from Dr. Frias verbatim to patient.  Patient expressed understanding but would like to know WHICH allergies were tested on that panel.    Please contact patient and let her know.

## 2025-03-06 ENCOUNTER — EVALUATION (OUTPATIENT)
Dept: PHYSICAL THERAPY | Facility: CLINIC | Age: 45
End: 2025-03-06
Payer: COMMERCIAL

## 2025-03-06 DIAGNOSIS — Z98.890 STATUS POST FOOT SURGERY: ICD-10-CM

## 2025-03-06 DIAGNOSIS — R53.81 DEBILITY: ICD-10-CM

## 2025-03-06 DIAGNOSIS — M79.672 LEFT FOOT PAIN: Primary | ICD-10-CM

## 2025-03-06 PROCEDURE — 97140 MANUAL THERAPY 1/> REGIONS: CPT | Performed by: PHYSICAL THERAPIST

## 2025-03-06 PROCEDURE — 97112 NEUROMUSCULAR REEDUCATION: CPT | Performed by: PHYSICAL THERAPIST

## 2025-03-06 PROCEDURE — 97110 THERAPEUTIC EXERCISES: CPT | Performed by: PHYSICAL THERAPIST

## 2025-03-06 NOTE — PROGRESS NOTES
[id number]     Today's date: 3/6/2025  Patient name: Sonja Parrish  : 1980  MRN: [id number]  Referring provider: Varinder Carbajal DPM  Dx:   Encounter Diagnosis     ICD-10-CM    1. Left foot pain  M79.672       2. Debility  R53.81       3. 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 Carbajal on 24 (Kidner procedure).  Pt has been walking 2-3 miles. We have implemented  single leg strengthening and gentle plyometrics into POC. Pt continues to have full AROM of the left foot. Upon MMT testing, no significant weakness present, however, she does have functional weakness identified with SL HR. This is much improved from 6 weeks ago. No significant swelling tenderness present at this time. Pt's goal is to return to impact related ADLs and activities. She is progressing in the right direction but would benefit from continued PT to address this primary issue. Thank you for this referral     Understanding of Dx/Px/POC: good   Impairments: abnormal gait, abnormal or restricted ROM, abnormal movement, activity intolerance, impaired physical strength, lacks appropriate home exercise program and pain with function  Functional limitations: ambulation, stair negotiation, squatting, transfers, weight bearing ADLs.  Prognosis: good    Goals  Short Term Goals: to be achieved by 4 weeks  1) Patient to be independent with basic HEP.-MET  2) Decrease pain to 3/10 at its worst.-Partially met   3) Increase left ankle ROM by 5-10 degrees -MET  4) Increase LE strength by 1/2 MMT grade in all deficient planes.-met    Long Term Goals: to be achieved by discharge  1) FOTO equal to or greater than expected.-Partially met  2) Ambulation to improve to maximal level of function-Partially met  3) Stair negotiation will improve to reciprocal.-met  4) Pt to  participate in age appropriate activities without pain-Partially met    Plan  Patient would benefit from: skilled PT  Planned modality interventions: cryotherapy and TENS    Planned therapy interventions: ADL training, manual therapy, motor coordination training, neuromuscular re-education, therapeutic activities, therapeutic exercise, gait training and functional ROM exercises    Frequency:1x per week for 4-6 weeks.  Treatment plan discussed with: patient        Subjective Evaluation    History of Present Illness  Date of surgery: 9/13/2024  Mechanism of injury: surgery  Mechanism of injury: History of Current Injury: Pt is 6.5 weeks s/p excision of accessory navicular bone of left foot. Pt is no longer in splint/brace and currently wearing HOKAs and orthotics (for 2 weeks now). Pt previously had bunionectomy in both feet (~17 to 20 years ago). Pt feels she is progressing. She continues to have discomfort at medial foot/ankle. She reports worsening swelling when on her feet all day. She did just return from vacation (football game at [facility]) and was on a plane.  She knows her walking gait needs improvement as she feels restricted at involved foot/ankle.  Pain location/Descriptors: Dull pain at medial aspect of ankle and around the ankle of left foot.   Aggravating factors: Weight bearing activities, prolonged standing, walking.   Easing factors: Advil  24 HR pattern: Worse with activity.   Imaging: XR:  IMPRESSION:  Redemonstrated postop changes of left bunionectomy with metallic screw fixation across a healed/healing first metatarsal neck osteotomy and metallic wire fixation along the lateral margin of the left great toe proximal phalanx.    Special Questions: Numbness present over distal tibia of left foot which is improving.   Patient goals:  Energy course on May 31st. Would like to return to walk and exercise.   Hobbies/Interest: Try to regularly exercise but hasn't since the injury    Occupation:       Patient Goals  Patient goals for therapy: decreased pain, decreased edema, improved balance, increased motion, increased strength and independence with ADLs/IADLs    Pain  Current pain ratin  At worst pain rating: 3    Treatments  No previous or current treatments        Objective     Observations   Left Ankle/Foot   Positive for edema, effusion and incision.     Additional Observation Details  Incision intact     Active Range of Motion   Left Ankle/Foot   Dorsiflexion (ke): 12 degrees   Plantar flexion: 65 degrees   Inversion: 45 degrees   Eversion: 15 degrees     Right Ankle/Foot   Dorsiflexion (ke): 10 degrees   Plantar flexion: 70 degrees   Inversion: 22 degrees   Eversion: 5 degrees     Passive Range of Motion   Left Hip   Normal passive range of motion    Right Hip   Normal passive range of motion    Strength/Myotome Testing     Left Hip   Planes of Motion   Flexion: 5  Extension: 5  Abduction: 5    Right Hip   Planes of Motion   Flexion: 5  Extension: 5  Abduction: 5    Left Knee   Flexion: 5  Extension: 5    Right Knee   Flexion: 5  Extension: 5    Left Ankle/Foot   Dorsiflexion: 5  Plantar flexion: 4+ (performed NWB)  Inversion: 4+  Eversion: 5    Right Ankle/Foot   Normal strength    Tests     Additional Tests Details  Moderate restriction in hamstrings and gastroc/soleus complex on L  TC joint mobility deficit on left       Swelling   Left Ankle/Foot   Metatarsal heads: 21.5 cm  Figure 8: 48 cm  Malleoli: 22 cm    Ambulation     Observational Gait   Gait: antalgic   Decreased walking speed and stride length.     Additional Observational Gait Details  Shod gait:  (Hokas with orthotics)    decreased terminal stance and push off GT on the left     Single leg heel raise:   Left: 14 total (decreased power after 8 reps)  Right: 23 total     Single leg balance:   Left: 23 seconds with increased postural sway (severe)   Right: 30+ seconds with minimal sway    Double limb  "POGO hops:   30 seconds without pain    Single limb POGO hops:   L: Unable to perform without UE support- then able to perform for 10 seconds  R:  15 seconds without UE support         Precautions: Anxiety, s/p foot  surgery on 9/13/24        Manuals 2/13 2/21 2/28 3/6     1/31 2/4   Left TC joint mobs JK JK       JK JK   Left STJ  mobs JK ANA M VIRAMONTES JK   IASTM to medial ankle and tibia along PTT JK JK JK JK      JK JK                Neuro Re-Ed             SLS             SLS on foam          10x10\" with FT support    B wobble board (AP/PA)              Standing BAPS for ROM              Soleus heel raise             Toe yoga              Short foot             Toe abduction              Great toe flexion into TB             Wobble board             BAPS             HR with MB adduction iso Red MB 2x10 Red MB 3x10 Red MB 3x10      Red MB 2x10 Red MB 2x10   SL isometric against (dorsiflexion) wall (lean) DL up , SL hold x10 5 seconds DL up , SL hold x10 5 seconds DL up , SL hold x10 5 seconds      DL up , SL hold x10 2 sets  DL up , SL hold x10 5 seconds   Blaze pods SLS clap 4x30\" SLS         4x30\" SLS   Ther Ex             Bicycle  8' L4 8' L4 8' L4 8' L4     8' L4 8' L4   Gastroc stretch with strap             Soleus stretch with strap             4 way ankle TB             Anterior tib stretch   10x10\" L                                                 Ther Activity             SLS with cone taps              POGO hops  30\" with UE support  Hold          Alternating POGO  30\" with UE support Hold          Eccentric heel lowering on leg press             SL HR 2x on L with UE support             Seated HR/TR             Step up 2x10 on BOSU with FT support 2x10 on step with george disc        2x10 on BOSU with FT support 2x10 on BOSU with FT support   Mini squats with HR 5 reps of HR per 1 set of squat 5x        5 reps of HR per 1 set of squat 5x 5 reps of HR per 1 set of squat 5x   Leg press              Toe " walks/Heel walks             Eccentric lowering DL up and 95% WB lowering 3x10 Biodex to monitoring  DL up and 95% WB lowering 3x10 Biodex to monitoring  DL up and 100% WB lowering 3x10 Biodex to monitoring       DL up and 90% WB lowering 2x10 Biodex to monitoring  DL up and 95% WB lowering 3x10 Biodex to monitoring    SL HR while walking- hand rail  6x10 feet 6x10 feet           Lateral walks with TB             Gait Training                                       Modalities                                         1:1 with PT from 945-1030am      HEP:  1)Pogo hops: 30 seconds with counter support. 2 sets  2)Alternating Pogo hops: 30 seconds with counter support. 2 sets  3)Single leg isometric hold: Stay up on toes, shift weight on left and hold for 5 seconds. Repeat 10x  4)Walking single leg Heel raise. 20x on each (May need arm support)   5)Heel raise with ball squeeze. Place in between heels. Raise heels. 3 x10  6) Single leg balance: Balance on left  leg. 10 x10”   7)Heel raise with lowering on left leg: Go up with both, shift 100% of weight to left, lower slowly. 3x10   8)Toe walks. Walk on your tip toes for 20 feet. Repeat 4xs

## 2025-03-10 ENCOUNTER — TELEMEDICINE (OUTPATIENT)
Dept: OTHER | Facility: HOSPITAL | Age: 45
End: 2025-03-10
Payer: COMMERCIAL

## 2025-03-10 DIAGNOSIS — F41.9 ANXIETY: ICD-10-CM

## 2025-03-10 DIAGNOSIS — S61.214A LACERATION OF RIGHT RING FINGER WITHOUT FOREIGN BODY, NAIL DAMAGE STATUS UNSPECIFIED, INITIAL ENCOUNTER: Primary | ICD-10-CM

## 2025-03-10 PROCEDURE — 99212 OFFICE O/P EST SF 10 MIN: CPT | Performed by: NURSE PRACTITIONER
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
## 2025-03-10 NOTE — PATIENT INSTRUCTIONS
At this time the wound looks good.  I would continue with antibiotic ointment.  Cool compresses.  If the sharp pain does not resolve have wound rechecked.  Go to ER with any worsening symptoms.

## 2025-03-11 ENCOUNTER — EVALUATION (OUTPATIENT)
Dept: PHYSICAL THERAPY | Age: 45
End: 2025-03-11
Payer: COMMERCIAL

## 2025-03-11 ENCOUNTER — OFFICE VISIT (OUTPATIENT)
Dept: PHYSICAL THERAPY | Facility: CLINIC | Age: 45
End: 2025-03-11
Payer: COMMERCIAL

## 2025-03-11 DIAGNOSIS — M79.672 LEFT FOOT PAIN: Primary | ICD-10-CM

## 2025-03-11 DIAGNOSIS — R53.81 DEBILITY: ICD-10-CM

## 2025-03-11 DIAGNOSIS — Z98.890 STATUS POST FOOT SURGERY: ICD-10-CM

## 2025-03-11 PROCEDURE — 97140 MANUAL THERAPY 1/> REGIONS: CPT | Performed by: PHYSICAL THERAPIST

## 2025-03-11 PROCEDURE — 97110 THERAPEUTIC EXERCISES: CPT | Performed by: PHYSICAL THERAPIST

## 2025-03-11 PROCEDURE — 97530 THERAPEUTIC ACTIVITIES: CPT | Performed by: PHYSICAL THERAPIST

## 2025-03-11 PROCEDURE — 97760 ORTHOTIC MGMT&TRAING 1ST ENC: CPT | Performed by: PHYSICAL THERAPIST

## 2025-03-11 RX ORDER — ESCITALOPRAM OXALATE 10 MG/1
5 TABLET ORAL DAILY
Qty: 45 TABLET | Refills: 1 | Status: SHIPPED | OUTPATIENT
Start: 2025-03-11

## 2025-03-11 NOTE — PROGRESS NOTES
PT Evaluation     Today's date: 3/11/2025  Patient name: Sonja Parrish  : 1980  MRN: [id number]  Referring provider: No ref. provider found  Dx:   Encounter Diagnosis     ICD-10-CM    1. Left foot pain  M79.672       2. Status post foot surgery  Z98.890           Start Time: 1700  Stop Time: 1745  Total time in clinic (min): 45 minutes    Assessment details: Sonja is a pleasant 44 y.o. female who presents for physical therapy evaluation following L foot Keider procedure. Upon evaluation she demosntrates decreased ROM, decreased foot and ankle strength, decreased proprioception and altered gait mechanics. 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       Impairments:  1) decreased ROM  2) decreased TCJ and subtalar mobility  3) decreased foot and ankle strength  4) altered gait mechanics     Goals  STG's to be achieved in 4 weeks:  1) Patient will demonstrate normal pain free LE ROM.   2) Patient will improve LE strength by 1/2 muscle grade.   3) Patient will demonstrate normal gait pattern ascending and descending stairs.      LTG's to be achieved in 8 weeks:  1) Patient will be independent and compliant with HEP.   2) Patient will report demonstrate normal pain free gait mechanics for 1 mile or greater.   3) Patient will score 75 or greater on FOTO.      Orthotic goals:  1) Patient will have custom foot orthoses fitted to her shoe.   2) Patient will be compliant with break in period of custom foot orthoses as prescribed by PT.   3) Patient will be compliant with custom foot orthoses use as prescribed by PT.                Subjective Evaluation     History of Present Illness  Mechanism of injury: Patient reports having  increased foot pain along medial arch of foot and 30 minutes to one hour into standing + walking as a . Patient would also like to return to playing pickleball and performing a  Ihsan run in 2.5 months        Objective      Active Range of Motion   Left Ankle/Foot   Dorsiflexion (ke): 12 degrees   Plantar flexion: 65 degrees   Inversion: 45 degrees   Eversion: 15 degrees      Right Ankle/Foot   Dorsiflexion (ke): 10 degrees   Plantar flexion: 70 degrees   Inversion: 22 degrees   Eversion: 5 degrees      Strength/Myotome Testing      Additional Strength Details  L Ankle: anterior tib-(4/5), posterior tib-(4/5), fib longus/brevis-(4/5), fib tertius-(4/5)  R Ankle: DF- anterior tib-(4+/5), posterior tib-(4/5), fib longus/brevis-(4/5), fib tertius-(5/5)  Foot:      Poor neuromuscular control of flexors        General Comments:        Ankle/Foot Comments   Pes planus  Early excessive pronator B/L     Foot posture Index:  R- calcaneal valgus (1), too many toes (1), malleoli (1), talonavicular bulge (1), talar dome (0), medial longitudinal arch (0) Total Score R- 4  L- calcaneal valgus (1), too many toes (0), malleoli (1), talonavicular bulge (0), talar dome (0), medial longitudinal arch (0) Total Score L- 2       Single leg heel raise:   Left: 14 total (decreased power after 8 reps)  Right: 23 total     Single leg balance:   Left: 23 seconds with increased postural sway (severe)   Right: 30+ seconds with minimal sway           Precautions: Anxiety, s/p foot  surgery on 9/13/24      Manuals                                                                 Neuro Re-Ed                                                                                                        Ther Ex                                                                                                                     Ther Activity                                       Gait Training                                       Modalities

## 2025-03-11 NOTE — PROGRESS NOTES
"Daily Note     Today's date: 3/11/2025  Patient name: Sonja Parrish  : 1980  MRN: [id number]  Referring provider: Varinder Carbajal DPM  Dx: No diagnosis found.               Subjective: Pt reports having tightness at anterior ankle today. No other new complaints. Pt will see Atif Contreras DPT at [facility] for orthotics evaluation. Pt did  miles at [facility].       Objective: See treatment diary below      Assessment: Implemented gentle plyometrics with good stability and control. Tolerated treatment well. No adverse soreness of discomfort present. Patient would benefit from continued PT.      Plan: Continue per plan of care.      Precautions: Anxiety, s/p foot  surgery on 24        Manuals 2/13 2/21 2/28 3/6 3/11    1/31 2/4   Left TC joint mobs JK JK       JK JK   Left STJ  mobs JK JK       JK JK   IASTM to medial ankle and tibia along PTT JK JK JK JK  JK    JK JK                Neuro Re-Ed             SLS             SLS on foam          10x10\" with FT support    B wobble board (AP/PA)              Standing BAPS for ROM              Soleus heel raise             Toe yoga              Short foot             Toe abduction              Great toe flexion into TB             Wobble board             BAPS             HR with MB adduction iso Red MB 2x10 Red MB 3x10 Red MB 3x10      Red MB 2x10 Red MB 2x10   SL isometric against (dorsiflexion) wall (lean) DL up , SL hold x10 5 seconds DL up , SL hold x10 5 seconds DL up , SL hold x10 5 seconds      DL up , SL hold x10 2 sets  DL up , SL hold x10 5 seconds   Blaze pods SLS clap 4x30\" SLS         4x30\" SLS   Ther Ex             Bicycle  8' L4 8' L4 8' L4 8' L4 8' L4     8' L4 8' L4   Gastroc stretch with strap     Against wall 4x30\"        Soleus stretch with strap             4 way ankle TB             Anterior tib stretch   10x10\" L  L 4x30\"                                               Ther Activity             SLS with cone taps              POGO hops  " "30\" with UE support  Hold          Alternating POGO  30\" with UE support Hold          Eccentric heel lowering on leg press             SL HR 2x on L with UE support             Seated HR/TR             Step up 2x10 on BOSU with FT support 2x10 on step with george disc        2x10 on BOSU with FT support 2x10 on BOSU with FT support   Mini squats with HR 5 reps of HR per 1 set of squat 5x        5 reps of HR per 1 set of squat 5x 5 reps of HR per 1 set of squat 5x   Leg press              Toe walks/Heel walks             Eccentric lowering DL up and 95% WB lowering 3x10 Biodex to monitoring  DL up and 95% WB lowering 3x10 Biodex to monitoring  DL up and 100% WB lowering 3x10 Biodex to monitoring       DL up and 90% WB lowering 2x10 Biodex to monitoring  DL up and 95% WB lowering 3x10 Biodex to monitoring    SL HR while walking- hand rail  6x10 feet 6x10 feet           Quick step ups      30\" x3        Box jumps     6\" 2x10         Lateral hops     10x total 2 sets        Lateral walks with TB             Gait Training                                       Modalities                                         1:1 with PT from 310-348pm      HEP:  1)Pogo hops: 30 seconds with counter support. 2 sets  2)Alternating Pogo hops: 30 seconds with counter support. 2 sets  3)Single leg isometric hold: Stay up on toes, shift weight on left and hold for 5 seconds. Repeat 10x  4)Walking single leg Heel raise. 20x on each (May need arm support)   5)Heel raise with ball squeeze. Place in between heels. Raise heels. 3 x10  6) Single leg balance: Balance on left  leg. 10 x10”   7)Heel raise with lowering on left leg: Go up with both, shift 100% of weight to left, lower slowly. 3x10   8)Toe walks. Walk on your tip toes for 20 feet. Repeat 4xs                                                  "

## 2025-03-18 ENCOUNTER — OFFICE VISIT (OUTPATIENT)
Dept: PHYSICAL THERAPY | Facility: CLINIC | Age: 45
End: 2025-03-18
Payer: COMMERCIAL

## 2025-03-18 DIAGNOSIS — M79.672 LEFT FOOT PAIN: Primary | ICD-10-CM

## 2025-03-18 DIAGNOSIS — R53.81 DEBILITY: ICD-10-CM

## 2025-03-18 DIAGNOSIS — Z98.890 STATUS POST FOOT SURGERY: ICD-10-CM

## 2025-03-18 PROCEDURE — 97110 THERAPEUTIC EXERCISES: CPT | Performed by: PHYSICAL THERAPIST

## 2025-03-18 PROCEDURE — 97530 THERAPEUTIC ACTIVITIES: CPT | Performed by: PHYSICAL THERAPIST

## 2025-03-18 PROCEDURE — 97140 MANUAL THERAPY 1/> REGIONS: CPT | Performed by: PHYSICAL THERAPIST

## 2025-03-18 NOTE — PROGRESS NOTES
"Daily Note     Today's date: 3/18/2025  Patient name: Sonja Parrish  : 1980  MRN: [id number]  Referring provider: Varinder Carbajal DPM  Dx:   Encounter Diagnosis     ICD-10-CM    1. Left foot pain  M79.672       2. Status post foot surgery  Z98.890       3. Debility  R53.81                      Subjective: Pt reports having LE soreness after exercising with her friend who is a . However, denies much foot or ankle pain. Does still have some numbness over lateral tibial region from nerve block.       Objective: See treatment diary below      Assessment: Progressed plan of care. Implemented agility ladder with good tolerance and minimal pain. PT fatigues quickly. Pt is 6 months post op. Plan to implement shuffling into POC next visit. Tolerated treatment well. Patient would benefit from continued PT      Plan: Continue per plan of care.      Precautions: Anxiety, s/p foot  surgery on 24      Manuals 2/13 2/21 2/28 3/6 3/11 3/18   1/31 2/4   Left TC joint mobs JK JK       JK JK   Left STJ  mobs JK JK       JK JK   IASTM to medial ankle and tibia along PTT JK JK JK JK  JK JK   JK JK                Neuro Re-Ed             SLS             SLS on foam          10x10\" with FT support    B wobble board (AP/PA)              Standing BAPS for ROM              Soleus heel raise             Toe yoga              Short foot             Toe abduction              Great toe flexion into TB             Wobble board             BAPS             HR with MB adduction iso Red MB 2x10 Red MB 3x10 Red MB 3x10      Red MB 2x10 Red MB 2x10   SL isometric against (dorsiflexion) wall (lean) DL up , SL hold x10 5 seconds DL up , SL hold x10 5 seconds DL up , SL hold x10 5 seconds      DL up , SL hold x10 2 sets  DL up , SL hold x10 5 seconds   Blaze pods SLS clap 4x30\" SLS         4x30\" SLS   Ther Ex             Bicycle  8' L4 8' L4 8' L4 8' L4 8' L4  8' L4   8' L4 8' L4   Gastroc stretch with strap     Against wall 4x30\" SB " "4x30\"       Soleus stretch with strap             4 way ankle TB             Anterior tib stretch   10x10\" L  L 4x30\" L 4x30\"                                              Ther Activity             SLS with cone taps              POGO hops  30\" with UE support  Hold          Alternating POGO  30\" with UE support Hold          Eccentric heel lowering on leg press             SL HR 2x on L with UE support      2x10 on blue foam        Seated HR/TR             Step up 2x10 on BOSU with FT support 2x10 on step with george disc        2x10 on BOSU with FT support 2x10 on BOSU with FT support   Mini squats with HR 5 reps of HR per 1 set of squat 5x        5 reps of HR per 1 set of squat 5x 5 reps of HR per 1 set of squat 5x   Leg press              Toe walks/Heel walks             Eccentric lowering DL up and 95% WB lowering 3x10 Biodex to monitoring  DL up and 95% WB lowering 3x10 Biodex to monitoring  DL up and 100% WB lowering 3x10 Biodex to monitoring       DL up and 90% WB lowering 2x10 Biodex to monitoring  DL up and 95% WB lowering 3x10 Biodex to monitoring    SL HR while walking- hand rail  6x10 feet 6x10 feet           Quick step ups      30\" x3        Box jumps     6\" 2x10         Lateral hops     10x total 2 sets 2x10        Agility ladder      2 in, 1 in, laterals, lateral hops, broad jumps       Lateral walks with TB             Gait Training                                       Modalities                                       1:1 with PT from [id number]       "

## 2025-03-25 ENCOUNTER — OFFICE VISIT (OUTPATIENT)
Dept: PHYSICAL THERAPY | Facility: CLINIC | Age: 45
End: 2025-03-25
Payer: COMMERCIAL

## 2025-03-25 DIAGNOSIS — Z98.890 STATUS POST FOOT SURGERY: ICD-10-CM

## 2025-03-25 DIAGNOSIS — R53.81 DEBILITY: ICD-10-CM

## 2025-03-25 DIAGNOSIS — M79.672 LEFT FOOT PAIN: Primary | ICD-10-CM

## 2025-03-25 PROCEDURE — 97530 THERAPEUTIC ACTIVITIES: CPT | Performed by: PHYSICAL THERAPIST

## 2025-03-25 PROCEDURE — 97110 THERAPEUTIC EXERCISES: CPT | Performed by: PHYSICAL THERAPIST

## 2025-03-25 PROCEDURE — 97140 MANUAL THERAPY 1/> REGIONS: CPT | Performed by: PHYSICAL THERAPIST

## 2025-03-25 NOTE — PROGRESS NOTES
PT Evaluation     Today's date: 3/26/2025  Patient name: Sonja Parrish  : 1980  MRN: [id number]  Referring provider: León Fang, PT  Dx:   Encounter Diagnosis     ICD-10-CM    1. Numbness and tingling in right hand  R20.0     R20.2       2. Cervical radiculopathy  M54.12       3. Carpal tunnel syndrome of right wrist  G56.01           Start Time: 0900  Stop Time: 945  Total time in clinic (min): 45 minutes    Assessment  Impairments: abnormal muscle firing, abnormal muscle tone, abnormal or restricted ROM, activity intolerance, impaired physical strength, lacks appropriate home exercise program and pain with function    Assessment details: Sonja Parrish is a 44 y.o. female who presents with signs and symptoms consistent of persistent tingling in the right hand (5th, 4th, 3rd, and 2nd digit of palmar right hand) and to a lesser extent left hand. Pt has attempted injections, OT hand therapy, and night splinting without significant improvements. This condition has been ongoing since . Assessed cervical spine and elbow. Pt is neurologically intact. She does present with symptoms of CTS and cubital tunnel syndrome; however, also has positive cervical spine radiculopathy cluster. Pt seems to be having both cervical and wrist involvement in her paresthesia.   Patient presents with decreased neck ROM, positive spurlings test, increase paresthesia with traction, positive ULTT-a, wrist ROM deficits, and positive Tinels. Due to these impairments, Patient has difficulty performing fine motor activities, grasping, and gripping without reproduction of numbness or tingling. Patient would benefit from skilled physical therapy to address the impairments, improve their level of function, and to improve their overall quality of life.    Primary movement impairments:   1) Positive C/s radic cluster: ULTT-a, Spurlings, traction (increases)   2) Positive tinels B  3) Cervical ROM and wrist ROM deficits    Understanding of  Dx/Px/POC: good     Prognosis: good    Goals  STG: to be achieved in 4-6 weeks  1)Reduce parethesia by 50% in bilateral hands  2)Improve pain free  strength by symmetrical bilaterally  3)Improve flexibility to wrist muscles to WNL   4)Improve cervical spine ROM by 5-10 degrees    LTG: to be achieved at discharge   1)Improve FOTO to greater than or equal to expected  2)Improve ADL's in related activities to maximal level of function   3)Improve  strength to pain free with maximal force.    4)Improve ULTT-A mobility by 5-10 degrees    Plan  Planned modality interventions: low level laser therapy    Planned therapy interventions: IASTM, joint mobilization, manual therapy, neuromuscular re-education, therapeutic activities, therapeutic exercise and home exercise program    Frequency: 1x per week for 4 weeks.  Plan details: Implement treatment along side of progressing foot/ankle rehabilitation. (I.e. treat both hand and foot next upcoming sessions).         Subjective Evaluation    History of Present Illness  Mechanism of injury: History of Current Injury: Pt is a self referral for persistent right hand numbness/tingling and trigger finger of the 5th digit. She was previously diagnosed with R CTS and R cubital tunnel syndrome.  Pt has attempted hand therapy in the past without significant relief. She is currently a patient s/p foot surgery and requests an evaluation of her numbness/tingling in right hand. Pt was previously seen by Dr. Qureshi in 2022 and has attempted night splints without significant relief. She notes that she received injections in the 5th digit, but this was ultimately not successful. Her goal is to avoid surgery at all cost which bring her into to see me today.   Pain location/Descriptors: Tingling in 5th, 4th, 3rd, and 2nd on palmar side of the right hand.   Pt is right hand dominant. She notes more weakness on the right side (I.e: opening jars, fine motor)  Aggravating factors: Gripping,  squeezing, fine motor activities, drawing, slouching forward, driving  Easing factors: rest, shaking the hand, sitting up right/positional changes at the spine  24 HR pattern: Worse in the morning  Imaging: [facility] US ruled in CTS in 2022.   Special Questions: + for numbness & tingling   Patient goals:  Return to normal lifestyle without N&T or pain  Hobbies/Interest: Exercise,         Patient Goals  Patient goals for therapy: decreased pain, increased strength, increased motion and independence with ADLs/IADLs    Pain  Pain scale: mild tingling.  Pain scale at highest: Numbness severe.      Diagnostic Tests  Ultrasound findings: abnormalTreatments  Previous treatment: injection treatment, medication and immobilization        Objective     Active Range of Motion   Cervical/Thoracic Spine       Cervical  Subcranial protraction:  WFL   Subcranial retraction: Active cervical subcranial retraction: Unable to reach neutral.   Flexion: 45 degrees   Extension: 50 degrees      Left lateral flexion: 35 degrees      Right lateral flexion: 35 degrees      Left rotation: 62 degrees  Right rotation: 65 degrees       Left Wrist   Wrist flexion: 65 degrees   Wrist extension: 55 degrees     Right Wrist   Wrist flexion: 60 degrees   Wrist extension: 50 degrees     Additional Active Range of Motion Details  Mild hand numbness present with left rotation and extension    Strength/Myotome Testing   Cervical Spine     Left   Interossei strength (t1): 5    Right   Interossei strength (t1): 5    Left Shoulder     Planes of Motion   Flexion: 5   Abduction: 5   External rotation at 0°: 5     Right Shoulder     Planes of Motion   Flexion: 5   Abduction: 5   External rotation at 0°: 5     Left Elbow   Flexion: 5  Extension: 5    Right Elbow   Flexion: 5  Extension: 5    Left Wrist/Hand   Wrist extension: 4  Wrist flexion: 4  Thumb extension: 4     (2nd hand position)     Trial 1: 55    Right Wrist/Hand   Wrist extension: 4  Wrist flexion:  4  Thumb extension: 4     (2nd hand position)     Trial 1: 55    Tests   Cervical   Negative alar ligament test, Sharp-Xiomy test and VBI.     Left   Positive Spurling's Test A.   Negative Spurling's Test B.     Right   Positive Spurling's Test B.   Negative Spurling's Test A.     Left Shoulder   Positive ULTT1.     Right Shoulder   Positive ULTT1.     Left Elbow   Positive Tinel's sign (cubital tunnel).     Right Elbow   Positive Tinel's sign (cubital tunnel).     Left Wrist/Hand   Negative Tinel's sign (medial nerve) and Tinel's sign (radial tunnel).     Right Wrist/Hand   Positive Tinel's sign (medial nerve).     Additional Tests Details  Cervical lateral glides: Increased hand tingling - bilaterally (hypomobile)  Cervical rotational: Increased hand tingling- bilaterally     OA mobility: Poor  AA mobility: Poor   +Headaches     Traction: increases paresthesia in B hands  +Spurlings A/B  +ULTTA B   ULTT-A: positive:  L: 45 deg from elbow ext  R: 70 deg from elbow ext             Precautions: Anxiety, s/p foot  surgery on 9/13/24      Manuals 3/26            IASTM using LLL to carpal tunnel B 1.5 minutes each            STM to CT             Cervical lateral glides             SOR with light traction              Neuro Re-Ed             Seated median N glides 10x on ea - neutral head            Cervical retraction                                                                               Ther Ex             UBE             Wrist extension stretch              UT stretch             LS stretch              Cervical snag-rotation              Sup/pro with flexbar                                       Ther Activity                                       Gait Training                                       Modalities                                          [email or web address]  Access Code: [email or web address]

## 2025-03-25 NOTE — PROGRESS NOTES
Daily Note     Today's date: 3/25/2025  Patient name: Sonja Parrish  : 1980  MRN: [id number]  Referring provider: Varinder Carbajal DPM  Dx:   Encounter Diagnosis     ICD-10-CM    1. Left foot pain  M79.672       2. Status post foot surgery  Z98.890       3. Debility  R53.81                      Subjective: Pt offers no new complaints today to start treatment. She worked out with her  without any significant discomfort this week.         Objective: See treatment diary below      Assessment: Implemented blazepods into plan of care for quick shuffling and pivoting. Patient was tolerating treatment well until feeling sharp lateral ankle pain after completing the agility ladder. Pt was able to perform light plyometrics over the last 2 weeks without pain or discomfort. Terminated this exercise after feeling discomfort. Quickly re-assessed ankle; normal ligamentous testing, 5/5 MMT PF, DF, and eversion, 4/5 MMT inversion. Painful palpation to lateral achilles.  Pt likely irritated achilles with plyometric exercises. Gave patient gentle stretching and resumed light TB exercises without pain. Walking after several steps help improve discomfort. Plan to resume exercises next session as tolerated. Pt can consider icing region. No plyometric exercise recommended on consecutive days. Despite irritation, no specific injury present clinically.       Plan: Continue per plan of care. Resume plyometric exercises as tolerated.      Precautions: Anxiety, s/p foot  surgery on 24      Manuals  3/6 3/11 3/18 3/25      Left TC joint mobs ANA M JK           Left STJ  mobs ANA M VIRAMONTES           IASTM to medial ankle and tibia along PTT RHETTK JK JK JK  JK JK JK                   Neuro Re-Ed             SLS             SLS on foam              B wobble board (AP/PA)              Standing BAPS for ROM              Soleus heel raise             Toe yoga              Short foot             Toe abduction              Great toe  "flexion into TB             Wobble board             BAPS             HR with MB adduction iso Red MB 2x10 Red MB 3x10 Red MB 3x10          SL isometric against (dorsiflexion) wall (lean) DL up , SL hold x10 5 seconds DL up , SL hold x10 5 seconds DL up , SL hold x10 5 seconds          Blaze pods SLS clap 4x30\" SLS            Ther Ex             Bicycle  8' L4 8' L4 8' L4 8' L4 8' L4  8' L4 8'L4      Gastroc stretch with strap     Against wall 4x30\" SB 4x30\" SB 4x30\"      Soleus stretch with strap             4 way ankle TB             Anterior tib stretch   10x10\" L  L 4x30\" L 4x30\" L 4x30\"      Gastro stretch with towel       4x30\" L       Ankle DF tb       30x gtb                    Ther Activity             SLS with cone taps              POGO hops  30\" with UE support  Hold          Alternating POGO  30\" with UE support Hold          Eccentric heel lowering on leg press             SL HR 2x on L with UE support      2x10 on blue foam        Seated HR/TR             Step up 2x10 on BOSU with FT support 2x10 on step with george disc            Mini squats with HR 5 reps of HR per 1 set of squat 5x            Leg press              Toe walks/Heel walks             Eccentric lowering DL up and 95% WB lowering 3x10 Biodex to monitoring  DL up and 95% WB lowering 3x10 Biodex to monitoring  DL up and 100% WB lowering 3x10 Biodex to monitoring           SL HR while walking- hand rail  6x10 feet 6x10 feet           Quick step ups      30\" x3        Box jumps     6\" 2x10         Lateral hops     10x total 2 sets 2x10        Agility ladder      2 in, 1 in, laterals, lateral hops, broad jumps 2in, 1 in- terminated early d/t lateral ankle paiin      Shuffle sprints blaze pods       3x45 \"       Fast feet       3x30\"       Lateral walks with TB             Gait Training                                       Modalities                                       1:1 with PT from 245-323PM         "

## 2025-03-26 ENCOUNTER — OFFICE VISIT (OUTPATIENT)
Dept: PHYSICAL THERAPY | Facility: CLINIC | Age: 45
End: 2025-03-26
Payer: COMMERCIAL

## 2025-03-26 DIAGNOSIS — G56.01 CARPAL TUNNEL SYNDROME OF RIGHT WRIST: ICD-10-CM

## 2025-03-26 DIAGNOSIS — R20.0 NUMBNESS AND TINGLING IN RIGHT HAND: Primary | ICD-10-CM

## 2025-03-26 DIAGNOSIS — R20.2 NUMBNESS AND TINGLING IN RIGHT HAND: Primary | ICD-10-CM

## 2025-03-26 DIAGNOSIS — M54.12 CERVICAL RADICULOPATHY: ICD-10-CM

## 2025-03-26 PROCEDURE — 97162 PT EVAL MOD COMPLEX 30 MIN: CPT | Performed by: PHYSICAL THERAPIST

## 2025-03-26 PROCEDURE — 97110 THERAPEUTIC EXERCISES: CPT | Performed by: PHYSICAL THERAPIST

## 2025-04-01 ENCOUNTER — OFFICE VISIT (OUTPATIENT)
Dept: PHYSICAL THERAPY | Facility: CLINIC | Age: 45
End: 2025-04-01
Payer: COMMERCIAL

## 2025-04-01 DIAGNOSIS — M54.12 CERVICAL RADICULOPATHY: ICD-10-CM

## 2025-04-01 DIAGNOSIS — R53.81 DEBILITY: Primary | ICD-10-CM

## 2025-04-01 DIAGNOSIS — G56.01 CARPAL TUNNEL SYNDROME OF RIGHT WRIST: ICD-10-CM

## 2025-04-01 DIAGNOSIS — Z98.890 STATUS POST FOOT SURGERY: ICD-10-CM

## 2025-04-01 DIAGNOSIS — M79.672 LEFT FOOT PAIN: ICD-10-CM

## 2025-04-01 DIAGNOSIS — R20.2 NUMBNESS AND TINGLING IN RIGHT HAND: ICD-10-CM

## 2025-04-01 DIAGNOSIS — R20.0 NUMBNESS AND TINGLING IN RIGHT HAND: ICD-10-CM

## 2025-04-01 PROCEDURE — 97110 THERAPEUTIC EXERCISES: CPT | Performed by: PHYSICAL THERAPIST

## 2025-04-01 PROCEDURE — 97140 MANUAL THERAPY 1/> REGIONS: CPT | Performed by: PHYSICAL THERAPIST

## 2025-04-01 PROCEDURE — 97112 NEUROMUSCULAR REEDUCATION: CPT | Performed by: PHYSICAL THERAPIST

## 2025-04-01 NOTE — PROGRESS NOTES
"Daily Note     Today's date: 2025  Patient name: Sonja Parrish  : 1980  MRN: [id number]  Referring provider: Varinder Carbajal DPM  Dx:   Encounter Diagnosis     ICD-10-CM    1. Debility  R53.81       2. Status post foot surgery  Z98.890       3. Numbness and tingling in right hand  R20.0     R20.2       4. Cervical radiculopathy  M54.12       5. Carpal tunnel syndrome of right wrist  G56.01       6. Left foot pain  M79.672           Start Time: 1445  Stop Time: 1545  Total time in clinic (min): 60 minutes    Subjective: Pt denies any symptoms in the achilles but does feel more symptoms near her bunion repair.       Objective: See treatment diary below      Assessment: Resumed plyometric loading for LE and implemented treatment for cervical radic/CTS. Tolerated treatment well. Patient would benefit from continued PT.       Plan: Continue per plan of care.      Precautions: Anxiety, s/p foot  surgery on 24      Manuals 3/26 4/1           IASTM using LLL to carpal tunnel B 1.5 minutes each 1.5 minutes            STM to CT             Cervical lateral glides             SOR with light traction              Neuro Re-Ed             Seated median N glides 10x on ea - neutral head 20x 3\" arm propped on table            Cervical retraction                                                                               Ther Ex             UBE             Wrist extension stretch              UT stretch  10x5\" B           LS stretch              Cervical snag-rotation   10x5\" B           Sup/pro with flexbar  2x10 rtb                                      Ther Activity                                       Gait Training                                       Modalities                                         Manuals 2/13 2/21 2/28 3/6 3/11 3/18 3/25 4/1     Left TC joint mobs JK JK           Left STJ  mobs JK JK           IASTM to medial ankle and tibia along PTT JK JK JK JK  JK JK JK JK                   Neuro " "Re-Ed             SLS             SLS on foam              B wobble board (AP/PA)              Standing BAPS for ROM              Soleus heel raise             Toe yoga              Short foot             Toe abduction              Great toe flexion into TB             Wobble board             BAPS             HR with MB adduction iso Red MB 2x10 Red MB 3x10 Red MB 3x10          SL isometric against (dorsiflexion) wall (lean) DL up , SL hold x10 5 seconds DL up , SL hold x10 5 seconds DL up , SL hold x10 5 seconds          Blaze pods SLS clap 4x30\" SLS            Ther Ex             Bicycle  8' L4 8' L4 8' L4 8' L4 8' L4  8' L4 8'L4 5' L4     Gastroc stretch with strap     Against wall 4x30\" SB 4x30\" SB 4x30\" SB 4x30\"     Soleus stretch with strap             4 way ankle TB             Anterior tib stretch   10x10\" L  L 4x30\" L 4x30\" L 4x30\"      Gastro stretch with towel       4x30\" L       Ankle DF tb       30x gtb                    Ther Activity             SLS with cone taps              POGO hops  30\" with UE support  Hold          Alternating POGO  30\" with UE support Hold          Eccentric heel lowering on leg press             SL HR 2x on L with UE support      2x10 on blue foam        Seated HR/TR             Step up 2x10 on BOSU with FT support 2x10 on step with george disc            Mini squats with HR 5 reps of HR per 1 set of squat 5x            Leg press              Toe walks/Heel walks             Eccentric lowering DL up and 95% WB lowering 3x10 Biodex to monitoring  DL up and 95% WB lowering 3x10 Biodex to monitoring  DL up and 100% WB lowering 3x10 Biodex to monitoring           SL HR while walking- hand rail  6x10 feet 6x10 feet           Quick step ups      30\" x3        Box jumps     6\" 2x10         Lateral hops     10x total 2 sets 2x10        Agility ladder      2 in, 1 in, laterals, lateral hops, broad jumps 2in, 1 in- terminated early d/t lateral ankle paiin      Shuffle sprints blaze pods " "      3x45 \"  3x30\"     Fast feet       3x30\"  3x30\"     Lateral walks with TB             Gait Training                                       Modalities                                       1:1 with PT from 245-330PM     "

## 2025-04-03 ENCOUNTER — OFFICE VISIT (OUTPATIENT)
Age: 45
End: 2025-04-03
Payer: COMMERCIAL

## 2025-04-03 VITALS
WEIGHT: 204.8 LBS | HEART RATE: 82 BPM | TEMPERATURE: 98.6 F | HEIGHT: 62 IN | SYSTOLIC BLOOD PRESSURE: 120 MMHG | RESPIRATION RATE: 16 BRPM | DIASTOLIC BLOOD PRESSURE: 74 MMHG | BODY MASS INDEX: 37.69 KG/M2

## 2025-04-03 DIAGNOSIS — E66.09 CLASS 2 OBESITY DUE TO EXCESS CALORIES WITHOUT SERIOUS COMORBIDITY WITH BODY MASS INDEX (BMI) OF 37.0 TO 37.9 IN ADULT: Primary | ICD-10-CM

## 2025-04-03 DIAGNOSIS — E66.812 CLASS 2 OBESITY DUE TO EXCESS CALORIES WITHOUT SERIOUS COMORBIDITY WITH BODY MASS INDEX (BMI) OF 37.0 TO 37.9 IN ADULT: Primary | ICD-10-CM

## 2025-04-03 DIAGNOSIS — E78.5 DYSLIPIDEMIA: ICD-10-CM

## 2025-04-03 PROCEDURE — 99204 OFFICE O/P NEW MOD 45 MIN: CPT | Performed by: PHYSICIAN ASSISTANT

## 2025-04-03 RX ORDER — TIRZEPATIDE 2.5 MG/.5ML
2.5 INJECTION, SOLUTION SUBCUTANEOUS WEEKLY
Qty: 2 ML | Refills: 0 | Status: SHIPPED | OUTPATIENT
Start: 2025-04-03 | End: 2025-05-01

## 2025-04-03 NOTE — PATIENT INSTRUCTIONS
I have sent Zebound to your pharmacy. The prior authorization process will been done through our prior authorization team and can take up to 3-4 weeks to process through the insurance.     - Start Zepbound 2.5 mg subcutaneously weekly. After you have taken the second pen, please give me an update, as we will likely increase the dose the next month if you are tolerating it well.    - Side effects of Zepbound include nausea, vomiting, diarrhea, or constipation. Keep an eye on your heart rate while on Zepbound. If you resting heart rate is greater than 100 beats per minutes, please notify me. If you develop severe abdominal pain, stop Zepbound and go to the emergency room, as that could be a sign of pancreatitis.     - Please notify me if you have surgery, upper endoscopy, or colonoscopy scheduled, as we typically hold Zepbound for one week prior to the procedure.     - Zepbound can reduce the effectiveness of oral hormonal birth control (birth control pills). Recommend a barrier backup method such as condoms to prevent pregnancy.       GLP-1 Managing Side Effects Tips:  - focus on small frequent meals  - moderate sugar and fat intake  - change the injection site (abdomen --> thigh--> back of arm)  - eat protein, crackers, mints and jasmine-based drinks  - nighttime injections  - drink plenty of water  - consider fiber supplements like miralax    Tips for Nausea:  - Don't drink and eat simultaneously: separate fluids 30-60 minutes before and after meals when experiencing nausea or if you notice you become full quickly  - Choose mild smelling foods- strong smells can exacerbate nausea  - Jasmine and peppermint- consider drinking jasmine or peppermint tea, which can help alleviate symptoms  - Eat- don't skip meals, if you have nausea, it is understandable that you may not feel like eating. However, skipping meals is generally not recommended as this can lead to lower blood sugar and fatigue. Furthermore, it is essential to  consume adequate protein during weight loss. You can opt for a protein shake, a meal replacement supplement, bone broth or soup.     Tips for Constipation:   - Drink plenty of water  - Eat food with a high fiber content: increase your fiber intake by consuming these types of foods:   Eat more whole grain products like barley, oats, farro, brown or wild rice and quinoa.    Look for choices with 100% whole wheat, rye, oats or bran as the first ingredient listed    Check nutrition fact labels and choose products with 4gm of dietary fiber or more per serving   Add more beans to your diet   Eat more fresh fruits and vegetables with skins on them    Exercise- increase physical activity as it helps stimulate gastric mobility, which moves stool through the digestive tract

## 2025-04-03 NOTE — LETTER
April 3, 2025     Everton Astorga MD  [address]  [address]  [facility] [address]    Patient: Sonja Parrish   YOB: 1980   Date of Visit: 4/3/2025       Dear Dr. Everton Astorga MD:    Thank you for referring Sonja Parrish to me for evaluation. Below are the relevant portions of my assessment and plan of care.         If you have questions, please do not hesitate to call me. I look forward to following Sonja along with you.         Sincerely,        Latia Marcelo PA-C        CC: No Recipients
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
## 2025-04-07 ENCOUNTER — TELEPHONE (OUTPATIENT)
Age: 45
End: 2025-04-07

## 2025-04-07 ENCOUNTER — OFFICE VISIT (OUTPATIENT)
Dept: PODIATRY | Facility: CLINIC | Age: 45
End: 2025-04-07
Payer: COMMERCIAL

## 2025-04-07 ENCOUNTER — OFFICE VISIT (OUTPATIENT)
Dept: PHYSICAL THERAPY | Age: 45
End: 2025-04-07
Payer: COMMERCIAL

## 2025-04-07 VITALS — WEIGHT: 206 LBS | OXYGEN SATURATION: 100 % | BODY MASS INDEX: 37.91 KG/M2 | HEIGHT: 62 IN | HEART RATE: 68 BPM

## 2025-04-07 DIAGNOSIS — Z98.890 STATUS POST FOOT SURGERY: ICD-10-CM

## 2025-04-07 DIAGNOSIS — M65.972 TENOSYNOVITIS OF LEFT FOOT: ICD-10-CM

## 2025-04-07 DIAGNOSIS — M79.672 LEFT FOOT PAIN: Primary | ICD-10-CM

## 2025-04-07 DIAGNOSIS — M76.822 POSTERIOR TIBIAL TENDINITIS OF LEFT LOWER EXTREMITY: Primary | ICD-10-CM

## 2025-04-07 PROCEDURE — 99213 OFFICE O/P EST LOW 20 MIN: CPT | Performed by: PODIATRIST

## 2025-04-07 PROCEDURE — 97110 THERAPEUTIC EXERCISES: CPT | Performed by: PHYSICAL THERAPIST

## 2025-04-07 PROCEDURE — L3030 FOOT ARCH SUPPORT REMOV PREM: HCPCS | Performed by: PHYSICAL THERAPIST

## 2025-04-07 NOTE — TELEPHONE ENCOUNTER
PA for Zepbound 2.5mg DENIED    Reason: Plan Exclusion            Message sent to office clinical pool Yes (to provider)    Denial letter scanned into Media Yes    Appeal started No (Provider will need to decide if appeal is warranted and send clinical documentation to Prior Authorization Team for initiation.)    **Please follow up with your patient regarding denial and next steps**
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
## 2025-04-07 NOTE — TELEPHONE ENCOUNTER
PA for Zepbound 2.5mg was SUBMITTED to [facility]    via    [x]CMM-KEY: [id number]  []Surescripts-Case ID #   []Availity-Auth ID # NDC #   []Faxed to plan   []Other website   []Phone call Case ID #     []PA sent as URGENT    All office notes, labs and other pertaining documents and studies sent. Clinical questions answered. Awaiting determination from insurance company.     Turnaround time for your insurance to make a decision on your Prior Authorization can take 7-21 business days.
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
## 2025-04-08 ENCOUNTER — OFFICE VISIT (OUTPATIENT)
Dept: PHYSICAL THERAPY | Facility: CLINIC | Age: 45
End: 2025-04-08
Payer: COMMERCIAL

## 2025-04-08 DIAGNOSIS — M54.12 CERVICAL RADICULOPATHY: Primary | ICD-10-CM

## 2025-04-08 DIAGNOSIS — G56.01 CARPAL TUNNEL SYNDROME OF RIGHT WRIST: ICD-10-CM

## 2025-04-08 DIAGNOSIS — R20.0 NUMBNESS AND TINGLING IN RIGHT HAND: ICD-10-CM

## 2025-04-08 DIAGNOSIS — R20.2 NUMBNESS AND TINGLING IN RIGHT HAND: ICD-10-CM

## 2025-04-08 PROCEDURE — 97112 NEUROMUSCULAR REEDUCATION: CPT | Performed by: PHYSICAL THERAPIST

## 2025-04-08 PROCEDURE — 97140 MANUAL THERAPY 1/> REGIONS: CPT | Performed by: PHYSICAL THERAPIST

## 2025-04-08 PROCEDURE — 97110 THERAPEUTIC EXERCISES: CPT | Performed by: PHYSICAL THERAPIST

## 2025-04-08 NOTE — PROGRESS NOTES
"Daily Note     Today's date: 2025  Patient name: Sonja Parrish  : 1980  MRN: [id number]  Referring provider: Varinder Carbajal DPM  Dx:   Encounter Diagnosis     ICD-10-CM    1. Cervical radiculopathy  M54.12       2. Carpal tunnel syndrome of right wrist  G56.01       3. Numbness and tingling in right hand  R20.0     R20.2                      Subjective: Pt followed up with Dr. Carbajal who recommended holding on LE PT at this point as he is concerned of developing achilles tendinitis and posterior tibial tendinitis with LE loading program. However, patient was given the clearance to start running as per patient. Pt will be leaving for vacation next week.       Objective: See treatment diary below      Assessment: Pt only had 1 session of increased discomfort after PT but was performing exercises (plyometrics) well the last couple of sessions. We were attempting to get patient to return to running following our loading program. We will hold as recommended by podiatrist, however, patient did not experience a significant increase in symptoms in LE after the past 2 PT sessions with LE loading program. Continued with treatment focusing on UE and neck. Implemented MT for her cervical spine mobility deficits. Pt notes less numbness/tingling in the hand but still having thumb trigger finger symptoms. Tolerated treatment well. Patient would benefit from continued PT focusing in CTS and c/s radiculopathy.        Plan: Continue per plan of care. Hold on LE loading program as instructed. Will resume when able.      Precautions: Anxiety, s/p foot  surgery on 24      Manuals 3/26 4/1 4/8          IASTM using LLL to carpal tunnel B 1.5 minutes each 1.5 minutes  1.5 minutes B          STM to CT             Cervical lateral glides   Gr II          SOR with light traction    JK          Neuro Re-Ed             Seated median N glides 10x on ea - neutral head 20x 3\" arm propped on table  Hooklyling 20x on ea 3\"        " "  Cervical retraction                                                                               Ther Ex             UBE   3'/3' L1          Wrist extension stretch              UT stretch  10x5\" B           LS stretch              Cervical snag-rotation   10x5\" B 10x5\"          Sup/pro with flexbar  2x10 rtb            Wrist extension stretch    20x3\"                        Ther Activity                                       Gait Training                                       Modalities                                       1:1 with PT from 423-503pm           "

## 2025-04-24 ENCOUNTER — APPOINTMENT (OUTPATIENT)
Dept: PHYSICAL THERAPY | Facility: CLINIC | Age: 45
End: 2025-04-24
Payer: COMMERCIAL

## 2025-04-24 NOTE — PROGRESS NOTES
[id number]    Today's date: 2025  Patient name: Sonja Parrish  : 1980  MRN: [id number]  Referring provider: Varinder Crabajal DPM  Dx: No diagnosis found.               Assessment  Impairments: abnormal muscle firing, abnormal muscle tone, abnormal or restricted ROM, activity intolerance, impaired physical strength, lacks appropriate home exercise program and pain with function    Assessment details: Sonja Parrish is a 44 y.o. female who presents with signs and symptoms consistent of persistent tingling in the right hand (5th, 4th, 3rd, and 2nd digit of palmar right hand) and to a lesser extent left hand. Pt has attempted injections, OT hand therapy, and night splinting without significant improvements. This condition has been ongoing since . Pt has attempted 3 PT sessions over the course of the month with mild progress. She did just return from traveling and wants to resume PT for her neck/hand symptoms. Pt has demonstrated improved neck ROM,  strength, and flexibility in wrists. Patient continues to present with with decreased neck ROM, positive spurlings test, increase paresthesia with traction, positive ULTT-a, wrist ROM deficits, and positive Tinels. Due to these impairments, Patient has difficulty performing fine motor activities, grasping, and gripping without reproduction of numbness or tingling. Patient would benefit from skilled physical therapy to address the impairments, improve their level of function, and to improve their overall quality of life.      Primary movement impairments:   1) Positive C/s radic cluster: ULTT-a, Spurlings, traction (increases) -Improving  2) Positive tinels B  3) Cervical ROM and wrist ROM deficits-Improving    Understanding of Dx/Px/POC: good     Prognosis: good    Goals  STG: to be achieved in 4-6 weeks  1)Reduce parethesia by 50% in bilateral hands  2)Improve pain free  strength by symmetrical bilaterally  3)Improve flexibility to wrist muscles to  WNL   4)Improve cervical spine ROM by 5-10 degrees    LTG: to be achieved at discharge   1)Improve FOTO to greater than or equal to expected  2)Improve ADL's in related activities to maximal level of function   3)Improve  strength to pain free with maximal force.    4)Improve ULTT-A mobility by 5-10 degrees    Plan  Planned modality interventions: low level laser therapy    Planned therapy interventions: IASTM, joint mobilization, manual therapy, neuromuscular re-education, therapeutic activities, therapeutic exercise and home exercise program    Frequency: 1x per week for 4 weeks.  Plan details: Implement treatment along side of progressing foot/ankle rehabilitation. (I.e. treat both hand and foot next upcoming sessions).         Subjective Evaluation    History of Present Illness  Mechanism of injury: History of Current Injury: Pt is a self referral for persistent right hand numbness/tingling and trigger finger of the 5th digit. She was previously diagnosed with R CTS and R cubital tunnel syndrome.  Pt has attempted hand therapy in the past without significant relief. She is currently a patient s/p foot surgery and requests an evaluation of her numbness/tingling in right hand. Pt was previously seen by Dr. Qureshi in 2022 and has attempted night splints without significant relief. She notes that she received injections in the 5th digit, but this was ultimately not successful. Her goal is to avoid surgery at all cost which bring her into to see me today.   Pain location/Descriptors: Tingling in 5th, 4th, 3rd, and 2nd on palmar side of the right hand.   Pt is right hand dominant. She notes more weakness on the right side (I.e: opening jars, fine motor)  Aggravating factors: Gripping, squeezing, fine motor activities, drawing, slouching forward, driving  Easing factors: rest, shaking the hand, sitting up right/positional changes at the spine  24 HR pattern: Worse in the morning  Imaging: [facility] US ruled in CTS in  2022.   Special Questions: + for numbness & tingling   Patient goals:  Return to normal lifestyle without N&T or pain  Hobbies/Interest: Exercise,         Patient Goals  Patient goals for therapy: decreased pain, increased strength, increased motion and independence with ADLs/IADLs    Pain  Pain scale: mild tingling.  Pain scale at highest: Numbness severe.      Diagnostic Tests  Ultrasound findings: abnormalTreatments  Previous treatment: injection treatment, medication and immobilization        Objective     Active Range of Motion   Cervical/Thoracic Spine       Cervical  Subcranial protraction:  WFL   Subcranial retraction: Active cervical subcranial retraction: Unable to reach neutral.   Flexion: 45 degrees   Extension: 50 degrees      Left lateral flexion: 35 degrees      Right lateral flexion: 35 degrees      Left rotation: 62 degrees  Right rotation: 65 degrees       Left Wrist   Wrist flexion: 65 degrees   Wrist extension: 55 degrees     Right Wrist   Wrist flexion: 60 degrees   Wrist extension: 50 degrees     Additional Active Range of Motion Details  Mild hand numbness present with left rotation and extension    Strength/Myotome Testing   Cervical Spine     Left   Interossei strength (t1): 5    Right   Interossei strength (t1): 5    Left Shoulder     Planes of Motion   Flexion: 5   Abduction: 5   External rotation at 0°: 5     Right Shoulder     Planes of Motion   Flexion: 5   Abduction: 5   External rotation at 0°: 5     Left Elbow   Flexion: 5  Extension: 5    Right Elbow   Flexion: 5  Extension: 5    Left Wrist/Hand   Wrist extension: 4  Wrist flexion: 4  Thumb extension: 4     (2nd hand position)     Trial 1: 55    Right Wrist/Hand   Wrist extension: 4  Wrist flexion: 4  Thumb extension: 4     (2nd hand position)     Trial 1: 55    Tests   Cervical   Negative alar ligament test, Sharp-Xiomy test and VBI.     Left   Positive Spurling's Test A.   Negative Spurling's Test B.     Right   Positive  "Spurling's Test B.   Negative Spurling's Test A.     Left Shoulder   Positive ULTT1.     Right Shoulder   Positive ULTT1.     Left Elbow   Positive Tinel's sign (cubital tunnel).     Right Elbow   Positive Tinel's sign (cubital tunnel).     Left Wrist/Hand   Negative Tinel's sign (medial nerve) and Tinel's sign (radial tunnel).     Right Wrist/Hand   Positive Tinel's sign (medial nerve).     Additional Tests Details  Cervical lateral glides: Increased hand tingling - bilaterally (hypomobile)  Cervical rotational: Increased hand tingling- bilaterally     OA mobility: Poor  AA mobility: Poor   +Headaches     Traction: increases paresthesia in B hands  +Spurlings A/B  +ULTTA B   ULTT-A: positive:  L: 45 deg from elbow ext  R: 70 deg from elbow ext      Precautions: Anxiety, s/p foot  surgery on 9/13/24      Manuals 3/26 4/1 4/8          IASTM using LLL to carpal tunnel B 1.5 minutes each 1.5 minutes  1.5 minutes B          STM to CT             Cervical lateral glides   Gr II          SOR with light traction    JK          Neuro Re-Ed             Seated median N glides 10x on ea - neutral head 20x 3\" arm propped on table  Hooklyling 20x on ea 3\"          Cervical retraction                                                                               Ther Ex             UBE   3'/3' L1          Wrist extension stretch              UT stretch  10x5\" B           LS stretch              Cervical snag-rotation   10x5\" B 10x5\"          Sup/pro with flexbar  2x10 rtb            Wrist extension stretch    20x3\"                        Ther Activity                                       Gait Training                                       Modalities                                       1:1 with PT from 423-503pm             "

## 2025-04-29 ENCOUNTER — EVALUATION (OUTPATIENT)
Dept: PHYSICAL THERAPY | Facility: CLINIC | Age: 45
End: 2025-04-29
Attending: PODIATRIST
Payer: COMMERCIAL

## 2025-04-29 DIAGNOSIS — M54.12 CERVICAL RADICULOPATHY: Primary | ICD-10-CM

## 2025-04-29 DIAGNOSIS — R20.0 NUMBNESS AND TINGLING IN RIGHT HAND: ICD-10-CM

## 2025-04-29 DIAGNOSIS — G56.01 CARPAL TUNNEL SYNDROME OF RIGHT WRIST: ICD-10-CM

## 2025-04-29 DIAGNOSIS — R20.2 NUMBNESS AND TINGLING IN RIGHT HAND: ICD-10-CM

## 2025-04-29 PROCEDURE — 97140 MANUAL THERAPY 1/> REGIONS: CPT | Performed by: PHYSICAL THERAPIST

## 2025-04-29 PROCEDURE — 97112 NEUROMUSCULAR REEDUCATION: CPT | Performed by: PHYSICAL THERAPIST

## 2025-04-29 PROCEDURE — 97110 THERAPEUTIC EXERCISES: CPT | Performed by: PHYSICAL THERAPIST

## 2025-04-29 NOTE — PROGRESS NOTES
[id number]  Today's date: 2025  Patient name: Sonja Parrish  : 1980  MRN: [id number]  Referring provider: Tyler Astorga*  Dx:   Encounter Diagnosis     ICD-10-CM    1. Cervical radiculopathy  M54.12       2. Carpal tunnel syndrome of right wrist  G56.01       3. Numbness and tingling in right hand  R20.0     R20.2           Start Time: 1620  Stop Time: 1700  Total time in clinic (min): 40 minutes    Assessment  Impairments: abnormal muscle firing, abnormal muscle tone, abnormal or restricted ROM, activity intolerance, impaired physical strength, lacks appropriate home exercise program and pain with function    Assessment details: Sonja Parrish is a 44 y.o. female who presents with signs and symptoms consistent of persistent tingling in the right hand (5th, 4th, 3rd, and 2nd digit of palmar right hand) and to a lesser extent left hand. Pt has attempted injections, OT hand therapy, and night splinting without significant improvements. This condition has been ongoing since . Pt has attempted 3 PT sessions over the course of the month with good progress. She just returned from traveling and wants to resume PT for her neck/hand symptoms. Pt has demonstrated improved neck ROM,  strength, and flexibility in wrists. Patient continues to present with with decreased neck ROM, positive spurlings test, increase paresthesia with traction, positive ULTT-a, wrist ROM deficits, and positive Tinels. Due to these impairments, Patient has difficulty performing fine motor activities, grasping, and gripping without reproduction of numbness or tingling. Patient would benefit from skilled physical therapy to address the impairments, improve their level of function, and to improve their overall quality of life.      Primary movement impairments:   1) Positive C/s radic cluster: ULTT-a, Spurlings, traction (increases) -Improving  2) Positive tinels B- Improving  3) Cervical ROM and wrist ROM  deficits-Improving    Understanding of Dx/Px/POC: good     Prognosis: good    Goals  STG: to be achieved in 4-6 weeks  1)Reduce parethesia by 50% in bilateral hands- Partially met  2)Improve pain free  strength by symmetrical bilaterally- Partially met  3)Improve flexibility to wrist muscles to WNL - Partially met  4)Improve cervical spine ROM by 5-10 degrees- Partially met    LTG: to be achieved at discharge   1)Improve FOTO to greater than or equal to expected- NT  2)Improve ADL's in related activities to maximal level of function - Partially met  3)Improve  strength to pain free with maximal force.    4)Improve ULTT-A mobility by 5-10 degrees- Partially met    Plan  Planned modality interventions: low level laser therapy    Planned therapy interventions: IASTM, joint mobilization, manual therapy, neuromuscular re-education, therapeutic activities, therapeutic exercise and home exercise program    Frequency: 1x per week for 4-6 weeks.  Plan details: Implement treatment along side of progressing foot/ankle rehabilitation. (I.e. treat both hand and foot next upcoming sessions).         Subjective Evaluation    History of Present Illness  Mechanism of injury: History of Current Injury: Pt is a self referral for persistent right hand numbness/tingling and trigger finger of the 5th digit. She was previously diagnosed with R CTS and R cubital tunnel syndrome.  Pt has attempted hand therapy in the past without significant relief. She is currently a patient s/p foot surgery and requests an evaluation of her numbness/tingling in right hand. Pt was previously seen by Dr. Qureshi in 2022 and has attempted night splints without significant relief. She notes that she received injections in the 5th digit, but this was ultimately not successful. Her goal is to avoid surgery at all cost which bring her into to see me today.   Pain location/Descriptors: Tingling in 5th, 4th, 3rd, and 2nd on palmar side of the right hand.    Pt is right hand dominant. She notes more weakness on the right side (I.e: opening jars, fine motor)  Aggravating factors: Gripping, squeezing, fine motor activities, drawing, slouching forward, driving  Easing factors: rest, shaking the hand, sitting up right/positional changes at the spine  24 HR pattern: Worse in the morning  Imaging: [facility] US ruled in CTS in 2022.   Special Questions: + for numbness & tingling   Patient goals:  Return to normal lifestyle without N&T or pain  Hobbies/Interest: Exercise,         Patient Goals  Patient goals for therapy: decreased pain, increased strength, increased motion and independence with ADLs/IADLs    Pain  Pain scale: mild tingling.  Pain scale at highest: Numbness severe.      Diagnostic Tests  Ultrasound findings: abnormalTreatments  Previous treatment: injection treatment, medication and immobilization        Objective     Active Range of Motion   Cervical/Thoracic Spine       Cervical  Subcranial protraction:  WFL   Subcranial retraction: Active cervical subcranial retraction: Unable to reach neutral.   Flexion: 60 degrees   Extension: 50 degrees      Left lateral flexion: 40 degrees      Right lateral flexion: 40 degrees      Left rotation: 74 degrees  Right rotation: 75 degrees       Left Wrist   Wrist flexion: 80 degrees   Wrist extension: 65 degrees     Right Wrist   Wrist flexion: 80 degrees   Wrist extension: 65 degrees     Additional Active Range of Motion Details  Mild hand numbness present with left rotation and extension (this continues)     Strength/Myotome Testing   Cervical Spine     Left   Interossei strength (t1): 5    Right   Interossei strength (t1): 5    Left Shoulder     Planes of Motion   Flexion: 5   Abduction: 5   External rotation at 0°: 5     Right Shoulder     Planes of Motion   Flexion: 5   Abduction: 5   External rotation at 0°: 5     Left Elbow   Flexion: 5  Extension: 5    Right Elbow   Flexion: 5  Extension: 5    Left Wrist/Hand   Wrist  "extension: 4  Wrist flexion: 4  Thumb extension: 4     (2nd hand position)     Trial 1: 60    Right Wrist/Hand   Wrist extension: 4  Wrist flexion: 4  Thumb extension: 4     (2nd hand position)     Trial 1: 60    Tests   Cervical   Negative alar ligament test, Sharp-Xiomy test and VBI.     Left   Positive Spurling's Test A.   Negative Spurling's Test B.     Right   Positive Spurling's Test B.   Negative Spurling's Test A.     Left Shoulder   Positive ULTT1.     Right Shoulder   Positive ULTT1.     Left Elbow   Positive Tinel's sign (cubital tunnel).     Right Elbow   Positive Tinel's sign (cubital tunnel).     Left Wrist/Hand   Negative Tinel's sign (medial nerve) and Tinel's sign (radial tunnel).     Right Wrist/Hand   Positive Tinel's sign (medial nerve).     Additional Tests Details  Cervical lateral glides: Increased hand tingling - bilaterally (hypomobile)  Cervical rotational: Increased hand tingling- bilaterally     OA mobility: Poor- Improving  AA mobility: Poor - Improving  +Headaches     Traction: increases paresthesia in B hands  +Spurlings A/B  +ULTTA B   ULTT-A: positive:  L: 30 deg from elbow ext  R: 70 deg from elbow ext      Precautions: Anxiety, s/p foot  surgery on 9/13/24      Manuals 3/26 4/1 4/8 4/29         IASTM using LLL to carpal tunnel B 1.5 minutes each 1.5 minutes  1.5 minutes B 1.5 minutes B          STM to CT             Cervical lateral glides   Gr II Gr II         SOR with light traction    JK JK         Neuro Re-Ed             Seated median N glides 10x on ea - neutral head 20x 3\" arm propped on table  Hooklyling 20x on ea 3\"          Cervical retraction                                                                               Ther Ex             UBE   3'/3' L1 6' L1         Wrist extension stretch              UT stretch  10x5\" B           LS stretch              Cervical snag-rotation   10x5\" B 10x5\"          Sup/pro with flexbar  2x10 rtb            Wrist extension " "stretch    20x3\"                        Ther Activity                                       Gait Training                                       Modalities                                       1:1 with PT from 420-5pm  Pt was re-evaluated x 30 minutes                       "

## 2025-05-06 ENCOUNTER — OFFICE VISIT (OUTPATIENT)
Dept: PHYSICAL THERAPY | Facility: CLINIC | Age: 45
End: 2025-05-06
Attending: PODIATRIST
Payer: COMMERCIAL

## 2025-05-06 DIAGNOSIS — M79.672 LEFT FOOT PAIN: ICD-10-CM

## 2025-05-06 DIAGNOSIS — R20.2 NUMBNESS AND TINGLING IN RIGHT HAND: ICD-10-CM

## 2025-05-06 DIAGNOSIS — M54.12 CERVICAL RADICULOPATHY: Primary | ICD-10-CM

## 2025-05-06 DIAGNOSIS — G56.01 CARPAL TUNNEL SYNDROME OF RIGHT WRIST: ICD-10-CM

## 2025-05-06 DIAGNOSIS — R20.0 NUMBNESS AND TINGLING IN RIGHT HAND: ICD-10-CM

## 2025-05-06 DIAGNOSIS — Z98.890 STATUS POST FOOT SURGERY: ICD-10-CM

## 2025-05-06 PROCEDURE — 97110 THERAPEUTIC EXERCISES: CPT | Performed by: PHYSICAL THERAPIST

## 2025-05-06 PROCEDURE — 97112 NEUROMUSCULAR REEDUCATION: CPT | Performed by: PHYSICAL THERAPIST

## 2025-05-06 PROCEDURE — 97140 MANUAL THERAPY 1/> REGIONS: CPT | Performed by: PHYSICAL THERAPIST

## 2025-05-06 NOTE — PROGRESS NOTES
"Daily Note     Today's date: 2025  Patient name: Sonja Parrish  : 1980  MRN: [id number]  Referring provider: Varinder Carbajal DPM  Dx:   Encounter Diagnosis     ICD-10-CM    1. Cervical radiculopathy  M54.12       2. Carpal tunnel syndrome of right wrist  G56.01       3. Numbness and tingling in right hand  R20.0     R20.2       4. Status post foot surgery  Z98.890       5. Left foot pain  M79.672           Start Time: 1630  Stop Time: 1715  Total time in clinic (min): 45 minutes    Subjective: Pt offers no new complaints today.       Objective: See treatment diary below      Assessment: Tolerated treatment well. Continued to work on primary movement impairments at the cervical spine and hands.  Patient exhibited good technique with therapeutic exercises with minor cueing. Pt will benefit from continuation of skilled PT.      Plan: Continue per plan of care.        Manuals 3/26 4/1 4/8 4/29 5/6        IASTM using LLL to carpal tunnel B 1.5 minutes each 1.5 minutes  1.5 minutes B 1.5 minutes B  1.5 minutes B         STM to CT             Cervical lateral glides   Gr II Gr II Gr II        SOR with light traction    JK JK JK        Neuro Re-Ed             Seated median N glides 10x on ea - neutral head 20x 3\" arm propped on table  Hooklyling 20x on ea 3\"  20x3\" propped on table        Cervical retraction      15x3\"                                                                          Ther Ex             UBE   3'/3' L1 6' L1 8' L1        Wrist extension stretch              UT stretch  10x5\" B   10x5\"         LS stretch              Cervical snag-rotation   10x5\" B 10x5\"  10x5\" B         Sup/pro with flexbar  2x10 rtb    2x10 red flexbar        Wrist extension stretch    20x3\"   10x10\"                     Ther Activity                                       Gait Training                                       Modalities                                       1:1 with PT from 430-515pm       "

## 2025-05-13 ENCOUNTER — APPOINTMENT (OUTPATIENT)
Dept: PHYSICAL THERAPY | Facility: CLINIC | Age: 45
End: 2025-05-13
Attending: PODIATRIST
Payer: COMMERCIAL

## 2025-05-13 NOTE — PROGRESS NOTES
"Daily Note     Today's date: 2025  Patient name: Sonja Parrish  : 1980  MRN: [id number]  Referring provider: Varinder Carbajal DPM  Dx: No diagnosis found.               Subjective: ***      Objective: See treatment diary below      Assessment: Tolerated treatment {Tolerated treatment :[id number]}. Patient {assessment:[id number]}      Plan: Continue per plan of care.        Manuals 3/26 4/1 4/8 4/29 5/6 5/13       IASTM using LLL to carpal tunnel B 1.5 minutes each 1.5 minutes  1.5 minutes B 1.5 minutes B  1.5 minutes B         STM to CT             Cervical lateral glides   Gr II Gr II Gr II        SOR with light traction    JK JK JK        Neuro Re-Ed             Seated median N glides 10x on ea - neutral head 20x 3\" arm propped on table  Hooklyling 20x on ea 3\"  20x3\" propped on table        Cervical retraction      15x3\"                                                                          Ther Ex             UBE   3'/3' L1 6' L1 8' L1 3'/3' L1       Wrist extension stretch              UT stretch  10x5\" B   10x5\"         LS stretch              Cervical snag-rotation   10x5\" B 10x5\"  10x5\" B         Sup/pro with flexbar  2x10 rtb    2x10 red flexbar        Wrist extension stretch    20x3\"   10x10\"                     Ther Activity                                       Gait Training                                       Modalities                                       1:1 with PT from 430-515pm         "

## 2025-05-15 ENCOUNTER — OFFICE VISIT (OUTPATIENT)
Dept: PHYSICAL THERAPY | Facility: CLINIC | Age: 45
End: 2025-05-15
Attending: PODIATRIST
Payer: COMMERCIAL

## 2025-05-15 DIAGNOSIS — R20.0 NUMBNESS AND TINGLING IN RIGHT HAND: ICD-10-CM

## 2025-05-15 DIAGNOSIS — M54.12 CERVICAL RADICULOPATHY: Primary | ICD-10-CM

## 2025-05-15 DIAGNOSIS — Z98.890 STATUS POST FOOT SURGERY: ICD-10-CM

## 2025-05-15 DIAGNOSIS — R20.2 NUMBNESS AND TINGLING IN RIGHT HAND: ICD-10-CM

## 2025-05-15 DIAGNOSIS — M79.672 LEFT FOOT PAIN: ICD-10-CM

## 2025-05-15 DIAGNOSIS — G56.01 CARPAL TUNNEL SYNDROME OF RIGHT WRIST: ICD-10-CM

## 2025-05-15 PROCEDURE — 97110 THERAPEUTIC EXERCISES: CPT | Performed by: PHYSICAL THERAPIST

## 2025-05-15 PROCEDURE — 97112 NEUROMUSCULAR REEDUCATION: CPT | Performed by: PHYSICAL THERAPIST

## 2025-05-15 PROCEDURE — 97140 MANUAL THERAPY 1/> REGIONS: CPT | Performed by: PHYSICAL THERAPIST

## 2025-05-15 NOTE — PROGRESS NOTES
"Daily Note     Today's date: 5/15/2025  Patient name: Sonja Parrish  : 1980  MRN: [id number]  Referring provider: Varinder Carbajal DPM  Dx:   Encounter Diagnosis     ICD-10-CM    1. Cervical radiculopathy  M54.12       2. Left foot pain  M79.672       3. Carpal tunnel syndrome of right wrist  G56.01       4. Numbness and tingling in right hand  R20.0     R20.2       5. Status post foot surgery  Z98.890                      Subjective: Pt overall doing well. She remains compliant with her HEP.       Objective: See treatment diary below      Assessment: Tolerated treatment well. Cervical ROM improving in all directions. Minor cueing provided throughout program for correct performance. Patient exhibited good technique with therapeutic exercises and would benefit from continued PT.       Plan: Continue per plan of care.        Manuals 3/26 4/1 4/8 4/29 5/6 5/13       IASTM using LLL to carpal tunnel B 1.5 minutes each 1.5 minutes  1.5 minutes B 1.5 minutes B  1.5 minutes B  1.5 minutes B        STM to CT             Cervical lateral glides   Gr II Gr II Gr II Gr II       SOR with light traction    JK JK JK JK       Neuro Re-Ed             Seated median N glides 10x on ea - neutral head 20x 3\" arm propped on table  Hooklyling 20x on ea 3\"  20x3\" propped on table 20x3\" propped on table       Cervical retraction      15x3\"  15x3\"                                                                        Ther Ex             UBE   3'/3' L1 6' L1 8' L1 3'/3' L1       Wrist extension stretch              UT stretch  10x5\" B   10x5\"  10x10\" B       LS stretch              Cervical snag-rotation   10x5\" B 10x5\"  10x5\" B  10x5\" B       Sup/pro with flexbar  2x10 rtb    2x10 red flexbar 2x10 red flexar       Wrist extension stretch    20x3\"   10x10\" 10x10\"                     Ther Activity                                       Gait Training                                       Modalities                                     "   1:1 with PT from 546-630pm

## 2025-05-20 ENCOUNTER — OFFICE VISIT (OUTPATIENT)
Dept: PHYSICAL THERAPY | Facility: CLINIC | Age: 45
End: 2025-05-20
Attending: PODIATRIST
Payer: COMMERCIAL

## 2025-05-20 DIAGNOSIS — M54.12 CERVICAL RADICULOPATHY: Primary | ICD-10-CM

## 2025-05-20 PROCEDURE — 97112 NEUROMUSCULAR REEDUCATION: CPT | Performed by: PHYSICAL THERAPIST

## 2025-05-20 PROCEDURE — 97140 MANUAL THERAPY 1/> REGIONS: CPT | Performed by: PHYSICAL THERAPIST

## 2025-05-20 PROCEDURE — 97110 THERAPEUTIC EXERCISES: CPT | Performed by: PHYSICAL THERAPIST

## 2025-05-20 NOTE — PROGRESS NOTES
"Daily Note     Today's date: 2025  Patient name: Sonja Parrish  : 1980  MRN: [id number]  Referring provider: Varinder Carbajal DPM  Dx:   Encounter Diagnosis     ICD-10-CM    1. Cervical radiculopathy  M54.12                      Subjective: Pt reports feeling better at night time with numbness/tingling and discomfort. Admits to having increased symptoms today after cleaning her boat off. She notes the initiate of a trigger finger of her right middle phalanx.        Objective: See treatment diary below      Assessment: Tolerated treatment well. Cueing provided for correct performance of cervical exercises. Pt continues to experience mild tingling on the right with median nerve glides. Patient would benefit from continued PT.      Plan: Continue per plan of care. Due to insurance limitations, patient will continue PT via self pay.        Manuals 3/26 4/1 4/8 4/29 5/6 5/13 5/20      IASTM using LLL to carpal tunnel B 1.5 minutes each 1.5 minutes  1.5 minutes B 1.5 minutes B  1.5 minutes B  1.5 minutes B  1.5 minutes B      STM to CT             Cervical lateral glides   Gr II Gr II Gr II Gr II Gr II      SOR with light traction    JK JK JK JK JK      Neuro Re-Ed             Seated median N glides 10x on ea - neutral head 20x 3\" arm propped on table  Hooklyling 20x on ea 3\"  20x3\" propped on table 20x3\" propped on table 20x3\" propped on table      Cervical retraction      15x3\"  15x3\" 15x3\"                                                                       Ther Ex             UBE   3'/3' L1 6' L1 8' L1 3'/3' L1 3'/3' L1      Wrist extension stretch              UT stretch  10x5\" B   10x5\"  10x10\" B 10x10\" B      LS stretch              Cervical snag-rotation   10x5\" B 10x5\"  10x5\" B  10x5\" B 10x5\" B      Sup/pro with flexbar  2x10 rtb    2x10 red flexbar 2x10 red flexar 3x10 red      Wrist extension stretch    20x3\"   10x10\" 10x10\"  10x10\" B                   Ther Activity                                  "      Gait Training                                       Modalities                                       1:1 with PT from 759 721pm

## 2025-05-27 ENCOUNTER — OFFICE VISIT (OUTPATIENT)
Dept: PHYSICAL THERAPY | Facility: CLINIC | Age: 45
End: 2025-05-27
Attending: PODIATRIST

## 2025-05-27 DIAGNOSIS — R20.0 NUMBNESS AND TINGLING IN RIGHT HAND: ICD-10-CM

## 2025-05-27 DIAGNOSIS — G56.01 CARPAL TUNNEL SYNDROME OF RIGHT WRIST: ICD-10-CM

## 2025-05-27 DIAGNOSIS — R20.2 NUMBNESS AND TINGLING IN RIGHT HAND: ICD-10-CM

## 2025-05-27 DIAGNOSIS — M54.12 CERVICAL RADICULOPATHY: Primary | ICD-10-CM

## 2025-05-27 PROCEDURE — 97140 MANUAL THERAPY 1/> REGIONS: CPT | Performed by: PHYSICAL THERAPIST

## 2025-05-27 NOTE — PROGRESS NOTES
"Daily Note     Today's date: 2025  Patient name: Sonja Parrish  : 1980  MRN: [id number]  Referring provider: Varinder Carbajal DPM  Dx:   Encounter Diagnosis     ICD-10-CM    1. Cervical radiculopathy  M54.12       2. Carpal tunnel syndrome of right wrist  G56.01       3. Numbness and tingling in right hand  R20.0     R20.2                      Subjective: Pt reports no new complaints this evening.       Objective: See treatment diary below      Assessment: Pt arrived 15 minutes late but was accommodated. Pt experiences more discomfort and reported stiffness with left cervical glides vs right. She also presents with increased tone in left upper trap that responds well to pressure and STM.  Tolerated treatment well. Patient would benefit from continued PT      Plan: Continue per plan of care.        Manuals 3/26 4/1 4/8 4/29 5/6 5/13 5/20 5/27     IASTM using LLL to carpal tunnel B 1.5 minutes each 1.5 minutes  1.5 minutes B 1.5 minutes B  1.5 minutes B  1.5 minutes B  1.5 minutes B 1.5 minutes B     STM to CT             Cervical lateral glides   Gr II Gr II Gr II Gr II Gr II Gr II c/ Left sided UT STM      SOR with light traction    JK ANA M VIRAMONTES     Neuro Re-Ed             Seated median N glides 10x on ea - neutral head 20x 3\" arm propped on table  Hooklyling 20x on ea 3\"  20x3\" propped on table 20x3\" propped on table 20x3\" propped on table 20x3\" propped on table     Cervical retraction      15x3\"  15x3\" 15x3\" 15x3\"                                                                      Ther Ex             UBE   3'/3' L1 6' L1 8' L1 3'/3' L1 3'/3' L1 3'/3' L1     Wrist extension stretch              UT stretch  10x5\" B   10x5\"  10x10\" B 10x10\" B 10x10\" B     LS stretch              Cervical snag-rotation   10x5\" B 10x5\"  10x5\" B  10x5\" B 10x5\" B 10x5\" B     Sup/pro with flexbar  2x10 rtb    2x10 red flexbar 2x10 red flexar 3x10 red 3x10 red      Wrist extension stretch    20x3\"   10x10\" 10x10\"  10x10\" " "B 10x10\"                   Ther Activity                                       Gait Training                                       Modalities                                       1:1 with PT from 450-655IM             "

## 2025-06-03 ENCOUNTER — OFFICE VISIT (OUTPATIENT)
Dept: PHYSICAL THERAPY | Facility: CLINIC | Age: 45
End: 2025-06-03
Attending: PODIATRIST

## 2025-06-03 DIAGNOSIS — R20.0 NUMBNESS AND TINGLING IN RIGHT HAND: ICD-10-CM

## 2025-06-03 DIAGNOSIS — G56.01 CARPAL TUNNEL SYNDROME OF RIGHT WRIST: ICD-10-CM

## 2025-06-03 DIAGNOSIS — M54.12 CERVICAL RADICULOPATHY: Primary | ICD-10-CM

## 2025-06-03 DIAGNOSIS — R20.2 NUMBNESS AND TINGLING IN RIGHT HAND: ICD-10-CM

## 2025-06-03 PROCEDURE — 97140 MANUAL THERAPY 1/> REGIONS: CPT

## 2025-06-03 NOTE — PROGRESS NOTES
"Daily Note     Today's date: 6/3/2025  Patient name: Sonja Parrish  : 1980  MRN: [id number]  Referring provider: Varinder Carbajal DPM  Dx:   Encounter Diagnosis     ICD-10-CM    1. Cervical radiculopathy  M54.12       2. Carpal tunnel syndrome of right wrist  G56.01       3. Numbness and tingling in right hand  R20.0     R20.2           Start Time: 1630  Stop Time: 1710  Total time in clinic (min): 40 minutes    Subjective: Pt reports increased numbness in her R hand following a 5K mud run this past weekend.  Still has increased numbness in R hand, digits 1-3, but is slowly getting better, returning to baseline.        Objective: See treatment diary below      Assessment: Tolerated treatment well. Continued with program as outlined below.  Responded well to manual treatment with decreased numbness in digits 1-3.  Trialled STM to R UT also, which worsened numbness; returned back to improved baseline with SOR/cervical traction.  Patient would benefit from continued PT.      Plan: Continue per plan of care.        Manuals 3/26 4/1 4/8 4/29 5/6 5/13 5/20 5/27 6/3    IASTM using LLL to carpal tunnel B 1.5 minutes each 1.5 minutes  1.5 minutes B 1.5 minutes B  1.5 minutes B  1.5 minutes B  1.5 minutes B 1.5 minutes B 1.5 minutes B    STM to CT             Cervical lateral glides   Gr II Gr II Gr II Gr II Gr II Gr II c/ Left sided UT STM  L UT STM only    SOR with light traction    ANA M VIRAMONTES AFB    Neuro Re-Ed             Seated median N glides 10x on ea - neutral head 20x 3\" arm propped on table  Hooklyling 20x on ea 3\"  20x3\" propped on table 20x3\" propped on table 20x3\" propped on table 20x3\" propped on table 20x3\" propped on table    Cervical retraction      15x3\"  15x3\" 15x3\" 15x3\" 15x3\"                                                                     Ther Ex             UBE   3'/3' L1 6' L1 8' L1 3'/3' L1 3'/3' L1 3'/3' L1 3'/3' L1    Wrist extension stretch              UT stretch  10x5\" B   10x5\"  " "10x10\" B 10x10\" B 10x10\" B 10x10\" B    LS stretch              Cervical snag-rotation   10x5\" B 10x5\"  10x5\" B  10x5\" B 10x5\" B 10x5\" B 10x5\" B    Sup/pro with flexbar  2x10 rtb    2x10 red flexbar 2x10 red flexar 3x10 red 3x10 red  3x10 red     Wrist extension stretch    20x3\"   10x10\" 10x10\"  10x10\" B 10x10\"  10x10\"                  Ther Activity                                       Gait Training                                       Modalities                                                      "

## 2025-06-09 ENCOUNTER — OFFICE VISIT (OUTPATIENT)
Dept: PHYSICAL THERAPY | Facility: CLINIC | Age: 45
End: 2025-06-09
Attending: PODIATRIST

## 2025-06-09 DIAGNOSIS — M54.12 CERVICAL RADICULOPATHY: Primary | ICD-10-CM

## 2025-06-09 DIAGNOSIS — G56.01 CARPAL TUNNEL SYNDROME OF RIGHT WRIST: ICD-10-CM

## 2025-06-09 DIAGNOSIS — R20.2 NUMBNESS AND TINGLING IN RIGHT HAND: ICD-10-CM

## 2025-06-09 DIAGNOSIS — R20.0 NUMBNESS AND TINGLING IN RIGHT HAND: ICD-10-CM

## 2025-06-09 PROCEDURE — 97140 MANUAL THERAPY 1/> REGIONS: CPT | Performed by: PHYSICAL THERAPIST

## 2025-06-09 NOTE — PROGRESS NOTES
"Daily Note     Today's date: 2025  Patient name: Sonja Parrish  : 1980  MRN: [id number]  Referring provider: Varinder Carbajal DPM  Dx:   Encounter Diagnosis     ICD-10-CM    1. Cervical radiculopathy  M54.12       2. Numbness and tingling in right hand  R20.0     R20.2       3. Carpal tunnel syndrome of right wrist  G56.01                      Subjective: Pt reports having increase numbness after performing a \"mud run\" with obstacle. She did not run but mainly walked the course. She does note using hands to .       Objective: See treatment diary below      Assessment: Tolerated treatment well. Increased parethesia's present during and after completing higher level activity. Less discomfort present in left side of neck and upper trapezius region. Patient exhibited good technique with therapeutic exercises and would benefit from continued PT.       Plan: Continue per plan of care.        Manuals 3/26 4/1 4/8 4/29 5/6 5/13 5/20 5/27 6/3 6/9    IASTM using LLL to carpal tunnel B 1.5 minutes each 1.5 minutes  1.5 minutes B 1.5 minutes B  1.5 minutes B  1.5 minutes B  1.5 minutes B 1.5 minutes B 1.5 minutes B 1.5 min B   STM to CT             Cervical lateral glides   Gr II Gr II Gr II Gr II Gr II Gr II c/ Left sided UT STM  L UT STM only Gr II c/ Left sided UT STM    SOR with light traction    JK JK JK JK JK JK AFB JK   Neuro Re-Ed             Seated median N glides 10x on ea - neutral head 20x 3\" arm propped on table  Hooklyling 20x on ea 3\"  20x3\" propped on table 20x3\" propped on table 20x3\" propped on table 20x3\" propped on table 20x3\" propped on table 20x3\" propped on table   Cervical retraction      15x3\"  15x3\" 15x3\" 15x3\" 15x3\" 15x3\"                                                                    Ther Ex             UBE   3'/3' L1 6' L1 8' L1 3'/3' L1 3'/3' L1 3'/3' L1 3'/3' L1 3'/3' L3   Wrist extension stretch              UT stretch  10x5\" B   10x5\"  10x10\" B 10x10\" B 10x10\" B 10x10\" B 10x10\" " "B   LS stretch              Cervical snag-rotation   10x5\" B 10x5\"  10x5\" B  10x5\" B 10x5\" B 10x5\" B 10x5\" B 10x5\" B   Sup/pro with flexbar  2x10 rtb    2x10 red flexbar 2x10 red flexar 3x10 red 3x10 red  3x10 red  3x10 red   Wrist extension stretch    20x3\"   10x10\" 10x10\"  10x10\" B 10x10\"  10x10\"  10x10\"                 Ther Activity                                       Gait Training                                       Modalities                                         1:1 with PT from 430-5PM               "

## 2025-06-17 ENCOUNTER — CLINICAL SUPPORT (OUTPATIENT)
Age: 45
End: 2025-06-17

## 2025-06-17 VITALS — BODY MASS INDEX: 37.84 KG/M2 | HEIGHT: 62 IN | WEIGHT: 205.6 LBS

## 2025-06-17 DIAGNOSIS — R63.5 ABNORMAL WEIGHT GAIN: Primary | ICD-10-CM

## 2025-06-17 PROCEDURE — RECHECK

## 2025-06-17 PROCEDURE — WMDI30

## 2025-06-17 NOTE — PROGRESS NOTES
Weight Management Medical Nutrition Assessment    Sonja presented for a meal planning session. Today's weight is 205.6.      Per dietary recall patient consumes excess calories from portion sizes larger than recommended for heigh, weight, age, and gender. She was denied GLP1 medication per insurance, and will be going the conservative route in efforts to help with weight loss. She is doing a good job; however she would like to dial in more with finding a better balance:     Goals:   Limit take out to 0-1 days per week   Increase protein intake throughout the day   Create two protein and fiber based snacks in late PM and before bed    Developed and reviewed a low calorie meal plan:      Nutrition Prescription  Calories:7450-1243  Protein:70g  Fluid:80 fl oz    Meal Plan (Selina/Pro/Carb)  Breakfast: 375-400/20g PRO  Snack:  Lunch: 375-400/28g PRO  Snack: 150 selina/10g PRO  Dinner: 400/42g PRO  Snack: 150 selina/20g PRO    Patient seen by Medical Provider in past 6 months:  yes  Requested to schedule appointment with Medical Provider: Yes      Anthropometric Measurements  Start Weight (#) & Date: 204 4/03/25  Current Weight (#): 205.6  TBW % Change from start weight:-  Ideal Body Weight (#):110  Goal Weight (#):140  Highest:  Lowest:    Weight Loss History  Previous weight loss attempts: Commercial Programs (Weight Watchers, Tatiana [employer], etc.); tracking points and fill in the gaps     Food and Nutrition Related History  Wake up: -   Bed Time:-    Food Recall  Breakfast:2 eggs with sourdough bread; cereal or oatmeal with vanilla protein powder and almond milk; OJ or water; fruit   Snack:-  Lunch:leftovers from the night before, might be take out - chips, chicken parm, spaghetti, meatloaf, chicken with veggies   Snack: chips, something crunchy and salty; chocolate peanut butter cup, usually around 3PM   Dinner:larger portion sizes at dinner will have a vegetable   Snack:popcorn; stovetop       Beverages: water, sugar free  beverages, juice, and regular soda  Volume of beverage intake: 64    Weekends: Same  Cravings: yes  Trouble area of day:afternoon or watching TV after dinner     Frequency of Eating out: every 2 days  Food restrictions:none  Cooking: self   Food Shopping: self    Physical Activity Intake  Activity:Walking, Strength Training, and Stretching  Frequency:several times per week  Physical limitations/barriers to exercise: Current in PT and progressing     Estimated Needs  Energy  Angelia Rolle Energy Needs: BMR : 1528   1-2# loss weekly sedentary:  9813-3101             1-2# loss weekly lightly active:9773-1414  Maintenance calories for sedentary activity level: 1834  Protein:60-75g      (1.2-1.5g/kg IBW)  Fluid Requirement Calculator   Total Fluid: 100   oz  (Gary-Segar Method)  Free Fluid: 80 oz (Gary-Segar Method - 20%)    Nutrition Diagnosis  Yes;    Excessive energy intake  related to Food and nutrition related knowledge deficit concerning energy intake as evidenced by  BMI >25 for adults       Nutrition Intervention    Nutrition Prescription  Calories:0121-3293  Protein:70g  Fluid:80 fl oz    Meal Plan (Priyank/Pro/Carb)  Breakfast: 375-400/20g PRO  Snack:  Lunch: 375-400/28g PRO  Snack: 150 priyank/10g PRO  Dinner: 400/42g PRO  Snack: 150 priyank/20g PRO    Nutrition Education:    Calorie controlled menu  Lean protein food choices  Healthy snack options  Food journaling tips      Nutrition Counseling:  Strategies: meal planning, portion sizes, healthy snack choices, hydration, fiber intake, protein intake, exercise, food journal      Monitoring and Evaluation:  Evaluation criteria:  Energy Intake  Meet protein needs  Maintain adequate hydration  Monitor weekly weight  Meal planning/preparation  Food journal   Decreased portions at mealtimes and snacks  Physical activity     Barriers to learning:none  Readiness to change: Preparation:  (Getting ready to change)   Comprehension: very good  Expected Compliance: very  good

## 2025-06-19 ENCOUNTER — OFFICE VISIT (OUTPATIENT)
Dept: PODIATRY | Facility: CLINIC | Age: 45
End: 2025-06-19
Payer: COMMERCIAL

## 2025-06-19 ENCOUNTER — OFFICE VISIT (OUTPATIENT)
Dept: PHYSICAL THERAPY | Facility: CLINIC | Age: 45
End: 2025-06-19
Attending: PODIATRIST

## 2025-06-19 VITALS — WEIGHT: 203 LBS | HEIGHT: 62 IN | BODY MASS INDEX: 37.36 KG/M2

## 2025-06-19 DIAGNOSIS — M54.12 CERVICAL RADICULOPATHY: Primary | ICD-10-CM

## 2025-06-19 DIAGNOSIS — M65.972 TENOSYNOVITIS OF LEFT FOOT: Primary | ICD-10-CM

## 2025-06-19 DIAGNOSIS — R20.2 NUMBNESS AND TINGLING IN RIGHT HAND: ICD-10-CM

## 2025-06-19 DIAGNOSIS — R20.0 NUMBNESS AND TINGLING IN RIGHT HAND: ICD-10-CM

## 2025-06-19 DIAGNOSIS — G56.01 CARPAL TUNNEL SYNDROME OF RIGHT WRIST: ICD-10-CM

## 2025-06-19 DIAGNOSIS — M76.822 POSTERIOR TIBIAL TENDINITIS OF LEFT LOWER EXTREMITY: ICD-10-CM

## 2025-06-19 PROCEDURE — 97140 MANUAL THERAPY 1/> REGIONS: CPT | Performed by: PHYSICAL THERAPIST

## 2025-06-19 PROCEDURE — 99213 OFFICE O/P EST LOW 20 MIN: CPT | Performed by: PODIATRIST

## 2025-06-19 NOTE — PROGRESS NOTES
"Daily Note     Today's date: 2025  Patient name: Sonja Parrish  : 1980  MRN: [id number]  Referring provider: Varinder Carbajal DPM  Dx:   Encounter Diagnosis     ICD-10-CM    1. Cervical radiculopathy  M54.12       2. Numbness and tingling in right hand  R20.0     R20.2       3. Carpal tunnel syndrome of right wrist  G56.01                      Subjective: Pt reports feel much improved overall. Less intense numbness/tingling although still present.       Objective: See treatment diary below      Assessment: Tolerated treatment well. Pt pleased with progress thus far. Patient exhibited good technique with therapeutic exercises and would benefit from continued PT.      Plan: Continue per plan of care. Progress note next session        Manuals 6/19   4/29 5/6 5/13 5/20 5/27 6/3 6/9    IASTM using LLL to carpal tunnel B 1.5 minutes B   1.5 minutes B  1.5 minutes B  1.5 minutes B  1.5 minutes B 1.5 minutes B 1.5 minutes B 1.5 min B   STM to CT             Cervical lateral glides Gr II c/ Left sided UT STM    Gr II Gr II Gr II Gr II Gr II c/ Left sided UT STM  L UT STM only Gr II c/ Left sided UT STM    SOR with light traction  JK    JK JK JK JK JK AFB JK   Neuro Re-Ed             Seated median N glides 20x3\" propped on table    20x3\" propped on table 20x3\" propped on table 20x3\" propped on table 20x3\" propped on table 20x3\" propped on table 20x3\" propped on table   Cervical retraction  15x3\"     15x3\"  15x3\" 15x3\" 15x3\" 15x3\" 15x3\"                                                                    Ther Ex             UBE 3'/3' L 3   6' L1 8' L1 3'/3' L1 3'/3' L1 3'/3' L1 3'/3' L1 3'/3' L3   Wrist extension stretch              UT stretch 5x10\" B    10x5\"  10x10\" B 10x10\" B 10x10\" B 10x10\" B 10x10\" B   LS stretch  5x10\" B            Cervical snag-rotation  10x5\" B    10x5\" B  10x5\" B 10x5\" B 10x5\" B 10x5\" B 10x5\" B   Sup/pro with flexbar 3x10 red    2x10 red flexbar 2x10 red flexar 3x10 red 3x10 red  3x10 red  " "3x10 red   Wrist extension stretch  10x10\"    10x10\" 10x10\"  10x10\" B 10x10\"  10x10\"  10x10\"                 Ther Activity                                       Gait Training                                       Modalities                                         1:1 with PT from 430-5PM                 "

## 2025-06-20 NOTE — PROGRESS NOTES
[id number]    Today's date: 2025  Patient name: Sonja Parrish  : 1980  MRN: [id number]  Referring provider: Tyler Astorga*  Dx:   Encounter Diagnosis     ICD-10-CM    1. Cervical radiculopathy  M54.12       2. Numbness and tingling in right hand  R20.0     R20.2       3. Carpal tunnel syndrome of right wrist  G56.01                      Assessment:  Sonja Parrish is a 44 y.o. female who presents with signs and symptoms consistent of persistent tingling in the right hand (5th, 4th, 3rd, and 2nd digit of palmar right hand) and to a lesser extent left hand. She also has persistent neck pain with headaches a couple times per week. Pt has attempted injections, OT hand therapy, and night splinting without significant improvements. This condition has been ongoing since . She reports improvement with PT. She notes frequency and intensity of paresthesias and pain have lessened. Pt has demonstrated improvements in ROM, median nerve excursion during ULTTA, joint mobility restrictions at cervical spine, and wrist ROM. Pt continues to experience parethesias and positive tinels signs. At this time, pt would benefit from continued PT to address remaining movement impairments and symptoms. Pt is in agreement with POC.     Impairments: abnormal muscle firing, abnormal muscle tone, abnormal or restricted ROM, activity intolerance, impaired physical strength, lacks appropriate home exercise program and pain with function      Primary movement impairments:   1) Positive C/s radic cluster: ULTT-a, Spurlings, traction (increases) -Improving  2) Positive tinels B- Improving  3) Cervical ROM and wrist ROM deficits-Improving    Understanding of Dx/Px/POC: good     Prognosis: good    Goals  STG: to be achieved in 4-6 weeks  1)Reduce parethesia by 50% in bilateral hands- Partially met  2)Improve pain free  strength by symmetrical bilaterally- Partially met  3)Improve flexibility to wrist muscles to WNL -  Partially met  4)Improve cervical spine ROM by 5-10 degrees- Partially met    LTG: to be achieved at discharge   1)Improve FOTO to greater than or equal to expected- NT  2)Improve ADL's in related activities to maximal level of function - Partially met  3)Improve  strength to pain free with maximal force.    4)Improve ULTT-A mobility by 5-10 degrees- Partially met    Plan  Planned modality interventions: low level laser therapy    Planned therapy interventions: IASTM, joint mobilization, manual therapy, neuromuscular re-education, therapeutic activities, therapeutic exercise and home exercise program    Frequency: 1x per week for 6-8 weeks.          Subjective Evaluation    History of Present Illness  Mechanism of injury: History of Current Injury: Pt is a self referral for persistent right hand numbness/tingling and trigger finger of the 5th digit. She was previously diagnosed with R CTS and R cubital tunnel syndrome.  Pt has attempted hand therapy in the past without significant relief. She is currently a patient s/p foot surgery and requests an evaluation of her numbness/tingling in right hand. Pt was previously seen by Dr. Qureshi in 2022 and has attempted night splints without significant relief. She notes that she received injections in the 5th digit, but this was ultimately not successful. Her goal is to avoid surgery at all cost which bring her into to see me today.   Pain location/Descriptors: Tingling in 5th, 4th, 3rd, and 2nd on palmar side of the right hand.   Pt is right hand dominant. She notes more weakness on the right side (I.e: opening jars, fine motor)  Aggravating factors: Gripping, squeezing, fine motor activities, drawing, slouching forward, driving  Easing factors: rest, shaking the hand, sitting up right/positional changes at the spine  24 HR pattern: Worse in the morning  Imaging: MSK US ruled in CTS in 2022.   Special Questions: + for numbness & tingling   Patient goals:  Return to  normal lifestyle without N&T or pain  Hobbies/Interest: Exercise,         Patient Goals  Patient goals for therapy: decreased pain, increased strength, increased motion and independence with ADLs/IADLs    Pain  Pain scale: mild tingling.  Pain scale at highest: Numbness severe.      Diagnostic Tests  Ultrasound findings: abnormalTreatments  Previous treatment: injection treatment, medication and immobilization        Objective     Active Range of Motion   Cervical/Thoracic Spine     MSR:  Biceps: 2+  Triceps 2+  Brachioradialis: 2+  Prince's: absent     Cervical  Subcranial protraction:  WFL   Subcranial retraction: Active cervical subcranial retraction: Unable to reach neutral.   Flexion: 60 degrees   Extension: 60 degrees      Left lateral flexion: 40 degrees      Right lateral flexion: 40 degrees      Left rotation: 75 degrees  Right rotation: 80 degrees       Left Wrist   Wrist flexion: 80 degrees   Wrist extension: 65 degrees     Right Wrist   Wrist flexion: 80 degrees   Wrist extension: 65 degrees     Additional Active Range of Motion Details  Mild hand numbness present with left rotation and extension (this continues)     Strength/Myotome Testing   Cervical Spine     Left   Interossei strength (t1): 5    Right   Interossei strength (t1): 5    Left Shoulder     Planes of Motion   Flexion: 5   Abduction: 5   External rotation at 0°: 5     Right Shoulder     Planes of Motion   Flexion: 5   Abduction: 5   External rotation at 0°: 5     Left Elbow   Flexion: 5  Extension: 5    Right Elbow   Flexion: 5  Extension: 5    Left Wrist/Hand   Wrist extension: 4  Wrist flexion: 4  Thumb extension: 4     (2nd hand position)     Trial 1: 60    Right Wrist/Hand   Wrist extension: 4  Wrist flexion: 4  Thumb extension: 4     (2nd hand position)     Trial 1: 60    Tests   Cervical   Negative alar ligament test, Sharp-Xiomy test and VBI.     Left   Positive Spurling's Test A.   Negative Spurling's Test B.     Right  "  Positive Spurling's Test B.   Negative Spurling's Test A.     Left Shoulder   Positive ULTT1.     Right Shoulder   Positive ULTT1.     Left Elbow   Positive Tinel's sign (cubital tunnel).     Right Elbow   Positive Tinel's sign (cubital tunnel).     Left Wrist/Hand   Negative Tinel's sign (medial nerve) and Tinel's sign (radial tunnel).     Right Wrist/Hand   Positive Tinel's sign (medial nerve).     Additional Tests Details  Cervical lateral glides: Increased hand tingling - bilaterally (hypomobile)  Cervical rotational: Increased hand tingling- bilaterally     OA mobility: Poor- Improving  AA mobility: Poor - Improving  +Headaches     Traction: Negative   +Spurlings A/B  +ULTTA B   ULTT-A: positive:  L: 30 deg from elbow ext  R: 60 deg from elbow ext           Manuals 6/19 6/24  4/29 5/6 5/13 5/20 5/27 6/3 6/9    IASTM using LLL to carpal tunnel B 1.5 minutes B 1.5 minutes B  1.5 minutes B  1.5 minutes B  1.5 minutes B  1.5 minutes B 1.5 minutes B 1.5 minutes B 1.5 min B   STM to CT             Cervical lateral glides Gr II c/ Left sided UT STM  Gr II c/ Left sided UT STM   Gr II Gr II Gr II Gr II Gr II c/ Left sided UT STM  L UT STM only Gr II c/ Left sided UT STM    SOR with light traction  JK  JK  JK JK JK JK JK AFB JK   Neuro Re-Ed             Seated median N glides 20x3\" propped on table 20x3\" propped on table   20x3\" propped on table 20x3\" propped on table 20x3\" propped on table 20x3\" propped on table 20x3\" propped on table 20x3\" propped on table   Cervical retraction  15x3\"  15x3\"   15x3\"  15x3\" 15x3\" 15x3\" 15x3\" 15x3\"                                                                    Ther Ex             UBE 3'/3' L 3 3'/3' L 3  6' L1 8' L1 3'/3' L1 3'/3' L1 3'/3' L1 3'/3' L1 3'/3' L3   Wrist extension stretch              UT stretch 5x10\" B    10x5\"  10x10\" B 10x10\" B 10x10\" B 10x10\" B 10x10\" B   LS stretch  5x10\" B            Cervical snag-rotation  10x5\" B    10x5\" B  10x5\" B 10x5\" B 10x5\" B 10x5\" B " "10x5\" B   Sup/pro with flexbar 3x10 red    2x10 red flexbar 2x10 red flexar 3x10 red 3x10 red  3x10 red  3x10 red   Wrist extension stretch  10x10\" 10x10\"   10x10\" 10x10\"  10x10\" B 10x10\"  10x10\"  10x10\"                 Ther Activity                                       Gait Training                                       Modalities                                         1:1 with PT from 3-320pm                   "
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
## 2025-06-24 ENCOUNTER — EVALUATION (OUTPATIENT)
Dept: PHYSICAL THERAPY | Facility: CLINIC | Age: 45
End: 2025-06-24
Attending: PODIATRIST
Payer: COMMERCIAL

## 2025-06-24 ENCOUNTER — TELEPHONE (OUTPATIENT)
Age: 45
End: 2025-06-24

## 2025-06-24 DIAGNOSIS — R20.2 NUMBNESS AND TINGLING IN RIGHT HAND: ICD-10-CM

## 2025-06-24 DIAGNOSIS — G56.01 CARPAL TUNNEL SYNDROME OF RIGHT WRIST: ICD-10-CM

## 2025-06-24 DIAGNOSIS — R20.0 NUMBNESS AND TINGLING IN RIGHT HAND: ICD-10-CM

## 2025-06-24 DIAGNOSIS — M54.12 CERVICAL RADICULOPATHY: Primary | ICD-10-CM

## 2025-06-24 PROCEDURE — 97140 MANUAL THERAPY 1/> REGIONS: CPT | Performed by: PHYSICAL THERAPIST

## 2025-06-24 NOTE — TELEPHONE ENCOUNTER
Recieved call from Patient for New Patient - SOC - Moles one on chin (bleeding, painful), one on neck (grown bigger). Scheduled 9/16/25 10:30 am [facility] Mckeon. Verified insurance, provided [place] addr.     Patient verbalized understanding.

## 2025-06-30 ENCOUNTER — OFFICE VISIT (OUTPATIENT)
Dept: PHYSICAL THERAPY | Facility: CLINIC | Age: 45
End: 2025-06-30
Attending: PODIATRIST
Payer: COMMERCIAL

## 2025-06-30 DIAGNOSIS — G56.01 CARPAL TUNNEL SYNDROME OF RIGHT WRIST: ICD-10-CM

## 2025-06-30 DIAGNOSIS — R20.0 NUMBNESS AND TINGLING IN RIGHT HAND: ICD-10-CM

## 2025-06-30 DIAGNOSIS — R20.2 NUMBNESS AND TINGLING IN RIGHT HAND: ICD-10-CM

## 2025-06-30 DIAGNOSIS — M54.12 CERVICAL RADICULOPATHY: Primary | ICD-10-CM

## 2025-06-30 PROCEDURE — 97140 MANUAL THERAPY 1/> REGIONS: CPT | Performed by: PHYSICAL THERAPIST

## 2025-06-30 NOTE — PROGRESS NOTES
"Daily Note     Today's date: 2025  Patient name: Sonja Parrish  : 1980  MRN: [id number]  Referring provider: Tyler Astorga*  Dx:   Encounter Diagnosis     ICD-10-CM    1. Cervical radiculopathy  M54.12       2. Numbness and tingling in right hand  R20.0     R20.2       3. Carpal tunnel syndrome of right wrist  G56.01                      Subjective: Pt offers no new complaints today for neck/hand. Pt does report having dizziness today and associates it with her menstruation cycle.       Objective: See treatment diary below      Assessment: Tolerated treatment well. Patient exhibited good technique with therapeutic exercises.  Discussed with patient that dizziness can be vertigo or even cervicogenic dizziness. This time of year is common for increased dizziness secondary to allergies.       Plan: Continue per plan of care.        Manuals 6/19 6/24 6/30    5/20 5/27 6/3 6/9    IASTM using LLL to carpal tunnel B 1.5 minutes B 1.5 minutes B 1.5 minutes B    1.5 minutes B 1.5 minutes B 1.5 minutes B 1.5 min B   STM to CT             Cervical lateral glides Gr II c/ Left sided UT STM  Gr II c/ Left sided UT STM  Gr II c/ Left sided UT STM    Gr II Gr II c/ Left sided UT STM  L UT STM only Gr II c/ Left sided UT STM    SOR with light traction  JK  JK JK    JK JK AFB JK   Neuro Re-Ed             Seated median N glides 20x3\" propped on table 20x3\" propped on table 20x3\" propped on table    20x3\" propped on table 20x3\" propped on table 20x3\" propped on table 20x3\" propped on table   Cervical retraction  15x3\"  15x3\" 15x3\"    15x3\" 15x3\" 15x3\" 15x3\"                                                                    Ther Ex             UBE 3'/3' L 3 3'/3' L 3 3'/3' L 3    3'/3' L1 3'/3' L1 3'/3' L1 3'/3' L3   Wrist extension stretch              UT stretch 5x10\" B  5x10\" B    10x10\" B 10x10\" B 10x10\" B 10x10\" B   LS stretch  5x10\" B  5x10\" B          Cervical snag-rotation  10x5\" B  10x5\" B    10x5\" B 10x5\" " "B 10x5\" B 10x5\" B   Sup/pro with flexbar 3x10 red  3x10 green     3x10 red 3x10 red  3x10 red  3x10 red   Wrist extension stretch  10x10\" 10x10\" 10x10\"    10x10\" B 10x10\"  10x10\"  10x10\"                 Ther Activity                                       Gait Training                                       Modalities                                         1:1 with PT from [phone]                      "

## 2025-07-29 ENCOUNTER — APPOINTMENT (OUTPATIENT)
Dept: PHYSICAL THERAPY | Facility: CLINIC | Age: 45
End: 2025-07-29
Attending: PODIATRIST
Payer: COMMERCIAL

## 2025-07-31 ENCOUNTER — OFFICE VISIT (OUTPATIENT)
Dept: PHYSICAL THERAPY | Facility: CLINIC | Age: 45
End: 2025-07-31
Attending: PODIATRIST
Payer: COMMERCIAL

## 2025-07-31 DIAGNOSIS — M54.2 NECK PAIN: Primary | ICD-10-CM

## 2025-07-31 DIAGNOSIS — R20.0 HAND NUMBNESS: ICD-10-CM

## 2025-07-31 PROCEDURE — 97140 MANUAL THERAPY 1/> REGIONS: CPT | Performed by: PHYSICAL THERAPIST

## 2025-08-04 ENCOUNTER — OFFICE VISIT (OUTPATIENT)
Dept: PHYSICAL THERAPY | Facility: CLINIC | Age: 45
End: 2025-08-04
Attending: PODIATRIST

## 2025-08-04 DIAGNOSIS — R20.0 HAND NUMBNESS: ICD-10-CM

## 2025-08-04 DIAGNOSIS — M54.2 NECK PAIN: Primary | ICD-10-CM

## 2025-08-04 PROCEDURE — 97140 MANUAL THERAPY 1/> REGIONS: CPT | Performed by: PHYSICAL THERAPIST

## (undated) DEVICE — PLUMEPEN PRO 10FT

## (undated) DEVICE — SHOE, POST-OPERATIVE: Brand: DEROYAL

## (undated) DEVICE — ACE WRAP 6 IN UNSTERILE

## (undated) DEVICE — SUT ETHILON 4-0 PS-2 18 IN 1667H

## (undated) DEVICE — SUT VICRYL 4-0 SH 27 IN J415H

## (undated) DEVICE — GROUNDING PAD UNIVERSAL SLW

## (undated) DEVICE — SUT VICRYL 3-0 SH 27 IN J416H

## (undated) DEVICE — ACE WRAP 4 IN UNSTERILE

## (undated) DEVICE — STRETCH BANDAGE: Brand: CURITY

## (undated) DEVICE — U-DRAPE: Brand: CONVERTORS

## (undated) DEVICE — GLOVE INDICATOR PI UNDERGLOVE SZ 7.5 BLUE

## (undated) DEVICE — 10FR FRAZIER SUCTION HANDLE: Brand: CARDINAL HEALTH

## (undated) DEVICE — GAUZE SPONGES,16 PLY: Brand: CURITY

## (undated) DEVICE — PAD CAST 6 IN COTTON NON STERILE

## (undated) DEVICE — DRILL: Brand: SONICFUSION

## (undated) DEVICE — GLOVE SRG BIOGEL 7.5

## (undated) DEVICE — TUBING SUCTION 5MM X 12 FT

## (undated) DEVICE — PADDING CAST 4 IN  COTTON STRL

## (undated) DEVICE — INTENDED FOR TISSUE SEPARATION, AND OTHER PROCEDURES THAT REQUIRE A SHARP SURGICAL BLADE TO PUNCTURE OR CUT.: Brand: BARD-PARKER ® CARBON RIB-BACK BLADES

## (undated) DEVICE — NEEDLE 25G X 1 1/2

## (undated) DEVICE — PRECISION OFFSET (9.0 X 0.64 X 25.0MM)

## (undated) DEVICE — LIGHT HANDLE COVER SLEEVE DISP BLUE STELLAR

## (undated) DEVICE — ABDOMINAL PAD: Brand: DERMACEA

## (undated) DEVICE — SUT VICRYL 2-0 SH 27 IN UNDYED J417H

## (undated) DEVICE — CHLORAPREP HI-LITE 26ML ORANGE

## (undated) DEVICE — C-ARM: Brand: UNBRANDED

## (undated) DEVICE — OCCLUSIVE GAUZE STRIP,3% BISMUTH TRIBROMOPHENATE IN PETROLATUM BLEND: Brand: XEROFORM

## (undated) DEVICE — DRAPE C-ARMOUR

## (undated) DEVICE — STERILE BETHLEHEM PLASTIC HAND: Brand: CARDINAL HEALTH